# Patient Record
Sex: FEMALE | Race: BLACK OR AFRICAN AMERICAN | NOT HISPANIC OR LATINO | Employment: OTHER | URBAN - METROPOLITAN AREA
[De-identification: names, ages, dates, MRNs, and addresses within clinical notes are randomized per-mention and may not be internally consistent; named-entity substitution may affect disease eponyms.]

---

## 2017-03-08 ENCOUNTER — HISTORICAL (OUTPATIENT)
Dept: INTERNAL MEDICINE | Facility: CLINIC | Age: 57
End: 2017-03-08

## 2017-06-14 ENCOUNTER — HISTORICAL (OUTPATIENT)
Dept: ADMINISTRATIVE | Facility: HOSPITAL | Age: 57
End: 2017-06-14

## 2017-06-28 ENCOUNTER — HISTORICAL (OUTPATIENT)
Dept: ADMINISTRATIVE | Facility: HOSPITAL | Age: 57
End: 2017-06-28

## 2017-06-28 LAB
EST. AVERAGE GLUCOSE BLD GHB EST-MCNC: 286 MG/DL
HBA1C MFR BLD: 11.6 % (ref 4.2–6.3)

## 2017-10-21 ENCOUNTER — HISTORICAL (OUTPATIENT)
Dept: ADMINISTRATIVE | Facility: HOSPITAL | Age: 57
End: 2017-10-21

## 2017-10-21 LAB
APPEARANCE, UA: CLEAR
BACTERIA #/AREA URNS AUTO: ABNORMAL /[HPF]
BILIRUB UR QL STRIP: ABNORMAL MG/DL
COLOR UR: YELLOW
GLUCOSE (UA): >1000 MG/DL
HGB UR QL STRIP: ABNORMAL MG/DL
HYALINE CASTS #/AREA URNS LPF: ABNORMAL /[LPF]
KETONES UR QL STRIP: ABNORMAL MG/DL
LEUKOCYTE ESTERASE UR QL STRIP: 75 LEU/UL
NITRITE UR QL STRIP: ABNORMAL
PH UR STRIP: 7 [PH] (ref 4.5–8)
PROT UR QL STRIP: 20 MG/DL
RBC #/AREA URNS AUTO: ABNORMAL /[HPF]
SP GR UR STRIP: 1.01 (ref 1–1.03)
SQUAMOUS #/AREA URNS LPF: ABNORMAL /[LPF]
UROBILINOGEN UR STRIP-ACNC: NORMAL MG/DL
WBC #/AREA URNS AUTO: ABNORMAL /HPF

## 2018-03-26 ENCOUNTER — HISTORICAL (OUTPATIENT)
Dept: INTERNAL MEDICINE | Facility: CLINIC | Age: 58
End: 2018-03-26

## 2018-03-26 LAB
CHOLEST SERPL-MCNC: 230 MG/DL
CHOLEST/HDLC SERPL: 3.5 {RATIO} (ref 0–4.4)
CREAT UR-MCNC: 211 MG/DL
EST. AVERAGE GLUCOSE BLD GHB EST-MCNC: 183 MG/DL
HBA1C MFR BLD: 8 % (ref 4.2–6.3)
HDLC SERPL-MCNC: 65 MG/DL
LDLC SERPL CALC-MCNC: 138 MG/DL (ref 0–130)
MICROALBUMIN UR-MCNC: 149 MG/L (ref 0–19)
MICROALBUMIN/CREAT RATIO PNL UR: 70.6 MCG/MG CR (ref 0–29)
TRIGL SERPL-MCNC: 133 MG/DL
VLDLC SERPL CALC-MCNC: 27 MG/DL

## 2018-08-06 ENCOUNTER — HISTORICAL (OUTPATIENT)
Dept: INTERNAL MEDICINE | Facility: CLINIC | Age: 58
End: 2018-08-06

## 2018-08-06 LAB
CREAT UR-MCNC: 228 MG/DL
MICROALBUMIN UR-MCNC: 130 MG/L (ref 0–19)
MICROALBUMIN/CREAT RATIO PNL UR: 57 MCG/MG CR (ref 0–29)
T4 FREE SERPL-MCNC: 0.96 NG/DL (ref 0.76–1.46)
TSH SERPL-ACNC: 2.13 MIU/L (ref 0.36–3.74)

## 2018-10-25 ENCOUNTER — HISTORICAL (OUTPATIENT)
Dept: INTERNAL MEDICINE | Facility: CLINIC | Age: 58
End: 2018-10-25

## 2018-10-25 LAB
EST. AVERAGE GLUCOSE BLD GHB EST-MCNC: 177 MG/DL
HBA1C MFR BLD: 7.8 % (ref 4.2–6.3)

## 2019-02-04 ENCOUNTER — HISTORICAL (OUTPATIENT)
Dept: RADIOLOGY | Facility: HOSPITAL | Age: 59
End: 2019-02-04

## 2019-02-26 ENCOUNTER — HISTORICAL (OUTPATIENT)
Dept: RADIOLOGY | Facility: HOSPITAL | Age: 59
End: 2019-02-26

## 2019-03-01 ENCOUNTER — HISTORICAL (OUTPATIENT)
Dept: RADIOLOGY | Facility: HOSPITAL | Age: 59
End: 2019-03-01

## 2019-04-03 ENCOUNTER — HISTORICAL (OUTPATIENT)
Dept: SURGERY | Facility: HOSPITAL | Age: 59
End: 2019-04-03

## 2019-05-01 ENCOUNTER — HISTORICAL (OUTPATIENT)
Dept: ADMINISTRATIVE | Facility: HOSPITAL | Age: 59
End: 2019-05-01

## 2019-05-01 LAB
ABS NEUT (OLG): 4.24 X10(3)/MCL (ref 2.1–9.2)
ALBUMIN SERPL-MCNC: 3.9 GM/DL (ref 3.4–5)
ALBUMIN/GLOB SERPL: 0.8 RATIO (ref 1.1–2)
ALP SERPL-CCNC: 83 UNIT/L (ref 45–117)
ALT SERPL-CCNC: 19 UNIT/L (ref 12–78)
AST SERPL-CCNC: 13 UNIT/L (ref 15–37)
BASOPHILS # BLD AUTO: 0.02 X10(3)/MCL
BASOPHILS NFR BLD AUTO: 0 %
BILIRUB SERPL-MCNC: 0.5 MG/DL (ref 0.2–1)
BILIRUBIN DIRECT+TOT PNL SERPL-MCNC: <0.1 MG/DL
BILIRUBIN DIRECT+TOT PNL SERPL-MCNC: ABNORMAL MG/DL
BUN SERPL-MCNC: 13 MG/DL (ref 7–18)
CALCIUM SERPL-MCNC: 10 MG/DL (ref 8.5–10.1)
CHLORIDE SERPL-SCNC: 104 MMOL/L (ref 98–107)
CO2 SERPL-SCNC: 27 MMOL/L (ref 21–32)
CREAT SERPL-MCNC: 0.6 MG/DL (ref 0.6–1.3)
EOSINOPHIL # BLD AUTO: 0.1 X10(3)/MCL
EOSINOPHIL NFR BLD AUTO: 1 %
ERYTHROCYTE [DISTWIDTH] IN BLOOD BY AUTOMATED COUNT: 13.2 % (ref 11.5–14.5)
EST. AVERAGE GLUCOSE BLD GHB EST-MCNC: 186 MG/DL
GLOBULIN SER-MCNC: 4.7 GM/ML (ref 2.3–3.5)
GLUCOSE SERPL-MCNC: 104 MG/DL (ref 74–106)
HBA1C MFR BLD: 8.1 % (ref 4.2–6.3)
HCT VFR BLD AUTO: 36.6 % (ref 35–46)
HGB BLD-MCNC: 11.6 GM/DL (ref 12–16)
IMM GRANULOCYTES # BLD AUTO: 0.03 10*3/UL
IMM GRANULOCYTES NFR BLD AUTO: 0 %
LYMPHOCYTES # BLD AUTO: 2.74 X10(3)/MCL
LYMPHOCYTES NFR BLD AUTO: 35 % (ref 13–40)
MCH RBC QN AUTO: 27 PG (ref 26–34)
MCHC RBC AUTO-ENTMCNC: 31.7 GM/DL (ref 31–37)
MCV RBC AUTO: 85.3 FL (ref 80–100)
MONOCYTES # BLD AUTO: 0.8 X10(3)/MCL
MONOCYTES NFR BLD AUTO: 10 % (ref 4–12)
NEUTROPHILS # BLD AUTO: 4.24 X10(3)/MCL
NEUTROPHILS NFR BLD AUTO: 53 X10(3)/MCL
PLATELET # BLD AUTO: 313 X10(3)/MCL (ref 130–400)
PMV BLD AUTO: 10.1 FL (ref 7.4–10.4)
POTASSIUM SERPL-SCNC: 3.7 MMOL/L (ref 3.5–5.1)
PROT SERPL-MCNC: 8.6 GM/DL (ref 6.4–8.2)
RBC # BLD AUTO: 4.29 X10(6)/MCL (ref 4–5.2)
SODIUM SERPL-SCNC: 138 MMOL/L (ref 136–145)
T4 FREE SERPL-MCNC: 0.96 NG/DL (ref 0.76–1.46)
TSH SERPL-ACNC: 2.13 MIU/L (ref 0.36–3.74)
WBC # SPEC AUTO: 7.9 X10(3)/MCL (ref 4.5–11)

## 2019-05-06 ENCOUNTER — HISTORICAL (OUTPATIENT)
Dept: INTERNAL MEDICINE | Facility: CLINIC | Age: 59
End: 2019-05-06

## 2019-05-06 LAB
CHOLEST SERPL-MCNC: 207 MG/DL
CHOLEST/HDLC SERPL: 3.1 {RATIO} (ref 0–4.4)
HDLC SERPL-MCNC: 66 MG/DL
LDLC SERPL CALC-MCNC: 120 MG/DL (ref 0–130)
TRIGL SERPL-MCNC: 106 MG/DL
VLDLC SERPL CALC-MCNC: 21 MG/DL

## 2019-05-08 ENCOUNTER — HISTORICAL (OUTPATIENT)
Dept: SURGERY | Facility: HOSPITAL | Age: 59
End: 2019-05-08

## 2019-05-08 LAB — POTASSIUM SERPL-SCNC: 4.1 MMOL/L (ref 3.5–5.1)

## 2019-05-13 ENCOUNTER — HISTORICAL (OUTPATIENT)
Dept: RADIOLOGY | Facility: HOSPITAL | Age: 59
End: 2019-05-13

## 2019-12-04 ENCOUNTER — HISTORICAL (OUTPATIENT)
Dept: ADMINISTRATIVE | Facility: HOSPITAL | Age: 59
End: 2019-12-04

## 2019-12-04 LAB
APPEARANCE, UA: CLEAR
BACTERIA #/AREA URNS AUTO: ABNORMAL /HPF
BILIRUB UR QL STRIP: NEGATIVE
COLOR UR: ABNORMAL
GLUCOSE (UA): NEGATIVE
HGB UR QL STRIP: 0.1
HYALINE CASTS #/AREA URNS LPF: ABNORMAL /LPF
KETONES UR QL STRIP: NEGATIVE
LEUKOCYTE ESTERASE UR QL STRIP: 500 LEU/UL
NITRITE UR QL STRIP: NEGATIVE
PH UR STRIP: 5.5 [PH] (ref 4.5–8)
PROT UR QL STRIP: 10 MG/DL
RBC #/AREA URNS AUTO: ABNORMAL /HPF
SP GR UR STRIP: 1.01 (ref 1–1.03)
SQUAMOUS #/AREA URNS LPF: ABNORMAL /LPF
UROBILINOGEN UR STRIP-ACNC: NORMAL
WBC #/AREA URNS AUTO: ABNORMAL /HPF

## 2020-02-05 ENCOUNTER — HISTORICAL (OUTPATIENT)
Dept: RADIOLOGY | Facility: HOSPITAL | Age: 60
End: 2020-02-05

## 2020-02-11 LAB
BILIRUB SERPL-MCNC: NEGATIVE MG/DL
BLOOD URINE, POC: NORMAL
CLARITY, POC UA: CLEAR
COLOR, POC UA: YELLOW
GLUCOSE UR QL STRIP: NEGATIVE
KETONES UR QL STRIP: NORMAL
LEUKOCYTE EST, POC UA: NORMAL
NITRITE, POC UA: NEGATIVE
PH, POC UA: 5.5
PROTEIN, POC: NORMAL
SPECIFIC GRAVITY, POC UA: 1.01
UROBILINOGEN, POC UA: NORMAL

## 2020-04-27 ENCOUNTER — HISTORICAL (OUTPATIENT)
Dept: ADMINISTRATIVE | Facility: HOSPITAL | Age: 60
End: 2020-04-27

## 2020-04-27 LAB
APPEARANCE, UA: CLEAR
BACTERIA #/AREA URNS AUTO: ABNORMAL /HPF
BILIRUB SERPL-MCNC: NEGATIVE MG/DL
BILIRUB UR QL STRIP: NEGATIVE
BLOOD URINE, POC: NEGATIVE
CLARITY, POC UA: CLEAR
COLOR UR: YELLOW
COLOR, POC UA: YELLOW
GLUCOSE (UA): 150 MG/DL
GLUCOSE UR QL STRIP: NORMAL
HGB UR QL STRIP: NEGATIVE
HYALINE CASTS #/AREA URNS LPF: ABNORMAL /LPF
KETONES UR QL STRIP: ABNORMAL
KETONES UR QL STRIP: NEGATIVE
LEUKOCYTE EST, POC UA: NEGATIVE
LEUKOCYTE ESTERASE UR QL STRIP: NEGATIVE
NITRITE UR QL STRIP: NEGATIVE
NITRITE, POC UA: NEGATIVE
PH UR STRIP: 5.5 [PH] (ref 4.5–8)
PH, POC UA: 5
PROT UR QL STRIP: 30 MG/DL
PROTEIN, POC: NORMAL
RBC #/AREA URNS AUTO: ABNORMAL /HPF
SP GR UR STRIP: 1.02 (ref 1–1.03)
SPECIFIC GRAVITY, POC UA: 1.02
SQUAMOUS #/AREA URNS LPF: ABNORMAL /LPF
UROBILINOGEN UR STRIP-ACNC: NORMAL
UROBILINOGEN, POC UA: NORMAL
WBC #/AREA URNS AUTO: ABNORMAL /HPF

## 2020-04-29 LAB — FINAL CULTURE: NORMAL

## 2020-06-12 ENCOUNTER — HISTORICAL (OUTPATIENT)
Dept: INTERNAL MEDICINE | Facility: CLINIC | Age: 60
End: 2020-06-12

## 2020-06-12 LAB
ABS NEUT (OLG): 3.2 X10(3)/MCL (ref 2.1–9.2)
ALBUMIN SERPL-MCNC: 4 GM/DL (ref 3.4–5)
ALBUMIN/GLOB SERPL: 0.9 RATIO (ref 1.1–2)
ALP SERPL-CCNC: 72 UNIT/L (ref 45–117)
ALT SERPL-CCNC: 17 UNIT/L (ref 12–78)
AST SERPL-CCNC: 9 UNIT/L (ref 15–37)
BASOPHILS # BLD AUTO: 0 X10(3)/MCL (ref 0–0.2)
BASOPHILS NFR BLD AUTO: 0 %
BILIRUB SERPL-MCNC: 0.3 MG/DL (ref 0.2–1)
BILIRUBIN DIRECT+TOT PNL SERPL-MCNC: 0.1 MG/DL (ref 0–0.2)
BILIRUBIN DIRECT+TOT PNL SERPL-MCNC: 0.2 MG/DL
BUN SERPL-MCNC: 20 MG/DL (ref 7–18)
CALCIUM SERPL-MCNC: 9.8 MG/DL (ref 8.5–10.1)
CHLORIDE SERPL-SCNC: 102 MMOL/L (ref 98–107)
CHOLEST SERPL-MCNC: 207 MG/DL
CHOLEST/HDLC SERPL: 3.2 {RATIO} (ref 0–4.4)
CO2 SERPL-SCNC: 27 MMOL/L (ref 21–32)
CREAT SERPL-MCNC: 0.7 MG/DL (ref 0.6–1.3)
DEPRECATED CALCIDIOL+CALCIFEROL SERPL-MC: 9.6 NG/ML (ref 30–80)
EOSINOPHIL # BLD AUTO: 0.1 X10(3)/MCL (ref 0–0.9)
EOSINOPHIL NFR BLD AUTO: 1 %
ERYTHROCYTE [DISTWIDTH] IN BLOOD BY AUTOMATED COUNT: 13.1 % (ref 11.5–14.5)
EST. AVERAGE GLUCOSE BLD GHB EST-MCNC: 206 MG/DL
GLOBULIN SER-MCNC: 4.7 GM/ML (ref 2.3–3.5)
GLUCOSE SERPL-MCNC: 231 MG/DL (ref 74–106)
HBA1C MFR BLD: 8.8 % (ref 4.2–6.3)
HCT VFR BLD AUTO: 37 % (ref 35–46)
HDLC SERPL-MCNC: 64 MG/DL (ref 40–59)
HGB BLD-MCNC: 11.8 GM/DL (ref 12–16)
IMM GRANULOCYTES # BLD AUTO: 0.02 10*3/UL
IMM GRANULOCYTES NFR BLD AUTO: 0 %
LDLC SERPL CALC-MCNC: 112 MG/DL
LYMPHOCYTES # BLD AUTO: 2.8 X10(3)/MCL (ref 0.6–4.6)
LYMPHOCYTES NFR BLD AUTO: 40 %
MCH RBC QN AUTO: 26.9 PG (ref 26–34)
MCHC RBC AUTO-ENTMCNC: 31.9 GM/DL (ref 31–37)
MCV RBC AUTO: 84.5 FL (ref 80–100)
MONOCYTES # BLD AUTO: 0.8 X10(3)/MCL (ref 0.1–1.3)
MONOCYTES NFR BLD AUTO: 11 %
NEUTROPHILS # BLD AUTO: 3.2 X10(3)/MCL (ref 2.1–9.2)
NEUTROPHILS NFR BLD AUTO: 46 %
PLATELET # BLD AUTO: 329 X10(3)/MCL (ref 130–400)
PMV BLD AUTO: 9.8 FL (ref 7.4–10.4)
POTASSIUM SERPL-SCNC: 4 MMOL/L (ref 3.5–5.1)
PROT SERPL-MCNC: 8.7 GM/DL (ref 6.4–8.2)
RBC # BLD AUTO: 4.38 X10(6)/MCL (ref 4–5.2)
SODIUM SERPL-SCNC: 137 MMOL/L (ref 136–145)
TRIGL SERPL-MCNC: 155 MG/DL
TSH SERPL-ACNC: 2.37 MIU/L (ref 0.36–3.74)
VIT B12 SERPL-MCNC: 582 PG/ML (ref 193–986)
VLDLC SERPL CALC-MCNC: 31 MG/DL
WBC # SPEC AUTO: 6.9 X10(3)/MCL (ref 4.5–11)

## 2020-08-31 ENCOUNTER — HISTORICAL (OUTPATIENT)
Dept: LAB | Facility: HOSPITAL | Age: 60
End: 2020-08-31

## 2020-08-31 LAB — DEPRECATED CALCIDIOL+CALCIFEROL SERPL-MC: 22.5 NG/ML (ref 30–80)

## 2020-11-25 ENCOUNTER — HISTORICAL (OUTPATIENT)
Dept: ADMINISTRATIVE | Facility: HOSPITAL | Age: 60
End: 2020-11-25

## 2020-12-01 ENCOUNTER — HISTORICAL (OUTPATIENT)
Dept: SURGERY | Facility: HOSPITAL | Age: 60
End: 2020-12-01

## 2020-12-24 ENCOUNTER — HISTORICAL (OUTPATIENT)
Dept: INTERNAL MEDICINE | Facility: CLINIC | Age: 60
End: 2020-12-24

## 2020-12-24 LAB
ABS NEUT (OLG): 2.98 X10(3)/MCL (ref 2.1–9.2)
ALBUMIN SERPL-MCNC: 4 GM/DL (ref 3.4–4.8)
ALBUMIN/GLOB SERPL: 1 RATIO (ref 1.1–2)
ALP SERPL-CCNC: 66 UNIT/L (ref 40–150)
ALT SERPL-CCNC: 10 UNIT/L (ref 0–55)
AST SERPL-CCNC: 11 UNIT/L (ref 5–34)
BASOPHILS # BLD AUTO: 0 X10(3)/MCL (ref 0–0.2)
BASOPHILS NFR BLD AUTO: 1 %
BILIRUB SERPL-MCNC: 0.3 MG/DL
BILIRUBIN DIRECT+TOT PNL SERPL-MCNC: 0.1 MG/DL (ref 0–0.8)
BILIRUBIN DIRECT+TOT PNL SERPL-MCNC: 0.2 MG/DL (ref 0–0.5)
BUN SERPL-MCNC: 18 MG/DL (ref 9.8–20.1)
CALCIUM SERPL-MCNC: 10.1 MG/DL (ref 8.4–10.2)
CHLORIDE SERPL-SCNC: 103 MMOL/L (ref 98–107)
CHOLEST SERPL-MCNC: 195 MG/DL
CHOLEST/HDLC SERPL: 3 {RATIO} (ref 0–5)
CO2 SERPL-SCNC: 26 MMOL/L (ref 23–31)
CREAT SERPL-MCNC: 0.67 MG/DL (ref 0.55–1.02)
CREAT UR-MCNC: 109.4 MG/DL (ref 45–106)
CREAT UR-MCNC: 109.4 MG/DL (ref 45–106)
DEPRECATED CALCIDIOL+CALCIFEROL SERPL-MC: 22.1 NG/ML (ref 30–80)
EOSINOPHIL # BLD AUTO: 0.1 X10(3)/MCL (ref 0–0.9)
EOSINOPHIL NFR BLD AUTO: 2 %
ERYTHROCYTE [DISTWIDTH] IN BLOOD BY AUTOMATED COUNT: 12.9 % (ref 11.5–14.5)
EST. AVERAGE GLUCOSE BLD GHB EST-MCNC: 182.9 MG/DL
GLOBULIN SER-MCNC: 4 GM/DL (ref 2.4–3.5)
GLUCOSE SERPL-MCNC: 112 MG/DL (ref 82–115)
HBA1C MFR BLD: 8 %
HCT VFR BLD AUTO: 33.5 % (ref 35–46)
HDLC SERPL-MCNC: 60 MG/DL (ref 35–60)
HGB BLD-MCNC: 10.6 GM/DL (ref 12–16)
IMM GRANULOCYTES # BLD AUTO: 0.02 10*3/UL
IMM GRANULOCYTES NFR BLD AUTO: 0 %
LDLC SERPL CALC-MCNC: 118 MG/DL (ref 50–140)
LYMPHOCYTES # BLD AUTO: 2.2 X10(3)/MCL (ref 0.6–4.6)
LYMPHOCYTES NFR BLD AUTO: 35 %
MCH RBC QN AUTO: 27 PG (ref 26–34)
MCHC RBC AUTO-ENTMCNC: 31.6 GM/DL (ref 31–37)
MCV RBC AUTO: 85.2 FL (ref 80–100)
MICROALBUMIN UR-MCNC: 148.1 UG/ML
MICROALBUMIN/CREAT RATIO PNL UR: 135.4 MG/GM CR (ref 0–30)
MONOCYTES # BLD AUTO: 0.9 X10(3)/MCL (ref 0.1–1.3)
MONOCYTES NFR BLD AUTO: 14 %
NEUTROPHILS # BLD AUTO: 2.98 X10(3)/MCL (ref 2.1–9.2)
NEUTROPHILS NFR BLD AUTO: 47 %
PLATELET # BLD AUTO: 295 X10(3)/MCL (ref 130–400)
PMV BLD AUTO: 9.8 FL (ref 7.4–10.4)
POTASSIUM SERPL-SCNC: 3.8 MMOL/L (ref 3.5–5.1)
PROT SERPL-MCNC: 8 GM/DL (ref 5.8–7.6)
PROT UR STRIP-MCNC: 35.8 MG/DL
PROT/CREAT UR-RTO: 327.2 MG/GM
RBC # BLD AUTO: 3.93 X10(6)/MCL (ref 4–5.2)
SODIUM SERPL-SCNC: 140 MMOL/L (ref 136–145)
T4 SERPL-MCNC: 7.13 UG/DL (ref 4.87–11.72)
TRIGL SERPL-MCNC: 86 MG/DL (ref 37–140)
TSH SERPL-ACNC: 3.22 UIU/ML (ref 0.35–4.94)
VIT B12 SERPL-MCNC: 672 PG/ML (ref 213–816)
VLDLC SERPL CALC-MCNC: 17 MG/DL
WBC # SPEC AUTO: 6.3 X10(3)/MCL (ref 4.5–11)

## 2021-03-05 LAB
BILIRUB SERPL-MCNC: NEGATIVE MG/DL
BLOOD URINE, POC: NEGATIVE
GLUCOSE UR QL STRIP: NORMAL
KETONES UR QL STRIP: NEGATIVE
LEUKOCYTE EST, POC UA: NORMAL
NITRITE, POC UA: NEGATIVE
PH, POC UA: 7
PROTEIN, POC: NEGATIVE
SPECIFIC GRAVITY, POC UA: 1.02
UROBILINOGEN, POC UA: NORMAL

## 2021-04-12 ENCOUNTER — HISTORICAL (OUTPATIENT)
Dept: ADMINISTRATIVE | Facility: HOSPITAL | Age: 61
End: 2021-04-12

## 2021-04-12 LAB
DEPRECATED CALCIDIOL+CALCIFEROL SERPL-MC: 34.5 NG/ML (ref 30–80)
EST. AVERAGE GLUCOSE BLD GHB EST-MCNC: 180 MG/DL
HBA1C MFR BLD: 7.9 %
T3FREE SERPL-MCNC: 3.54 PG/ML (ref 1.71–3.71)
T4 FREE SERPL-MCNC: 0.79 NG/DL (ref 0.7–1.48)
TSH SERPL-ACNC: 1.9 UIU/ML (ref 0.35–4.94)

## 2021-10-05 ENCOUNTER — HISTORICAL (OUTPATIENT)
Dept: ADMINISTRATIVE | Facility: HOSPITAL | Age: 61
End: 2021-10-05

## 2021-10-05 LAB
ABS NEUT (OLG): 6.24 X10(3)/MCL (ref 2.1–9.2)
ALBUMIN SERPL-MCNC: 4.3 GM/DL (ref 3.4–4.8)
ALBUMIN/GLOB SERPL: 1 RATIO (ref 1.1–2)
ALP SERPL-CCNC: 84 UNIT/L (ref 40–150)
ALT SERPL-CCNC: 15 UNIT/L (ref 0–55)
APPEARANCE, UA: CLEAR
AST SERPL-CCNC: 15 UNIT/L (ref 5–34)
BACTERIA #/AREA URNS AUTO: ABNORMAL /HPF
BASOPHILS # BLD AUTO: 0 X10(3)/MCL (ref 0–0.2)
BASOPHILS NFR BLD AUTO: 0 %
BILIRUB SERPL-MCNC: 0.4 MG/DL
BILIRUB UR QL STRIP: NEGATIVE
BILIRUBIN DIRECT+TOT PNL SERPL-MCNC: 0.2 MG/DL (ref 0–0.5)
BILIRUBIN DIRECT+TOT PNL SERPL-MCNC: 0.2 MG/DL (ref 0–0.8)
BUN SERPL-MCNC: 16.7 MG/DL (ref 9.8–20.1)
CALCIUM SERPL-MCNC: 11.7 MG/DL (ref 8.4–10.2)
CHLORIDE SERPL-SCNC: 101 MMOL/L (ref 98–107)
CO2 SERPL-SCNC: 26 MMOL/L (ref 23–31)
COLOR UR: YELLOW
CREAT SERPL-MCNC: 0.83 MG/DL (ref 0.55–1.02)
DEPRECATED CALCIDIOL+CALCIFEROL SERPL-MC: 38.8 NG/ML (ref 30–80)
EOSINOPHIL # BLD AUTO: 0.1 X10(3)/MCL (ref 0–0.9)
EOSINOPHIL NFR BLD AUTO: 1 %
ERYTHROCYTE [DISTWIDTH] IN BLOOD BY AUTOMATED COUNT: 13.2 % (ref 11.5–14.5)
EST. AVERAGE GLUCOSE BLD GHB EST-MCNC: 200.1 MG/DL
FERRITIN SERPL-MCNC: 74.86 NG/ML (ref 4.63–204)
GLOBULIN SER-MCNC: 4.4 GM/DL (ref 2.4–3.5)
GLUCOSE (UA): 30 MG/DL
GLUCOSE SERPL-MCNC: 147 MG/DL (ref 82–115)
HBA1C MFR BLD: 8.6 %
HCT VFR BLD AUTO: 37.2 % (ref 35–46)
HGB BLD-MCNC: 11.9 GM/DL (ref 12–16)
HGB UR QL STRIP: NEGATIVE
HYALINE CASTS #/AREA URNS LPF: ABNORMAL /LPF
IMM GRANULOCYTES # BLD AUTO: 0.03 10*3/UL
IMM GRANULOCYTES NFR BLD AUTO: 0 %
IRON SATN MFR SERPL: 13 % (ref 20–50)
IRON SERPL-MCNC: 42 UG/DL (ref 50–170)
KETONES UR QL STRIP: ABNORMAL
LEUKOCYTE ESTERASE UR QL STRIP: 250 LEU/UL
LYMPHOCYTES # BLD AUTO: 3.7 X10(3)/MCL (ref 0.6–4.6)
LYMPHOCYTES NFR BLD AUTO: 34 %
MCH RBC QN AUTO: 27.4 PG (ref 26–34)
MCHC RBC AUTO-ENTMCNC: 32 GM/DL (ref 31–37)
MCV RBC AUTO: 85.5 FL (ref 80–100)
MONOCYTES # BLD AUTO: 0.8 X10(3)/MCL (ref 0.1–1.3)
MONOCYTES NFR BLD AUTO: 8 %
NEUTROPHILS # BLD AUTO: 6.24 X10(3)/MCL (ref 2.1–9.2)
NEUTROPHILS NFR BLD AUTO: 57 %
NITRITE UR QL STRIP: NEGATIVE
NRBC BLD AUTO-RTO: 0 % (ref 0–0.2)
PH UR STRIP: 5.5 [PH] (ref 4.5–8)
PLATELET # BLD AUTO: 322 X10(3)/MCL (ref 130–400)
PMV BLD AUTO: 9.6 FL (ref 7.4–10.4)
POTASSIUM SERPL-SCNC: 4.2 MMOL/L (ref 3.5–5.1)
PROT SERPL-MCNC: 8.7 GM/DL (ref 5.8–7.6)
PROT UR QL STRIP: 50 MG/DL
RBC # BLD AUTO: 4.35 X10(6)/MCL (ref 4–5.2)
RBC #/AREA URNS AUTO: ABNORMAL /HPF
SODIUM SERPL-SCNC: 139 MMOL/L (ref 136–145)
SP GR UR STRIP: 1.02 (ref 1–1.03)
SQUAMOUS #/AREA URNS LPF: ABNORMAL /LPF
TIBC SERPL-MCNC: 276 UG/DL (ref 70–310)
TIBC SERPL-MCNC: 318 UG/DL (ref 250–450)
TRANSFERRIN SERPL-MCNC: 285 MG/DL (ref 173–360)
TSH SERPL-ACNC: 1.25 UIU/ML (ref 0.35–4.94)
UROBILINOGEN UR STRIP-ACNC: 2 MG/DL
WBC # SPEC AUTO: 10.9 X10(3)/MCL (ref 4.5–11)
WBC #/AREA URNS AUTO: ABNORMAL /HPF

## 2021-10-07 LAB — FINAL CULTURE: NORMAL

## 2022-01-27 ENCOUNTER — HISTORICAL (OUTPATIENT)
Dept: LAB | Facility: HOSPITAL | Age: 62
End: 2022-01-27

## 2022-01-27 LAB
ABS NEUT (OLG): 3.84 (ref 2.1–9.2)
ALBUMIN SERPL-MCNC: 4.2 G/DL (ref 3.4–4.8)
ALBUMIN/GLOB SERPL: 1 {RATIO} (ref 1.1–2)
ALP SERPL-CCNC: 63 U/L (ref 40–150)
ALT SERPL-CCNC: 13 U/L (ref 0–55)
AST SERPL-CCNC: 13 U/L (ref 5–34)
BASOPHILS # BLD AUTO: 0 10*3/UL (ref 0–0.2)
BASOPHILS NFR BLD AUTO: 0 %
BILIRUB SERPL-MCNC: 0.3 MG/DL
BILIRUBIN DIRECT+TOT PNL SERPL-MCNC: 0.1 (ref 0–0.5)
BILIRUBIN DIRECT+TOT PNL SERPL-MCNC: 0.2 (ref 0–0.8)
BUN SERPL-MCNC: 18.8 MG/DL (ref 9.8–20.1)
CALCIUM SERPL-MCNC: 10.8 MG/DL (ref 8.7–10.5)
CHLORIDE SERPL-SCNC: 100 MMOL/L (ref 98–107)
CO2 SERPL-SCNC: 27 MMOL/L (ref 23–31)
CREAT SERPL-MCNC: 0.91 MG/DL (ref 0.55–1.02)
CREAT UR-MCNC: 162.1 MG/DL (ref 45–106)
CREAT UR-MCNC: 162.1 MG/DL (ref 45–106)
DEPRECATED CALCIDIOL+CALCIFEROL SERPL-MC: 39 NG/ML (ref 30–80)
EOSINOPHIL # BLD AUTO: 0.1 10*3/UL (ref 0–0.9)
EOSINOPHIL NFR BLD AUTO: 1 %
ERYTHROCYTE [DISTWIDTH] IN BLOOD BY AUTOMATED COUNT: 13.2 % (ref 11.5–14.5)
EST. AVERAGE GLUCOSE BLD GHB EST-MCNC: 197.2 MG/DL
FERRITIN SERPL-MCNC: 69.78 NG/ML (ref 4.63–204)
FLAG2 (OHS): 60
FLAG3 (OHS): 80
FLAGS (OHS): 80
GLOBULIN SER-MCNC: 4.1 G/DL (ref 2.4–3.5)
GLUCOSE SERPL-MCNC: 231 MG/DL (ref 82–115)
HBA1C MFR BLD: 8.5 %
HCT VFR BLD AUTO: 35.1 % (ref 35–46)
HEMOLYSIS INTERF INDEX SERPL-ACNC: 1
HGB BLD-MCNC: 11.2 G/DL (ref 12–16)
ICTERIC INTERF INDEX SERPL-ACNC: 0
IMM GRANULOCYTES # BLD AUTO: 0.02 10*3/UL
IMM GRANULOCYTES NFR BLD AUTO: 0 %
IRON SATN MFR SERPL: 17 % (ref 20–50)
IRON SERPL-MCNC: 49 UG/DL (ref 50–170)
LIPEMIC INTERF INDEX SERPL-ACNC: 5
LOW EVENT # SUSPECT FLAG (OHS): 80
LYMPHOCYTES # BLD AUTO: 2.2 10*3/UL (ref 0.6–4.6)
LYMPHOCYTES NFR BLD AUTO: 32 %
MANUAL DIFF? (OHS): NO
MCH RBC QN AUTO: 27.1 PG (ref 26–34)
MCHC RBC AUTO-ENTMCNC: 31.9 G/DL (ref 31–37)
MCV RBC AUTO: 84.8 FL (ref 80–100)
MICROALBUMIN UR-MCNC: 68.2
MICROALBUMIN/CREAT RATIO PNL UR: 42.1 (ref 0–30)
MO BLASTS SUSPECT FLAG (OHS): 40
MONOCYTES # BLD AUTO: 0.6 10*3/UL (ref 0.1–1.3)
MONOCYTES NFR BLD AUTO: 9 %
NEUTROPHILS # BLD AUTO: 3.84 10*3/UL (ref 2.1–9.2)
NEUTROPHILS NFR BLD AUTO: 57 %
NRBC BLD AUTO-RTO: 0 % (ref 0–0.2)
PLATELET # BLD AUTO: 326 10*3/UL (ref 130–400)
PMV BLD AUTO: 9.8 FL (ref 7.4–10.4)
POTASSIUM SERPL-SCNC: 3.8 MMOL/L (ref 3.5–5.1)
PROT SERPL-MCNC: 8.3 G/DL (ref 5.8–7.6)
PROT UR STRIP-MCNC: 20.9 MG/DL
PROT/CREAT UR-RTO: 128.9
PTH-INTACT SERPL-MCNC: 83.8 PG/ML (ref 8.7–77)
RBC # BLD AUTO: 4.14 10*6/UL (ref 4–5.2)
SODIUM SERPL-SCNC: 137 MMOL/L (ref 136–145)
TIBC SERPL-MCNC: 235 UG/DL (ref 70–310)
TIBC SERPL-MCNC: 284 UG/DL (ref 250–450)
TRANSFERRIN SERPL-MCNC: 269 MG/DL (ref 173–360)
TSH SERPL-ACNC: 1.13 M[IU]/L (ref 0.35–4.94)
WBC # SPEC AUTO: 6.7 10*3/UL (ref 4.5–11)

## 2022-02-10 ENCOUNTER — HISTORICAL (OUTPATIENT)
Dept: ADMINISTRATIVE | Facility: HOSPITAL | Age: 62
End: 2022-02-10

## 2022-02-10 ENCOUNTER — HISTORICAL (OUTPATIENT)
Dept: RADIOLOGY | Facility: HOSPITAL | Age: 62
End: 2022-02-10

## 2022-04-10 ENCOUNTER — HISTORICAL (OUTPATIENT)
Dept: ADMINISTRATIVE | Facility: HOSPITAL | Age: 62
End: 2022-04-10

## 2022-04-25 ENCOUNTER — HISTORICAL (OUTPATIENT)
Dept: RADIOLOGY | Facility: HOSPITAL | Age: 62
End: 2022-04-25

## 2022-04-27 ENCOUNTER — HISTORICAL (OUTPATIENT)
Dept: RADIOLOGY | Facility: HOSPITAL | Age: 62
End: 2022-04-27

## 2022-04-28 VITALS
BODY MASS INDEX: 25.68 KG/M2 | SYSTOLIC BLOOD PRESSURE: 166 MMHG | HEIGHT: 65 IN | OXYGEN SATURATION: 98 % | DIASTOLIC BLOOD PRESSURE: 71 MMHG | WEIGHT: 154.13 LBS

## 2022-05-03 NOTE — HISTORICAL [facility] CERNER
This is a historical note converted from Mabel. Formatting and pictures may have been removed.  Please reference Mabel for original formatting and attached multimedia. Chief Complaint  follow up with medication refills, increase urination  History of Present Illness  58-year-old -American female with past medical history significant for diabetes mellitus type II, hyperthyroidism, hypertension, hyperlipidemia, peripheral neuropathy presents to medicine clinic today for routine follow-up.?  ?   Last visit- Patient is self pay so cannot do vaccinations, will be applying to indigent today. She wants to do all health maintenance test after she has gotten insurance. Only wants some labs done today since its too expensive. States that her sugars run from 90 - 140 in the morning. taking 35 units Levemir at bedtime. ?She stopped taking Januvia on her own. Ran out of BP meds, wants refills. Did nt take any meds this AM. Not complaint with diet. Only take PTU BID not TID as prescribed, will change Rx. Denies?chest pain, shortness of breath, palpitations, PND, orthopnea, fever, chills, nausea, vomiting, diarrhea, constipation, weight loss.  ?   TODAY - here for f/u. need refills. c/o increased urination with some itching. but no burning, pain, or foul odor. Not sexually active. No fevers, chills. She left her FIT at home, will bring it later. She refused all vaccination.?She noticed her CBG?to be higher on Levemir?30 units qPM, increased it back to 34 units qPM. CBG in 110-120 now. Reports com  ?  Review of Systems  review of systems negative except as stated above.  Physical Exam  Vitals & Measurements  T:?36.9? ?C (Oral)? HR:?82(Peripheral)? RR:?18? BP:?130/79?  HT:?165.00?cm? WT:?66.900?kg? BMI:?24.57?  General: Alert. Responsive. Not in?acute distress.  HEENT: Normocephalic, Atraumatic, No scleral icterus.  Neck: No JVD or carotid bruits.  Pulm: CTAB. No wheezes or crackles. symmetrical chest  expansion.  Cardio:?RRR. no appreciable murmurs/gallops.  Abdomen: BS+, soft, non-distended, non-tender  Extremity:? no LE edema. good equal pulses felt bilaterally in all extremities.  Neurologic: alert and oriented x 3. Responds to questions/commands appropriately.  MSK: No obvious deformities. Moving all extremities purposefully.  Skin: Warm, dry and without rashes.  ?  Diabetic foot exam: intact pulses bilaterally, no calluses, no ulcers, intact sensations bilaterally, intact DTRs. Normal monofilament test.?  Assessment/Plan  Urinary frequency, possibly cystitis  Had similar episode few years back, was treated for URI  Will send Macrobid for 5 days  Will get UA and UCx  ?   DM-II- uncontrolled, non-compliant with diet  - A1C: 11 (in 2016)-->7.8 (10/2018)-->7.7 (12/2019)--> 8.8 (06/2020)--> 8.0(12/2020)  -?Now Levemir 34 units at bedtime but taking 35 units, metformin 1000mg BID  - d/c glimepiride 2mg in 2018. was started on Januvia last time but she stopped taking on her own because FBG sometimes in 90s.  - Will repeat levels, decreasing bedtime insulin to 30 units. Cont metformin. Holding Januvia, will resta if A1c not controlled  -diabetic foot exam in clinic today, see above  -diabetic eye exam -follows optho  -asked to measure and keep a log of her blood sugar levels before breakfast and 2 hrs after largest meal  -advised on following diabetic diet and daily exercise  ?   Severe vitamin D deficiency - improving  vit D 9.6--> 22.1  Completed?vitD 50,000 units for 8 weeks  Continue Vit D3 2000 units daily  ?   Hyperproteinemia  Hyperglobulinemia  Has been fluctuating  Will get MxM lab work up- after insurance is avail per pt  ?   Peripheral neuropathy  -sx better controlled now  -Does not want more meds  ?   Essential Hypertension  -well controlled  -continue amlodipine 10mg daily and HCTZ-lisinopril 25-20 mg daily  -advised to keep a BP log, low salt diet, will give script for BP measuring device again  today  ?   Hyperlipidemia  Hypertriglyceridemia  -Cont Atorvastatin 40mg daily  -Advised on low fat/cholesterol diet  ?  History of hyperthyroidism  -thyroid uptake scan normal, thyrotropin receptor Ab level WNL, unknown etiology  -denies any symptoms of thyrotoxicosis  -patient taking PTU twice a day instead of three times a day  -Will change rx to  BID since last visit her TFTs were wnl with that dosing  -Will repeat levels today  ?  Fibroadenoma of left breast  -biopsy on 5/8 showed-left breast fibroadenoma  -repeat mammo on 2/2020 benign  -Mammo due, pt wants to wait till insurance. expalined risks, verbalized understanding  ?  Health maintenance  ASCVD risk- 10.4 %?.?Will start?Aspirin 81 mg daily. ?  HM labs (CBC, CMP, FLP, A1c, TSH, vitD):? DUE  HIV/Hep panel/syphilis: DUE  Pneumococcal vaccine (due at 65 unless special situations): at 65  Tdap: refused  Zoster vaccine: refused  Flu: refused  FIT/colonoscopy (after 50): 8/2018 neg- sent last visit but pt did not drop off sample to lab  Lung cancer screening (LDCT age 50-80):?NA  AAA screening (men, age 65-75): NA  Mammogram (after 40): last 2/2020- DUE NOW  DEXA scan: DUE  GYN (pap smears): had hysterectomy in past due to menorrhagia-follows GYN  ?  RTC?in??4? months  Labs - all labs, UA  Imaging - mammo, dexa  Referrals -? none  ?  Attending Physician Addendum  Management and plan discussed with resident?at time of clinic visit. Care was reasonable and necessary. Routine follow up.  Referrals  Clinic Follow up, *Est. 02/05/22 3:00:00 CST, Order for future visit, Diabetes mellitus type 2  Hyperthyroidism  HTN - Hypertension  Vitamin D deficiency  Hyperlipidemia, [facility]   Problem List/Past Medical History  Ongoing  Diabetes  Diabetes mellitus type 2  Diabetic neuropathy  Fibroadenoma  HTN - Hypertension  Hyperlipidemia  Hyperthyroidism  Post-operative state  Vitamin D deficiency  Historical  No qualifying data  Procedure/Surgical  History  Cataract Extraction Phacoemulsification (Left) (12/01/2020)  Extracapsular cataract removal with insertion of intraocular lens prosthesis (1 stage procedure), manual or mechanical technique (eg, irrigation and aspiration or phacoemulsification); without endoscopic cyclophotocoagulation (12/01/2020)  IOL Placement (Left) (12/01/2020)  Replacement of Left Lens with Synthetic Substitute, Percutaneous Approach (12/01/2020)  Biopsy, breast, with placement of breast localization device(s) (eg, clip, metallic pellet), when performed, and imaging of the biopsy specimen, when performed, percutaneous; first lesion, including stereotactic guidance (05/13/2019)  Excision of Left Breast, Percutaneous Approach, Diagnostic (05/13/2019)  Placement of breast localization device(s) (eg, clip, metallic pellet, wire/needle, radioactive seeds), percutaneous; first lesion, including mammographic guidance (05/08/2019)  Plain Radiography of Left Breast (05/08/2019)  Biopsy, breast, with placement of breast localization device(s) (eg, clip, metallic pellet), when performed, and imaging of the biopsy specimen, when performed, percutaneous; first lesion, including stereotactic guidance (04/03/2019)  Excision of Left Breast, Percutaneous Approach, Diagnostic (04/03/2019)  Drainage of Genitalia Skin, External Approach (04/15/2017)  Drainage of Vulva, Open Approach (04/15/2017)  Incision and drainage of vulva or perineal abscess (04/15/2017)  Cyst - diagnostic aspiration (04/01/2017)  Hysterectomy (2006)  Tubal ligation (2000)  Breast biopsy and related procedures (1990)   Medications  atorvastatin 40 mg oral tablet, 40 mg= 1 tab(s), Oral, Daily, 4 refills  Blood pressure cuff, See Instructions  BP measuring device, See Instructions  hydrochlorothiazide-lisinopril 25 mg-20 mg oral tablet, 1 tab(s), Oral, Daily, 4 refills  Insulin Pen Needles 31G, See Instructions, 6 refills  ketorolac 0.4% ophthalmic solution, 1 drop(s), Eye-Left,  QID  Levemir FlexPen 100 units/mL subcutaneous solution, 34 units, Subcutaneous, Once a day (at bedtime), 11 refills  Macrobid 100 mg oral capsule, 100 mg= 1 cap(s), Oral, BID  metFORMIN 1000 mg oral tablet, 1000 mg= 1 tab(s), Oral, BID, 4 refills  Norvasc 10 mg oral tablet, 10 mg= 1 tab(s), Oral, Daily, 4 refills  Presto Glucometer, See Instructions  propylthiouracil 50 mg oral tablet, 100 mg= 2 tab(s), Oral, BID, 4 refills  test strips, See Instructions, 6 refills  Vitamin D3 2000 intl units (50 mcg) oral capsule, 2000 IntUnit= 1 cap(s), Oral, Daily, 3 refills  WAVESENSE PRESTO FRANSISCO, See Instructions, 5 refills  Allergies  No Known Allergies  Social History  Abuse/Neglect  No, 06/15/2021  Alcohol  Past, Started age 16 Years. Stopped age 23 Years. Previous treatment: None. Alcohol use interferes with work or home: No. Drinks more than intended: No. Others hurt by drinking: No. Ready to change: No. Household alcohol concerns: No., 11/19/2015  Employment/School  Retired, 10/05/2021  Exercise  Exercise duration: 20. Exercise frequency: 1-2 times/week. Exercise type: Walking., 04/12/2021  Home/Environment  Lives with Spouse. Living situation: Home/Independent. Alcohol abuse in household: No. Substance abuse in household: No. Smoker in household: No. Injuries/Abuse/Neglect in household: No. Feels unsafe at home: No. Safe place to go: Yes. Agency(s)/Others notified: No. Family/Friends available for support: Yes. Concern for family members at home: No. Major illness in household: No. Financial concerns: No. TV/Computer concerns: No., 11/19/2015    Never in , 04/12/2021  Nutrition/Health  Regular, Good, Diet restrictions: decrease portion sizes., 04/12/2021  Sexual  Gender Identity Identifies as female., 06/15/2021  Spiritual/Cultural  Roman Catholic, 04/12/2021  Substance Use  Never, 11/19/2015  Tobacco  Former smoker, quit more than 30 days ago, N/A, 06/15/2021  Family History  Diabetes mellitus type 1.:  Mother.  Hypertension.: Mother and Father.  Hypothyroidism.: Mother and Sister.  Health Maintenance  Health Maintenance  ???Pending?(in the next year)  ??? ??OverDue  ??? ? ? ?Aspirin Therapy for CVD Prevention due??04/24/19??and every 1??year(s)  ??? ? ? ?Hypertension Management-Education due??04/24/19??and every 1??year(s)  ??? ? ? ?Colorectal Screening due??08/05/19??and every 1??year(s)  ??? ? ? ?Diabetes Maintenance-Foot Exam due??12/03/20??and every 1??year(s)  ??? ? ? ?Alcohol Misuse Screening due??01/02/21??and every 1??year(s)  ??? ??Due?  ??? ? ? ?Diabetes Maintenance-Eye Exam due??10/05/21??and every 1??year(s)  ??? ? ? ?Zoster Vaccine due??10/05/21??Unknown Frequency  ??? ??Refused?  ??? ? ? ?Tetanus Vaccine due??10/05/21??and every 10??year(s)  ??? ??Due In Future?  ??? ? ? ?Diabetes Maintenance-HgbA1c not due until??12/24/21??and every 1??year(s)  ??? ? ? ?Hypertension Management-BMP not due until??12/24/21??and every 1??year(s)  ??? ? ? ?Diabetes Maintenance-Fasting Lipid Profile not due until??12/24/21??and every 1??year(s)  ??? ? ? ?Diabetes Maintenance-Serum Creatinine not due until??12/24/21??and every 1??year(s)  ??? ? ? ?Obesity Screening not due until??01/01/22??and every 1??year(s)  ??? ? ? ?Breast Cancer Screening not due until??02/04/22??and every 2??year(s)  ??? ? ? ?Depression Screening not due until??04/12/22??and every 1??year(s)  ??? ? ? ?ADL Screening not due until??04/12/22??and every 1??year(s)  ???Satisfied?(in the past 1 year)  ??? ??Satisfied?  ??? ? ? ?ADL Screening on??04/12/21.??Satisfied by Radha Pineda LPN  ??? ? ? ?Blood Pressure Screening on??10/05/21.??Satisfied by Patel Brown LPN  ??? ? ? ?Body Mass Index Check on??10/05/21.??Satisfied by Patel Brown LPN  ??? ? ? ?Depression Screening on??04/12/21.??Satisfied by Radha Pineda LPN  ??? ? ? ?Diabetes Maintenance-Eye Exam on??06/15/21.??Satisfied by Tammy Kamara  ??? ? ? ?Diabetes Screening  on??04/12/21.??Satisfied by Toby Umanzor Jr.  ??? ? ? ?Hypertension Management-Blood Pressure on??10/05/21.??Satisfied by Patel Brown LPN  ??? ? ? ?Influenza Vaccine on??10/05/21.??Satisfied by Patel Brown LPN  ??? ? ? ?Lipid Screening on??12/24/20.??Satisfied by Myra Rincon  ??? ? ? ?Obesity Screening on??10/05/21.??Satisfied by Patel Brown LPN  ??? ??Refused?  ??? ? ? ?Tetanus Vaccine on??10/05/21.??Recorded by Patel Brown LPN??Reason: Patient Refuses  ??? ? ? ?Zoster Vaccine on??10/05/21.??Recorded by Patel Brown LPN??Reason: Patient Refuses  ?

## 2022-05-03 NOTE — HISTORICAL [facility] CERNER
This is a historical note converted from Cerner. Formatting and pictures may have been removed.  Please reference Cerren for original formatting and attached multimedia. Chief Complaint  F/U may need med refills  History of Present Illness  Miss Min is a 57 yo AAF who is here for follow-up visit. PMH includes DM-II, hyperthyroidism, HTN, hyperlipidemia. Patient feels well without any complaints. ?On her last clinic visit in Feb 2017she was?asked to increase?Lantus by 1 unit from 25 till fasting CBG is between 80-13. She was started on Lantus 25 units at bedtime,?continued?with metformin 1000 mg twice a day?and started on glimeperide 2mg. Reports compliance with all her?meds. ?Denied any fever, chills, nausea, vomiting, heartburn,?chest pain, numbness, tingling, polyuria, dysuria, palpitations, anxiety, diarrhea or urinary complaints. ?No new complaints.  Review of Systems  Constitutional: No fever, chills, wt loss, weakness, or fatigue.  HEENT: Negative, No sinusitis, postnasal drip, sore throat  Respiratory: no cough, no SOB  Cardiovascular: No chest pain, palpitations, or peripheral edema.  Gastrointestinal: No n/v/d/c, No abdominal pain  Musculoskeletal: No chest wall pain or deformity  Integumentary: No rash  Neuro: No focal neuro deficit  ?  Physical Exam  Vitals & Measurements  T:?36.4? ?C ?(Oral)? HR:?76?(Peripheral)? BP:?124/76? HT:?165.10?cm? HT:?165.10?cm? WT:?65.5?kg? WT:?65.5?kg? BMI:?24.03?  General: Alert and oriented, No acute distress.  HEENT: NCAT, EOMI, Normal hearing, Upper airway clear  Respiratory: CTAB, No crackles/rhonchi/wheezes, Respirations non-labored, Breath sounds equal.  Cardiovascular: Normal rate, Regular rhythm  Gastrointestinal: Soft, NT, ND  Musculoskeletal: Normal range of motion, Normal strength.  Extremities: No clubbing/cyanosis/edema  Integumentary: Warm, Dry  Neurologic: No focal deficits  Psychiatric: Cooperative, Appropriate mood & affect, Normal  judgment.  Assessment/Plan  Orders:  amLODIPine, 10 mg = 1 tab(s), Oral, Daily, # 30 tab(s), 3 Refill(s)  atorvastatin, 40 mg = 1 tab(s), Oral, Daily, # 30 tab(s), 3 Refill(s)  glimepiride, 2 mg = 1 tab(s), Oral, Daily, # 30 tab(s), 3 Refill(s)  hydrochlorothiazide-lisinopril, 1 tab(s), Oral, Daily, # 90 tab(s), 3 Refill(s)  insulin detemir, 25 units, Subcutaneous, Once a day (at bedtime), Increase insulin by 1 unit every day until CBG is ., # 15 mL, 5 Refill(s)  metFORMIN, 1,000 mg = 1 tab(s), Oral, BID, # 60 tab(s), 3 Refill(s)  propylthiouracil, 100 mg = 2 tab(s), Oral, q8hr, # 180 tab(s), 11 Refill(s)  1. DM-II  -A1C=11.4?in 12/2016, 11.0 from 3/2016, 11.1 from 6/15/16  - started her on lantus 25 units (increase 1 unit daily till FBG ), today she states she did as she was told and now is on lantus 35 units, metformin 1000mg BID?and glimepiride 2mg  -asked to measure and keep a log of her blood sugar levels before breakfast and 2 after largest meal  -was referred to diabetic education on last visit  -diabetic foot exam in clinic today  -referred to opthalmology for diabetic eye exam on last visit  -will recheck HbA1c  ?   2. Peripheral neuropathy  -controlled, not reporting any burning or numbness, sensation intact in both feet  ?  ?   3. Essential Hypertension  - continue amlodipine and HCTZ-lisinopril  -advised to keep a BP log, low salt diet  ?   4. Hyperlipidemia  - on?Lipitor to 40 mg  -lipid panel from 12/2016 shows ,?triglyceride 99  -will? recheck  ?  ?  5. History of hyperthyroidism  -thyroid uptake scan normal, thyrotropin receptor Ab level WNL, unknown etiology  -denies any symptoms of thyrotoxicosis, continue on  mg 3 times a day  -TSH- 1.42, T4- 12.70?from December 2016  -? TSH and T4? wnl 2/2017  ?  6. Prevention  - screening mammogram 6/14-normal  -has had hysterectomy in past due to menorrhagia  -FOBT x 3 negative in 3/2017  -refused flu shot and pneumococcal  vaccine  ?  Follow up in?4 months.   Problem List/Past Medical History  No chronic problems  Historical  No historical problems  Procedure/Surgical History  Drainage of Genitalia Skin, External Approach (04/15/2017), Drainage of Vulva, Open Approach (04/15/2017), Incision and drainage of vulva or perineal abscess (04/15/2017), Cyst - diagnostic aspiration (04/01/2017), Hysterectomy (2006), Tubal ligation (2000), Breast biopsy and related procedures (1990).  Medications  atorvastatin 40 mg oral tablet, 40 mg, 1 tab(s), Oral, Daily, 3 refills  glimepiride 2 mg oral tablet, 2 mg, 1 tab(s), Oral, Daily, 3 refills  hydrochlorothiazide-lisinopril 25 mg-20 mg oral tablet, 1 tab(s), Oral, Daily, 3 refills  Insulin Pen Needles 31G, See Instructions, 5 refills  Levemir FlexPen 100 units/mL subcutaneous solution, 25 units, Subcutaneous, Once a day (at bedtime), 5 refills  metFORMIN 1000 mg oral tablet, 1000 mg, 1 tab(s), Oral, BID, 3 refills  Norvasc 10 mg oral tablet, 10 mg, 1 tab(s), Oral, Daily, 3 refills  propylthiouracil 50 mg oral tablet, 100 mg, 2 tab(s), Oral, q8hr, 11 refills  Allergies  No Known Allergies  Social History  Alcohol  Past, Started age 16 Years. Stopped age 23 Years. Previous treatment: None. Alcohol use interferes with work or home: No. Drinks more than intended: No. Others hurt by drinking: No. Ready to change: No. Household alcohol concerns: No.  Employment/School  Employed, Highest education level: High school. Operates hazardous equipment: No. Workplace hazards: Heavy lifting/twisting, Loud noises.  Exercise  Home/Environment  Lives with Spouse. Living situation: Home/Independent. Alcohol abuse in household: No. Substance abuse in household: No. Smoker in household: No. Injuries/Abuse/Neglect in household: No. Feels unsafe at home: No. Safe place to go: Yes. Agency(s)/Others notified: No. Family/Friends available for support: Yes. Concern for family members at home: No. Major illness in household:  No. Financial concerns: No. TV/Computer concerns: No.  Nutrition/Health  Diabetic, Caffeine intake amount: no caffiene intake. Sleeping concerns: No. Feels highly stressed: No.  Substance Abuse  Never  Tobacco  Former smoker, Cigarettes, Started age 17.0 Years. Stopped age 45 Years. Previous treatment: None. Ready to change: No. Household tobacco concerns: No.  Family History  Diabetes mellitus type 1.: Mother.  Hypertension.: Mother and Father.      I reviewed all HPI, past medical history , medications, physical exam findings and lab data. Case discussed with resident. I agree with treatment plan. Care provided is necessary and reasonable.  pt benefit from ACE inhibitor instead of CCB

## 2022-05-03 NOTE — HISTORICAL [facility]
This is a historical note converted from Mabel. Formatting and pictures may have been removed.  Please reference Mabel for original formatting and attached multimedia. ?  HPI: PT here for?CEIOL OS. Denies changes in health or medications since the patient was last seen in clinic.  ?   ROS: Negative x 10  ?   SLE:  Ext: wnl OU  L/L: wnl OU  C/S: white and quiet OU  K: clear OU  AC: deep and quiet OU  I: round and reactive OU  L: cataract OS  ?   General NAP  HENT atraumatic  Eyes: cataract OS  Neck: nontender, no masses or thyromegaly  Respiratory: no distress on room air  Cardiovascular: regular rate  Gastrointestinal: no hepatomegaly  Lymphatics: no lymphadenopathy  Musculoskeletal: wnl  ?  Assessment/Plan  1. Visually significant cataracts OS  - Pt complains of decreased vision  - Calcs obtained previously, review calcs before selecting lens  - After extensive discussion of R/B/A, informed consent was signed in clinic and reviewed today  - Plan for CE/IOL OS

## 2022-05-03 NOTE — HISTORICAL [facility] CERNER
This is a historical note converted from Cerren. Formatting and pictures may have been removed.  Please reference Mabel for original formatting and attached multimedia. Chief Complaint  difficulty urinating with pain to back  History of Present Illness  59-year-old female with 2 days of some difficulty initiating urine stream, when she initiates that she thinks she empties her bladder well.? Has had this before when she ran out of thyroid medicine, she was out of thyroid medicine for 2 weeks and just restarted.? She denies dysuria, no actual back or flank pain, no fever chills, no vomiting or diarrhea.  Chart reviewed-had E. coli UTI in February of this year.  Review of Systems  Constitutional: negative except as stated in HPI  Eye: negative except as stated in HPI  ENT: negative except as stated in HPI  Respiratory: negative except as stated in HPI  Cardiovascular: negative except as stated in HPI  Gastrointestinal: negative except as stated in HPI  Genitourinary: negative except as stated in HPI  Physical Exam  Vitals & Measurements  T:?36.7? ?C (Oral)? HR:?84(Peripheral)? RR:?20? BP:?172/84? SpO2:?100%?  HT:?166?cm? WT:?68?kg? BMI:?24.68?  VITAL SIGNS: ?Reviewed. ? ?  GENERAL: In no apparent distress  HEAD:? No signs of head trauma  EYES: Pupils are equal.? Extraocular motions intact  NECK: No adenopathy, no JVD??  CHEST: Clear breath sounds bilaterally.? No wheezes  CARDIAC: Regular rate and rhythm  ABDOMEN: Soft, nontender, nondistended, no rebound or guarding, no masses palpated, no CVA tenderness  MUSCULOSKELETAL: Good range of motion of all major joints. Extremities without edema  NEUROLOGIC EXAM: Alert and oriented x 3.? No focal sensory or strength deficits.?? Speech normal.? Follows commands  PSYCHIATRIC: Mood normal  SKIN: No visualized rash  ?  Urine dip-negative LE, negative nitrite  ?  ?  Assessment/Plan  1.?Difficulty urinating?R39.198  Orders:  1126F Pain severity quantified, no pain present,  04/27/20 10:06:00 CDT  1170F Functional Status Assessment, 04/27/20 10:06:00 CDT  3008F Body Mass Index (BMI), documented, 04/27/20 10:06:00 CDT  3077F Most recent systolic blood pressure, greater than or equal to 140 mm Hg, 04/27/20 10:06:00 CDT  3079F Most recent diastolic blood pressure, 80 - 89 mm Hg, 04/27/20 10:06:00 CDT  Urine Culture [id number], Stat collect, 04/27/20 10:04:00 CDT, Urine, Nurse collect, Stop date 04/27/20 10:05:00 CDT, UTI symptoms  Will send urine for?micro and culture and notify if needed.? Patient will monitor symptoms closely, restart thyroid medication.? Follow-up with her PCP.? Discussed COVID-19 precautions.   Problem List/Past Medical History  Ongoing  Diabetes  Diabetes mellitus type 2  Diabetic neuropathy  Fibroadenoma  HTN - Hypertension  Hyperlipidemia  Hyperthyroidism  Historical  No qualifying data  Procedure/Surgical History  Biopsy, breast, with placement of breast localization device(s) (eg, clip, metallic pellet), when performed, and imaging of the biopsy specimen, when performed, percutaneous; first lesion, including stereotactic guidance (05/13/2019)  Excision of Left Breast, Percutaneous Approach, Diagnostic (05/13/2019)  Placement of breast localization device(s) (eg, clip, metallic pellet, wire/needle, radioactive seeds), percutaneous; first lesion, including mammographic guidance (05/08/2019)  Plain Radiography of Left Breast (05/08/2019)  Biopsy, breast, with placement of breast localization device(s) (eg, clip, metallic pellet), when performed, and imaging of the biopsy specimen, when performed, percutaneous; first lesion, including stereotactic guidance (04/03/2019)  Excision of Left Breast, Percutaneous Approach, Diagnostic (04/03/2019)  Drainage of Genitalia Skin, External Approach (04/15/2017)  Drainage of Vulva, Open Approach (04/15/2017)  Incision and drainage of vulva or perineal abscess (04/15/2017)  Cyst - diagnostic aspiration (04/01/2017)  Hysterectomy  (2006)  Tubal ligation (2000)  Breast biopsy and related procedures (1990)   Medications  BP measuring device, See Instructions  glimepiride 2 mg oral tablet, 2 mg= 1 tab(s), Oral, Daily  guaifenesin 100 mg/5 mL oral liquid, 200 mg= 10 mL, Oral, q4hr,? ?Not Taking, Completed Rx  hydrochlorothiazide-lisinopril 25 mg-20 mg oral tablet, 1 tab(s), Oral, Daily, 2 refills  Insulin Pen Needles 31G, See Instructions, 6 refills  Levemir FlexPen 100 units/mL subcutaneous solution, 34 units, Subcutaneous, Once a day (at bedtime), 5 refills  metFORMIN 1000 mg oral tablet, 1000 mg= 1 tab(s), Oral, BID, 2 refills  nitrofurantoin macrocrystals-monohydrate 100 mg oral capsule, 100 mg= 1 cap(s), Oral, BID,? ?Not Taking, Completed Rx  Norvasc 10 mg oral tablet, 10 mg= 1 tab(s), Oral, Daily, 2 refills  pravastatin 10 mg oral tablet, 10 mg= 1 tab(s), Oral, Daily, 2 refills  Presto Glucometer, See Instructions  propylthiouracil 50 mg oral tablet, See Instructions, 2 refills  test strips, See Instructions, 6 refills  WAVESENSE PRESTO FRANSISCO, See Instructions, 5 refills  Allergies  No Known Allergies  Social History  Abuse/Neglect  No, 04/27/2020  Alcohol  Past, Started age 16 Years. Stopped age 23 Years. Previous treatment: None. Alcohol use interferes with work or home: No. Drinks more than intended: No. Others hurt by drinking: No. Ready to change: No. Household alcohol concerns: No., 11/19/2015  Employment/School  Employed, Highest education level: High school. Operates hazardous equipment: No. Workplace hazards: Heavy lifting/twisting, Loud noises., 11/19/2015  Exercise  Exercise type: Walking., 11/08/2018  Home/Environment  Lives with Spouse. Living situation: Home/Independent. Alcohol abuse in household: No. Substance abuse in household: No. Smoker in household: No. Injuries/Abuse/Neglect in household: No. Feels unsafe at home: No. Safe place to go: Yes. Agency(s)/Others notified: No. Family/Friends available for support: Yes. Concern  for family members at home: No. Major illness in household: No. Financial concerns: No. TV/Computer concerns: No., 11/19/2015  Nutrition/Health  Regular, 11/08/2018  Sexual  Gender Identity Identifies as female., 04/08/2019  Substance Use  Never, 11/19/2015  Tobacco  Former smoker, quit more than 30 days ago, No, 04/27/2020  Family History  Diabetes mellitus type 1.: Mother.  Hypertension.: Mother and Father.  Health Maintenance  Health Maintenance  ???Pending?(in the next year)  ??? ??OverDue  ??? ? ? ?Aspirin Therapy for CVD Prevention due??04/24/19??and every 1??year(s)  ??? ? ? ?Hypertension Management-Education due??04/24/19??and every 1??year(s)  ??? ? ? ?Colorectal Screening due??08/05/19??and every 1??year(s)  ??? ? ? ?Alcohol Misuse Screening due??01/01/20??and every 1??year(s)  ??? ? ? ?ADL Screening due??04/11/20??and every 1??year(s)  ??? ??Due?  ??? ? ? ?Cervical Cancer Screening due??04/28/20??and every?  ??? ? ? ?Zoster Vaccine due??04/28/20??and every 100??year(s)  ??? ??Refused?  ??? ? ? ?Tetanus Vaccine due??04/28/20??and every 10??year(s)  ??? ??Due In Future?  ??? ? ? ?Hypertension Management-BMP not due until??04/30/20??and every 1??year(s)  ??? ? ? ?Diabetes Maintenance-Serum Creatinine not due until??05/01/20??and every 1??year(s)  ??? ? ? ?Diabetes Maintenance-Fasting Lipid Profile not due until??05/05/20??and every 1??year(s)  ??? ? ? ?Diabetes Maintenance-Eye Exam not due until??10/27/20??and every 1??year(s)  ??? ? ? ?Depression Screening not due until??12/03/20??and every 1??year(s)  ??? ? ? ?Diabetes Maintenance-Foot Exam not due until??12/03/20??and every 1??year(s)  ??? ? ? ?Diabetes Maintenance-HgbA1c not due until??12/03/20??and every 1??year(s)  ??? ? ? ?Obesity Screening not due until??01/01/21??and every 1??year(s)  ??? ? ? ?Blood Pressure Screening not due until??04/27/21??and every 1??year(s)  ??? ? ? ?Body Mass Index Check not due until??04/27/21??and every 1??year(s)  ??? ? ?  ?Hypertension Management-Blood Pressure not due until??04/27/21??and every 1??year(s)  ??? ? ? ?Diabetes Maintenance-Urine Dipstick not due until??04/27/21??and every 1??year(s)  ???Satisfied?(in the past 1 year)  ??? ??Satisfied?  ??? ? ? ?Blood Pressure Screening on??04/27/20.??Satisfied by Celia Elder LPN  ??? ? ? ?Body Mass Index Check on??04/27/20.??Satisfied by Celia Elder LPN  ??? ? ? ?Breast Cancer Screening on??02/05/20.??Satisfied by Mouna Smith  ??? ? ? ?Depression Screening on??12/04/19.??Satisfied by Luisa Howard LPN.  ??? ? ? ?Diabetes Maintenance-Urine Dipstick on??04/27/20.??Satisfied by Lakshmi Cameron  ??? ? ? ?Diabetes Screening on??05/01/19.??Satisfied by Toby Umanzor Jr.  ??? ? ? ?Hypertension Management-Blood Pressure on??04/27/20.??Satisfied by Celia Elder LPN  ??? ? ? ?Influenza Vaccine on??02/11/20.??Satisfied by Celia Elder LPN  ??? ? ? ?Lipid Screening on??05/06/19.??Satisfied by Madelaine Ko  ??? ? ? ?Obesity Screening on??04/27/20.??Satisfied by Celia Elder LPN  ??? ??Refused?  ??? ? ? ?Colorectal Screening on??12/04/19.??Recorded by Luisa Howard LPN??Reason: Patient Refuses  ??? ? ? ?Influenza Vaccine on??12/04/19.??Recorded by Luisa Howard LPN??Reason: Patient Refuses  ?

## 2022-05-04 ENCOUNTER — OFFICE VISIT (OUTPATIENT)
Dept: OPHTHALMOLOGY | Facility: CLINIC | Age: 62
End: 2022-05-04

## 2022-05-04 VITALS — BODY MASS INDEX: 25.42 KG/M2 | WEIGHT: 152.56 LBS | HEIGHT: 65 IN

## 2022-05-04 DIAGNOSIS — H25.811 COMBINED FORMS OF AGE-RELATED CATARACT OF RIGHT EYE: Primary | ICD-10-CM

## 2022-05-04 DIAGNOSIS — H35.81 MACULAR EDEMA OF LEFT EYE: ICD-10-CM

## 2022-05-04 PROCEDURE — 92134 CPTRZ OPH DX IMG PST SGM RTA: CPT | Mod: PBBFAC,PO | Performed by: OPHTHALMOLOGY

## 2022-05-04 PROCEDURE — 99214 OFFICE O/P EST MOD 30 MIN: CPT | Mod: PBBFAC,PO | Performed by: OPHTHALMOLOGY

## 2022-05-04 RX ORDER — METFORMIN HYDROCHLORIDE 1000 MG/1
1000 TABLET ORAL 2 TIMES DAILY
COMMUNITY
Start: 2022-04-01 | End: 2022-12-27 | Stop reason: SDUPTHER

## 2022-05-04 RX ORDER — PROPYLTHIOURACIL 50 MG/1
100 TABLET ORAL 2 TIMES DAILY
COMMUNITY
Start: 2022-04-28 | End: 2023-02-22 | Stop reason: SDUPTHER

## 2022-05-04 RX ORDER — TIMOLOL MALEATE 5 MG/ML
1 SOLUTION/ DROPS OPHTHALMIC 2 TIMES DAILY
Qty: 10 ML | Refills: 1 | Status: SHIPPED | OUTPATIENT
Start: 2022-05-04 | End: 2022-07-03

## 2022-05-04 RX ORDER — CALCIUM CITRATE/VITAMIN D3 200MG-6.25
TABLET ORAL 2 TIMES DAILY
COMMUNITY
Start: 2022-04-07 | End: 2023-02-22 | Stop reason: SDUPTHER

## 2022-05-04 RX ORDER — PREDNISOLONE ACETATE 10 MG/ML
SUSPENSION/ DROPS OPHTHALMIC
COMMUNITY
Start: 2021-12-16

## 2022-05-04 RX ORDER — TIMOLOL MALEATE 2.5 MG/ML
1 SOLUTION/ DROPS OPHTHALMIC
COMMUNITY
Start: 2022-01-04 | End: 2022-08-26

## 2022-05-04 RX ORDER — LISINOPRIL 40 MG/1
40 TABLET ORAL DAILY
COMMUNITY
Start: 2022-04-04 | End: 2022-05-16

## 2022-05-04 RX ORDER — CHLORTHALIDONE 25 MG/1
25 TABLET ORAL DAILY
COMMUNITY
Start: 2022-04-04 | End: 2022-05-16

## 2022-05-04 RX ORDER — KETOROLAC TROMETHAMINE 4 MG/ML
SOLUTION/ DROPS OPHTHALMIC
COMMUNITY
Start: 2022-02-04

## 2022-05-04 RX ORDER — FERROUS SULFATE 325(65) MG
325 TABLET, DELAYED RELEASE (ENTERIC COATED) ORAL
COMMUNITY
Start: 2022-01-26 | End: 2022-05-16 | Stop reason: SDUPTHER

## 2022-05-04 RX ORDER — BLOOD-GLUCOSE METER
EACH MISCELLANEOUS
COMMUNITY
Start: 2022-01-28

## 2022-05-04 RX ORDER — CHLORTHALIDONE 25 MG/1
25 TABLET ORAL
COMMUNITY
Start: 2022-04-04 | End: 2022-05-16

## 2022-05-04 RX ORDER — ATORVASTATIN CALCIUM 40 MG/1
40 TABLET, FILM COATED ORAL
COMMUNITY
Start: 2022-01-26 | End: 2022-09-08 | Stop reason: SDUPTHER

## 2022-05-04 RX ORDER — AMLODIPINE BESYLATE 10 MG
10 TABLET ORAL DAILY
COMMUNITY
Start: 2022-02-28 | End: 2022-09-08 | Stop reason: SDUPTHER

## 2022-05-04 RX ORDER — INSULIN DETEMIR 100 [IU]/ML
INJECTION, SOLUTION SUBCUTANEOUS
COMMUNITY
Start: 2022-01-26 | End: 2022-05-16 | Stop reason: SDUPTHER

## 2022-05-04 RX ORDER — INSULIN DETEMIR 100 [IU]/ML
30 INJECTION, SOLUTION SUBCUTANEOUS NIGHTLY
COMMUNITY
Start: 2022-04-28 | End: 2023-02-22 | Stop reason: SDUPTHER

## 2022-05-04 RX ORDER — FERROUS SULFATE TAB 325 MG (65 MG ELEMENTAL FE) 325 (65 FE) MG
TAB ORAL EVERY OTHER DAY
COMMUNITY
Start: 2022-04-01 | End: 2023-02-22 | Stop reason: SDUPTHER

## 2022-05-04 RX ORDER — LISINOPRIL AND HYDROCHLOROTHIAZIDE 20; 25 MG/1; MG/1
1 TABLET ORAL DAILY
COMMUNITY
Start: 2022-03-10 | End: 2022-05-16 | Stop reason: DRUGHIGH

## 2022-05-04 RX ORDER — ATORVASTATIN CALCIUM 40 MG
40 TABLET ORAL DAILY
COMMUNITY
Start: 2022-04-28 | End: 2022-09-08 | Stop reason: SDUPTHER

## 2022-05-04 RX ORDER — LINAGLIPTIN 5 MG/1
5 TABLET, FILM COATED ORAL DAILY
COMMUNITY
Start: 2022-04-01 | End: 2022-09-15 | Stop reason: SDUPTHER

## 2022-05-04 NOTE — PROGRESS NOTES
Assessment /Plan     For exam results, see Encounter Report.    Combined forms of age-related cataract of right eye    Macular edema of left eye      1. PSK OS  - seen with Dr Richmond, residual macular edema likely DME not [place]  - pt supposed to be on PF daily, ketorolac QID  - pt with pressure spike OS in 01/2022, started on timolol BID OS  - IOP today 13 // 23 OS  - stop PF, ketorolac (pt taking each 1-2 times daily)  - continue timolol BID    2. Combined Cataract, Right Eye  - likely VS  - pt not interested in surgery    3. Severe NPDR  - OCT Mac 5/4/22 w improved IRF  - VA stable  - encouraged good control/regular PCP f/u    RTC 4 weeks IOP check             OCT MAC 5/4/22  OD: 227, NFC, no IRF/SRF  OS: 259, NFC, trace IRF, improved

## 2022-05-10 ENCOUNTER — TELEPHONE (OUTPATIENT)
Dept: OPHTHALMOLOGY | Facility: CLINIC | Age: 62
End: 2022-05-10

## 2022-05-10 NOTE — TELEPHONE ENCOUNTER
----- Message from Jami Bhagat sent at 5/10/2022  9:24 AM CDT -----  Regarding: General Message  Patient called stating that she came in last week for a visit and the  Prescribed her some drops with a yellow top. And she is wanting to know if it's the same drops as before because she states that her eyes are hurting really bad.    05/10/2022  10:29 AM  Explained that the Ketorlac gtts are the correct drops. Suggested putting the drops into the fridge prior to insertion to relieve  the burning.   Patient voiced understanding.

## 2022-05-16 ENCOUNTER — OFFICE VISIT (OUTPATIENT)
Dept: INTERNAL MEDICINE | Facility: CLINIC | Age: 62
End: 2022-05-16

## 2022-05-16 ENCOUNTER — LAB VISIT (OUTPATIENT)
Dept: LAB | Facility: HOSPITAL | Age: 62
End: 2022-05-16
Attending: STUDENT IN AN ORGANIZED HEALTH CARE EDUCATION/TRAINING PROGRAM

## 2022-05-16 VITALS
RESPIRATION RATE: 18 BRPM | WEIGHT: 149.94 LBS | HEART RATE: 81 BPM | HEIGHT: 65 IN | DIASTOLIC BLOOD PRESSURE: 80 MMHG | SYSTOLIC BLOOD PRESSURE: 152 MMHG | BODY MASS INDEX: 24.98 KG/M2 | TEMPERATURE: 99 F | OXYGEN SATURATION: 100 %

## 2022-05-16 DIAGNOSIS — Z12.12 SCREENING FOR COLORECTAL CANCER: ICD-10-CM

## 2022-05-16 DIAGNOSIS — E11.65 UNCONTROLLED TYPE 2 DIABETES MELLITUS WITH HYPERGLYCEMIA: ICD-10-CM

## 2022-05-16 DIAGNOSIS — H25.811 COMBINED FORMS OF AGE-RELATED CATARACT OF RIGHT EYE: ICD-10-CM

## 2022-05-16 DIAGNOSIS — H35.81 MACULAR EDEMA OF LEFT EYE: ICD-10-CM

## 2022-05-16 DIAGNOSIS — E78.2 MIXED HYPERLIPIDEMIA: ICD-10-CM

## 2022-05-16 DIAGNOSIS — Z11.3 SCREENING FOR STDS (SEXUALLY TRANSMITTED DISEASES): ICD-10-CM

## 2022-05-16 DIAGNOSIS — Z00.00 HEALTHCARE MAINTENANCE: ICD-10-CM

## 2022-05-16 DIAGNOSIS — E83.52 HYPERCALCEMIA: ICD-10-CM

## 2022-05-16 DIAGNOSIS — D50.8 IRON DEFICIENCY ANEMIA SECONDARY TO INADEQUATE DIETARY IRON INTAKE: ICD-10-CM

## 2022-05-16 DIAGNOSIS — E05.90 HYPERTHYROIDISM: ICD-10-CM

## 2022-05-16 DIAGNOSIS — I10 HYPERTENSION, UNSPECIFIED TYPE: ICD-10-CM

## 2022-05-16 DIAGNOSIS — R01.1 SYSTOLIC MURMUR: ICD-10-CM

## 2022-05-16 DIAGNOSIS — Z11.3 SCREENING FOR STDS (SEXUALLY TRANSMITTED DISEASES): Primary | ICD-10-CM

## 2022-05-16 DIAGNOSIS — Z12.11 SCREENING FOR COLORECTAL CANCER: ICD-10-CM

## 2022-05-16 DIAGNOSIS — E55.9 VITAMIN D DEFICIENCY: ICD-10-CM

## 2022-05-16 PROBLEM — D50.9 IDA (IRON DEFICIENCY ANEMIA): Status: ACTIVE | Noted: 2022-05-16

## 2022-05-16 PROBLEM — E78.5 HYPERLIPIDEMIA: Status: ACTIVE | Noted: 2022-05-16

## 2022-05-16 LAB
ALBUMIN SERPL-MCNC: 4.1 GM/DL (ref 3.4–4.8)
ALBUMIN/GLOB SERPL: 1 RATIO (ref 1.1–2)
ALP SERPL-CCNC: 60 UNIT/L (ref 40–150)
ALT SERPL-CCNC: 13 UNIT/L (ref 0–55)
AST SERPL-CCNC: 13 UNIT/L (ref 5–34)
BASOPHILS # BLD AUTO: 0.04 X10(3)/MCL (ref 0–0.2)
BASOPHILS NFR BLD AUTO: 0.5 %
BILIRUBIN DIRECT+TOT PNL SERPL-MCNC: 0.3 MG/DL
BUN SERPL-MCNC: 18.4 MG/DL (ref 9.8–20.1)
CALCIUM SERPL-MCNC: 10.5 MG/DL (ref 8.4–10.2)
CHLORIDE SERPL-SCNC: 102 MMOL/L (ref 98–107)
CO2 SERPL-SCNC: 25 MMOL/L (ref 23–31)
CREAT SERPL-MCNC: 0.75 MG/DL (ref 0.55–1.02)
DEPRECATED CALCIDIOL+CALCIFEROL SERPL-MC: 29.3 NG/ML (ref 30–80)
EOSINOPHIL # BLD AUTO: 0.1 X10(3)/MCL (ref 0–0.9)
EOSINOPHIL NFR BLD AUTO: 1.3 %
ERYTHROCYTE [DISTWIDTH] IN BLOOD BY AUTOMATED COUNT: 13.1 % (ref 11.5–17)
EST. AVERAGE GLUCOSE BLD GHB EST-MCNC: 154.2 MG/DL
GLOBULIN SER-MCNC: 4.3 GM/DL (ref 2.4–3.5)
GLUCOSE SERPL-MCNC: 133 MG/DL (ref 82–115)
HBA1C MFR BLD: 7 %
HBV CORE AB SERPL QL IA: NONREACTIVE
HCT VFR BLD AUTO: 36.5 % (ref 37–47)
HCV AB SERPL QL IA: NONREACTIVE
HGB BLD-MCNC: 11.2 GM/DL (ref 12–16)
HIV 1+2 AB+HIV1 P24 AG SERPL QL IA: NONREACTIVE
IMM GRANULOCYTES # BLD AUTO: 0.02 X10(3)/MCL (ref 0–0.02)
IMM GRANULOCYTES NFR BLD AUTO: 0.3 % (ref 0–0.43)
LYMPHOCYTES # BLD AUTO: 2.54 X10(3)/MCL (ref 0.6–4.6)
LYMPHOCYTES NFR BLD AUTO: 31.9 %
MCH RBC QN AUTO: 26.4 PG (ref 27–31)
MCHC RBC AUTO-ENTMCNC: 30.7 MG/DL (ref 33–36)
MCV RBC AUTO: 86.1 FL (ref 80–94)
MONOCYTES # BLD AUTO: 0.87 X10(3)/MCL (ref 0.1–1.3)
MONOCYTES NFR BLD AUTO: 10.9 %
NEUTROPHILS # BLD AUTO: 4.4 X10(3)/MCL (ref 2.1–9.2)
NEUTROPHILS NFR BLD AUTO: 55.1 %
NRBC BLD AUTO-RTO: 0 %
PLATELET # BLD AUTO: 351 X10(3)/MCL (ref 130–400)
PMV BLD AUTO: 10 FL (ref 9.4–12.4)
POTASSIUM SERPL-SCNC: 4.2 MMOL/L (ref 3.5–5.1)
PROT SERPL-MCNC: 8.4 GM/DL (ref 5.8–7.6)
PTH-INTACT SERPL-MCNC: 111 PG/ML (ref 8.7–77)
RBC # BLD AUTO: 4.24 X10(6)/MCL (ref 4.2–5.4)
SODIUM SERPL-SCNC: 137 MMOL/L (ref 136–145)
T PALLIDUM AB SER QL: NONREACTIVE
T PALLIDUM AB SER QL: NORMAL
T3FREE SERPL-MCNC: 2.85 PG/ML (ref 1.57–3.91)
T4 FREE SERPL-MCNC: 0.91 NG/DL (ref 0.7–1.48)
TSH SERPL-ACNC: 1.76 UIU/ML (ref 0.35–4.94)
WBC # SPEC AUTO: 8 X10(3)/MCL (ref 4.5–11.5)

## 2022-05-16 PROCEDURE — 82306 VITAMIN D 25 HYDROXY: CPT

## 2022-05-16 PROCEDURE — 86803 HEPATITIS C AB TEST: CPT

## 2022-05-16 PROCEDURE — 83036 HEMOGLOBIN GLYCOSYLATED A1C: CPT

## 2022-05-16 PROCEDURE — 85025 COMPLETE CBC W/AUTO DIFF WBC: CPT

## 2022-05-16 PROCEDURE — 86704 HEP B CORE ANTIBODY TOTAL: CPT

## 2022-05-16 PROCEDURE — 83970 ASSAY OF PARATHORMONE: CPT

## 2022-05-16 PROCEDURE — 84481 FREE ASSAY (FT-3): CPT

## 2022-05-16 PROCEDURE — 84439 ASSAY OF FREE THYROXINE: CPT

## 2022-05-16 PROCEDURE — 87340 HEPATITIS B SURFACE AG IA: CPT

## 2022-05-16 PROCEDURE — 80053 COMPREHEN METABOLIC PANEL: CPT

## 2022-05-16 PROCEDURE — 86780 TREPONEMA PALLIDUM: CPT

## 2022-05-16 PROCEDURE — 36415 COLL VENOUS BLD VENIPUNCTURE: CPT

## 2022-05-16 PROCEDURE — 99214 OFFICE O/P EST MOD 30 MIN: CPT | Mod: PBBFAC

## 2022-05-16 PROCEDURE — 87389 HIV-1 AG W/HIV-1&-2 AB AG IA: CPT

## 2022-05-16 PROCEDURE — 84443 ASSAY THYROID STIM HORMONE: CPT

## 2022-05-16 RX ORDER — LISINOPRIL 10 MG/1
30 TABLET ORAL DAILY
Qty: 90 TABLET | Refills: 3 | Status: SHIPPED | OUTPATIENT
Start: 2022-05-16 | End: 2022-08-26

## 2022-05-16 NOTE — ASSESSMENT & PLAN NOTE
vit D 9.6--> 22.1--> 30   Completed vitD 50,000 units for 8 weeks  Continue Vit D3 2000 units daily

## 2022-05-16 NOTE — ASSESSMENT & PLAN NOTE
No signs of active bleeding   FIT neg 5/2021   Continue iron sulfate 325 mg Monday, Wednesday, Friday

## 2022-05-16 NOTE — ASSESSMENT & PLAN NOTE
-Elevated LDL and triglycerides  -Cont Atorvastatin 40mg daily  -Advised on low fat/cholesterol diet  -Yearly fasting lipid panel

## 2022-05-16 NOTE — ASSESSMENT & PLAN NOTE
-Not controlled  -Non-complaint with medication regimen   -continue amlodipine 10mg daily  cardiology recs- lisinopril 40 and chlorthalidone 25 but pt refused to take this. Stopped after 4 days.   - Will stop combo pill since HCTZ might make hypercalcemia worse.   -increasing lisinopril to 30mg, pt does not want to go to 40mg right away. Explained risk of uncontrolled HTN worsening.   -BP check with nurse in 2 weeks, if still high, need to increase lisinopril to 40 mg daily   -Labs today  -advised to keep a BP log, low salt diet, will give script for BP measuring device again today

## 2022-05-16 NOTE — PROGRESS NOTES
[facility]  [address] [facility] [address]      PATIENT NAME: Chary Min  : 1960  DATE: 22  MRN: [id number]      Billing Provider: RESIDENT Godwin, [facility]  Level of Service:   Patient PCP Information     Provider PCP Type    Priscilla Quigley MD General          Reason for Visit / Chief Complaint: Follow-up (Lab results)       History of Present Illness / Problem Focused Workflow     Chary Min presents to the clinic with Follow-up (Lab results)     Patient is a 61-year-old -American female with past medical history significant for diabetes mellitus type II, hyperthyroidism, hypertension, hyperlipidemia, peripheral neuropathy presents to medicine clinic today for routine follow-up.     TODAY - No new acute complaints. Saw Cardiology a month back. They stopped Lis/HCTZ she was taking and was started on Lisinopril 40 and Chorthalidone 25. She was taking both at night and it made her feel queezy/dizzy. Stopped after 4 days and resumed her last combo pill without any sx. She wants to have her medications titrated slowly as tolerated without rapid changes.         Review of Systems     10-point ROS performed and negative except as above.      Medical / Social / Family History     Past Medical History:   Diagnosis Date    Cataract     Diabetic retinopathy        No past surgical history on file.    Social History  Ms.  reports that she has never smoked. She has never used smokeless tobacco.    Family History  Ms.'s Family history is unknown by patient.    Medications and Allergies     Medications  Outpatient Medications Marked as Taking for the 22 encounter (Office Visit) with RESIDENT Godwin, [facility]   Medication Sig Dispense Refill    atorvastatin (LIPITOR) 40 MG tablet Take 40 mg by mouth.      FEROSUL 325 mg (65 mg iron) Tab tablet Take by mouth every other day.      LEVEMIR FLEXTOUCH U-100  INSULN 100 unit/mL (3 mL) InPn pen Inject 30 Units into the skin every evening.      metFORMIN (GLUCOPHAGE) 1000 MG tablet Take 1,000 mg by mouth 2 (two) times daily.      NORVASC 10 mg tablet Take 10 mg by mouth once daily.      PRED FORTE 1 % DrpS INSTILL 1 DROP IN THE LEFT EYE THREE TIMES A DAY      propylthiouraciL (PTU) 50 mg Tab Take 100 mg by mouth 2 (two) times daily.      timolol maleate 0.25% (TIMOPTIC) 0.25 % Drop Apply 1 drop to eye.      timolol maleate 0.5% (TIMOPTIC) 0.5 % Drop Place 1 drop into the left eye 2 (two) times daily. 10 mL 1    TRADJENTA 5 mg Tab tablet Take 5 mg by mouth once daily.      TRUE METRIX GLUCOSE METER Misc use as directed      TRUE METRIX GLUCOSE TEST STRIP Strp 2 (two) times a day. Use to check blood glucose      [DISCONTINUED] insulin detemir U-100 (LEVEMIR FLEXTOUCH U-100 INSULN) 100 unit/mL (3 mL) InPn pen Inject into the skin.      [DISCONTINUED] lisinopriL-hydrochlorothiazide (PRINZIDE,ZESTORETIC) 20-25 mg Tab Take 1 tablet by mouth once daily.         Allergies  Review of patient's allergies indicates:  No Known Allergies    Physical Examination     Vitals:    05/16/22 0950   BP: (!) 152/80   Pulse: 81   Resp: 18   Temp: 98.8 °F (37.1 °C)         General: No acute distress  HEENT: Normocephalic, atraumatic. No scleral icterus.   Cardiovascular: Regular rate. Regular rhythm. Normal S1 & S2 w/out murmurs, rubs or gallops.  Pulmonary: Bilateral symmetric chest rise. Non-labored, CTAB. No wheezes, No crackles/rhonchi.   Abdominal:  Soft. nondistended. Bowel sounds present. No obvious masses. Not tender to palpation.   Extremities: No clubbing, cyanosis. No LE edema. Pulses good and equal bilaterally.   Skin:  Warm & dry. No rashes or skin lesions.   Neuro:   Awake, alert, & oriented to person, place & time. Responds to questions and follows simple commands appropriately. No focal deficits.   Psychiatric: Mood and affect normal     Diabetic foot exam: intact  pulses bilaterally, no calluses, no ulcers, intact sensations bilaterally, intact DTRs. Normal monofilament test.       Results   Reviewed recent labs.        Assessment and Plan (including Health Maintenance)     Problem List Items Addressed This Visit        Ophtho    Macular edema of left eye     Follow ophthalmology   Will defer mgt to them           Combined forms of age-related cataract of right eye     Follows opthalmology              Cardiac/Vascular    Hypertension     -Not controlled  -Non-complaint with medication regimen   -continue amlodipine 10mg daily  cardiology recs- lisinopril 40 and chlorthalidone 25 but pt refused to take this. Stopped after 4 days.   - Will stop combo pill since HCTZ might make hypercalcemia worse.   -increasing lisinopril to 30mg, pt does not want to go to 40mg right away. Explained risk of uncontrolled HTN worsening.   -BP check with nurse in 2 weeks, if still high, need to increase lisinopril to 40 mg daily   -Labs today  -advised to keep a BP log, low salt diet, will give script for BP measuring device again today               Relevant Medications    lisinopriL 10 MG tablet    Other Relevant Orders    CBC Auto Differential    Comprehensive Metabolic Panel    Hyperlipidemia     -Elevated LDL and triglycerides  -Cont Atorvastatin 40mg daily  -Advised on low fat/cholesterol diet  -Yearly fasting lipid panel              Relevant Orders    Comprehensive Metabolic Panel    Vitamin D    Systolic murmur     Following cardiology, New ECHO ordered by them since last one was unremarkable.              Renal/    Hypercalcemia     -Found to have elevated calcium and parathyroid hormone (83 in 1/2022).   -DEXA 2/2022  The average T score lumbar vertebrae is -1.3 which is within the range of osteopenia. The average T score of the femurs is -0.7 which is within normal range.  -Advised to hydrate well. and call back if she gets sx.   -follow up with endocrinology in 9/2022 for possible  primary hyperparathyroidism  -Will repeat CMP, Vit D and PTH                Relevant Orders    PTH, intact    Vitamin D       Oncology    FLAVIA (iron deficiency anemia)     No signs of active bleeding   FIT neg 5/2021   Continue iron sulfate 325 mg Monday, Wednesday, Friday             Relevant Orders    CBC Auto Differential       Endocrine    Diabetes mellitus type 2, uncontrolled     - Last A1c 8.5 in 1/2022  - Meds: Determir 30 units at bedtime, metformin 1000mg BID, Tradjenta 5 mg daily.  - d/c glimepiride 2mg in 2018. was started on Januvia but she stopped taking on her own because FBG sometimes in 90s. Stopped Tradjenta in 10/2021 on her own because FBG in 80-90s. No episode or sx of hypoglycemia.  -diabetic foot exam in clinic today, see physical exam   -diabetic eye exam -follows optho  -Proteinuria screening-ordered UPC/microalbumin  -asked to measure and keep a log of her blood sugar levels before breakfast and 2 hrs after largest meal  -advised on following diabetic diet and daily exercise  -Advised patient on the importance of medication compliance. Advised to stop discontinuing medications on her own without any symptoms or CBG is showing signs of hypoglycemia.           Relevant Orders    CBC Auto Differential    Comprehensive Metabolic Panel    Hemoglobin A1C    Urinalysis, Reflex to Urine Culture Urine, Clean Catch    Vitamin D deficiency     vit D 9.6--> 22.1--> 30   Completed vitD 50,000 units for 8 weeks  Continue Vit D3 2000 units daily               Hyperthyroidism     -thyroid uptake scan normal, thyrotropin receptor Ab level WNL, unknown etiology  -denies any symptoms of thyrotoxicosis  -Compliant with meds  -Continue  mg twice daily  -check TFTs every 3-6 m             Relevant Orders    TSH    T4, Free    T3, Free ([facility])       Other    Healthcare maintenance     ASCVD risk- 10.4 % . Cont Statin and Aspirin 81 mg daily.   HM labs (CBC, CMP, FLP, A1c, TSH, vitD): DUE  HIV/Hep  panel/syphilis: HIV rapid neg in 2017. DUE  Pneumococcal vaccine (due at 65 unless special situations): at 65  Tdap: refused  Zoster vaccine: refused  Flu: refused  FIT/colonoscopy: FIT neg in 10/2021. Will reorder   Lung cancer screening (LDCT age 50-80): NA  AAA screening (men, age 65-75): NA  Mammogram (after 40): 2/2022 BIRADS 1/2  DEXA scan: 2/2022 with osteopenia, rpt in 2-3 yrs   GYN (pap smears): had hysterectomy in past due to menorrhagia-follows GYN               Relevant Orders    Cologuard Screening (Multitarget Stool DNA)    HIV 1/2 Ag/Ab (4th Gen)    Hepatitis B Surface Antigen    Hepatitis B Core Antibody, Total    SYPHILIS ANTIBODY (WITH REFLEX RPR)    Hepatitis C Antibody      Other Visit Diagnoses     Screening for STDs (sexually transmitted diseases)    -  Primary    Relevant Orders    HIV 1/2 Ag/Ab (4th Gen)    Hepatitis B Surface Antigen    Hepatitis B Core Antibody, Total    SYPHILIS ANTIBODY (WITH REFLEX RPR)    Hepatitis C Antibody    Screening for colorectal cancer        Relevant Orders    Cologuard Screening (Multitarget Stool DNA)           Health Maintenance Due   Topic Date Due    Hepatitis C Screening  Never done    Cervical Cancer Screening  Never done    Foot Exam  Never done    HIV Screening  Never done    TETANUS VACCINE  Never done    Colorectal Cancer Screening  Never done    Shingles Vaccine (1 of 2) Never done    Mammogram  05/13/2020    Lipid Panel  12/24/2021    COVID-19 Vaccine (3 - Booster for Moderna series) 01/14/2022    Hemoglobin A1c  04/27/2022         Health Maintenance Topics with due status: Not Due       Topic Last Completion Date    Diabetes Urine Screening 01/27/2022    Eye Exam 05/04/2022    Low Dose Statin 05/16/2022    Influenza Vaccine Not Due       Follow up in about 3 months (around 8/16/2022) for IM clinic follow up .     Future Appointments   Date Time Provider Department Center   5/26/2022  9:00 AM PROVIDERS, [facility] [place] [facility]    5/30/2022  8:30 AM NURSE, [facility] [place] [facility]   9/8/2022  7:50 AM RESIDENT 13, [facility] IM RES [place]   9/15/2022 10:15 AM Payton Damian NP [facility] [place] [facility]            Signature:  Priscilla Quigley MD  [facility]  [address] [facility] [address]    Date of encounter: 5/16/22

## 2022-05-16 NOTE — ASSESSMENT & PLAN NOTE
ASCVD risk- 10.4 % . Cont Statin and Aspirin 81 mg daily.   HM labs (CBC, CMP, FLP, A1c, TSH, vitD): DUE  HIV/Hep panel/syphilis: HIV rapid neg in 2017. DUE  Pneumococcal vaccine (due at 65 unless special situations): at 65  Tdap: refused  Zoster vaccine: refused  Flu: refused  FIT/colonoscopy: FIT neg in 10/2021. Will reorder   Lung cancer screening (LDCT age 50-80): NA  AAA screening (men, age 65-75): NA  Mammogram (after 40): 2/2022 BIRADS 1/2  DEXA scan: 2/2022 with osteopenia, rpt in 2-3 yrs   GYN (pap smears): had hysterectomy in past due to menorrhagia-follows GYN

## 2022-05-16 NOTE — PROGRESS NOTES
[facility]  [address] [facility] [address]      PATIENT NAME: Chary Min  : 1960  DATE: 22  MRN: [id number]      Billing Provider: RESIDENT Godwin, [facility]  Level of Service:   Patient PCP Information     Provider PCP Type    Priscilla Quigley MD General          Reason for Visit / Chief Complaint: No chief complaint on file.       History of Present Illness / Problem Focused Workflow     Chary Min presents to the clinic with No chief complaint on file.           Review of Systems     10-point ROS performed and negative except as above.      Medical / Social / Family History     Past Medical History:   Diagnosis Date    Cataract     Diabetic retinopathy        No past surgical history on file.    Social History  Ms.  reports that she has never smoked. She has never used smokeless tobacco.    Family History  Ms.'s Family history is unknown by patient.    Medications and Allergies     Medications  No outpatient medications have been marked as taking for the 22 encounter (Office Visit) with RESIDENT Tato, [facility].       Allergies  Review of patient's allergies indicates:  No Known Allergies    Physical Examination   There were no vitals filed for this visit.      General: No acute distress  HEENT: Normocephalic, atraumatic. Face symmetric. Mucous membranes moist. No scleral icterus.   Cardiovascular: Regular rate. Regular rhythm. Normal S1 & S2 w/out murmurs, rubs or gallops.  Pulmonary: Bilateral symmetric chest rise. Non-labored, CTAB. No wheezes, No crackles/rhonchi.   Abdominal:  Soft. nondistended. Bowel sounds present. No obvious masses. Not tender to palpation.   Extremities: No clubbing, cyanosis. No LE edema. Pulses good and equal bilaterally.   Skin:  Warm & dry. No rashes or skin lesions.   Neuro:   Awake, alert, & oriented to person, place & time. Responds to questions and follows simple  commands appropriately.   Psychiatric: Mood and affect normal         Results   Reviewed recent labs.        Assessment and Plan (including Health Maintenance)     Plan:   Diagnoses and all orders for this visit:    Hypertension, unspecified type    Uncontrolled type 2 diabetes mellitus with hyperglycemia    Vitamin D deficiency    Mixed hyperlipidemia    Macular edema of left eye    Combined forms of age-related cataract of right eye    Hyperthyroidism    Iron deficiency anemia secondary to inadequate dietary iron intake            Health Maintenance Due   Topic Date Due    Hepatitis C Screening  Never done    Cervical Cancer Screening  Never done    Foot Exam  Never done    HIV Screening  Never done    TETANUS VACCINE  Never done    Colorectal Cancer Screening  Never done    Shingles Vaccine (1 of 2) Never done    Mammogram  05/13/2020    Lipid Panel  12/24/2021    COVID-19 Vaccine (3 - Booster for Moderna series) 01/14/2022    Hemoglobin A1c  04/27/2022         Health Maintenance Topics with due status: Not Due       Topic Last Completion Date    Diabetes Urine Screening 01/27/2022    Low Dose Statin 05/04/2022    Eye Exam 05/04/2022    Influenza Vaccine Not Due       No follow-ups on file.     Future Appointments   Date Time Provider Department Center   5/26/2022  9:00 AM PROVIDERS, [facility]    9/15/2022 10:15 AM Payton Damian NP [facility]            Signature:  Priscilla Quigley MD  [facility]  [address] [facility] [address]    Date of encounter: 5/16/22

## 2022-05-16 NOTE — PROGRESS NOTES
[facility]  [address] [facility] [address]      PATIENT NAME: Chary Min  : 1960  DATE: 22  MRN: [id number]      Billing Provider: RESIDENT Godwin, [facility]  Level of Service:   Patient PCP Information     Provider PCP Type    Priscilla Quigley MD General          Reason for Visit / Chief Complaint: No chief complaint on file.       History of Present Illness / Problem Focused Workflow     Chary Min presents to the clinic with No chief complaint on file.     dfdfdfd    Review of Systems     10-point ROS performed and negative except as above.      Medical / Social / Family History     Past Medical History:   Diagnosis Date    Cataract     Diabetic retinopathy        No past surgical history on file.    Social History  Ms.  reports that she has never smoked. She has never used smokeless tobacco.    Family History  Ms.'s Family history is unknown by patient.    Medications and Allergies     Medications  No outpatient medications have been marked as taking for the 22 encounter (Appointment) with RESIDENT Tato, [facility].       Allergies  Review of patient's allergies indicates:  No Known Allergies    Physical Examination   There were no vitals filed for this visit.  General: AAOx3, NAD, alert and cooperative  HEENT: PERRLA, EOMI, normal conjunctiva  Neck: no LAD, no JVD, supple, no masses or thyromegaly  CVS: S1/S2 nml, RRR, no murmurs, rubs or gallops, no carotid bruits  Resp: CTA B/L, no rhonchi, rales, or wheezing  GI: no TTP, not distended, BS+  : no CVA tenderness  Skin: not jaundiced, warm, no rashes  Musculoskeletal: dfdfdl ROM, no joint tenderness, normal muscular development  Extremities: no peripheral edema, peripheral pulses intact  Neuro: CN II-XII grossly intact, strength and sensation symmetric and intact throughout, no focal neurological deficits        Results   Reviewed recent  labs.        Assessment and Plan (including Health Maintenance)     Plan:   Diagnoses and all orders for this visit:    Hypertension, unspecified type            Health Maintenance Due   Topic Date Due    Hepatitis C Screening  Never done    Cervical Cancer Screening  Never done    Foot Exam  Never done    HIV Screening  Never done    TETANUS VACCINE  Never done    Colorectal Cancer Screening  Never done    Shingles Vaccine (1 of 2) Never done    Mammogram  05/13/2020    Lipid Panel  12/24/2021    COVID-19 Vaccine (3 - Booster for Moderna series) 01/14/2022    Hemoglobin A1c  04/27/2022         Health Maintenance Topics with due status: Not Due       Topic Last Completion Date    Diabetes Urine Screening 01/27/2022    Low Dose Statin 05/04/2022    Eye Exam 05/04/2022    Influenza Vaccine Not Due       No follow-ups on file.     Future Appointments   Date Time Provider Department Center   5/16/2022  9:00 AM RESIDENT 13, [facility]   5/26/2022  9:00 AM PROVID[facility], [facility]   9/15/2022 10:15 AM Payton Damian NP [facility] ENDOCR [facility] Un            Signature:  Ang Simmons MD  [facility]  [address] [facility] [facility] [address]    Date of encounter: 5/16/22

## 2022-05-16 NOTE — ASSESSMENT & PLAN NOTE
-Found to have elevated calcium and parathyroid hormone (83 in 1/2022).   -DEXA 2/2022  The average T score lumbar vertebrae is -1.3 which is within the range of osteopenia. The average T score of the femurs is -0.7 which is within normal range.  -Advised to hydrate well. and call back if she gets sx.   -follow up with endocrinology in 9/2022 for possible primary hyperparathyroidism  -Will repeat CMP, Vit D and PTH

## 2022-05-16 NOTE — ASSESSMENT & PLAN NOTE
-thyroid uptake scan normal, thyrotropin receptor Ab level WNL, unknown etiology  -denies any symptoms of thyrotoxicosis  -Compliant with meds  -Continue  mg twice daily  -check TFTs every 3-6 m

## 2022-05-16 NOTE — ASSESSMENT & PLAN NOTE
- Last A1c 8.5 in 1/2022  - Meds: Determir 30 units at bedtime, metformin 1000mg BID, Tradjenta 5 mg daily.  - d/c glimepiride 2mg in 2018. was started on Januvia but she stopped taking on her own because FBG sometimes in 90s. Stopped Tradjenta in 10/2021 on her own because FBG in 80-90s. No episode or sx of hypoglycemia.  -diabetic foot exam in clinic today, see physical exam   -diabetic eye exam -follows optho  -Proteinuria screening-ordered UPC/microalbumin  -asked to measure and keep a log of her blood sugar levels before breakfast and 2 hrs after largest meal  -advised on following diabetic diet and daily exercise  -Advised patient on the importance of medication compliance. Advised to stop discontinuing medications on her own without any symptoms or CBG is showing signs of hypoglycemia.

## 2022-05-17 LAB — HBV SURFACE AG SERPL QL IA: NONREACTIVE

## 2022-05-17 NOTE — PROGRESS NOTES
Attending Physician Addendum  Management and plan discussed with resident at time of clinic visit. Care was reasonable and  necessary. Routine follow up.

## 2022-05-26 ENCOUNTER — OFFICE VISIT (OUTPATIENT)
Dept: OPHTHALMOLOGY | Facility: CLINIC | Age: 62
End: 2022-05-26

## 2022-05-26 VITALS — BODY MASS INDEX: 24.98 KG/M2 | WEIGHT: 149.94 LBS | HEIGHT: 65 IN

## 2022-05-26 DIAGNOSIS — E11.3413 SEVERE NONPROLIFERATIVE DIABETIC RETINOPATHY OF BOTH EYES WITH MACULAR EDEMA ASSOCIATED WITH TYPE 2 DIABETES MELLITUS: ICD-10-CM

## 2022-05-26 DIAGNOSIS — H35.81 MACULAR EDEMA OF LEFT EYE: Primary | ICD-10-CM

## 2022-05-26 PROCEDURE — 92134 CPTRZ OPH DX IMG PST SGM RTA: CPT | Mod: PBBFAC,PO | Performed by: STUDENT IN AN ORGANIZED HEALTH CARE EDUCATION/TRAINING PROGRAM

## 2022-05-26 PROCEDURE — 99213 OFFICE O/P EST LOW 20 MIN: CPT | Mod: PBBFAC,PO | Performed by: STUDENT IN AN ORGANIZED HEALTH CARE EDUCATION/TRAINING PROGRAM

## 2022-05-26 PROCEDURE — 92134 CPTRZ OPH DX IMG PST SGM RTA: CPT | Mod: PBBFAC,PO | Performed by: OPHTHALMOLOGY

## 2022-05-26 NOTE — PROGRESS NOTES
OCT mac (5/26/22):  OD: normal foveal contour  OS: hard exudates, edema inferotemporally    Assessment/Plan:  1. PSK OS  - s/p CEIOL OS 12/1/2020 w IG vs DME  - MRx given 11/16/21 - snellen did not improve but wants bifocals  - OCT mac stable  - D/W Dr. Richmond 2/4/22: IVFA consistent with DME more than IG, OCT mac is stable and vision is preserved. Does not recommend avastin at this time unless vision acutely worsens.  - Edema stable off drops, likely NVS  - RTC 3 mo OCT Mac, DFE    2. Severe NPDR OU  -IVFA 7/2021 with MAs, but no NV  -Severe NPDR on repeat exam (6/15/2021)    3. NSC OD  -Patient wishes to wait at this time     RTC 3 mo DFE OCT mac

## 2022-05-27 ENCOUNTER — TELEPHONE (OUTPATIENT)
Dept: INTERNAL MEDICINE | Facility: CLINIC | Age: 62
End: 2022-05-27

## 2022-05-27 NOTE — PROGRESS NOTES
Here for BP Check per DR LIZZETH Quigley    BP Readin/76 manual reading    TX:  83    Last dose of Medications: Lisinopril 10 mg and amlodipine 10 mg both taken 22 @ 0630 am. Lisinopril 20 mg taken 22 @ 2100 om.      Complaints: none.   Provider sent  Blood Pressure reading..                   Discharged home with instructions and information to continue with all prescribed medications,follow up appointments, drink plenty of water daily, maintain low sodium intake and to walk/ exercise daily.Patient voiced understanding of discharge instructions.

## 2022-05-27 NOTE — TELEPHONE ENCOUNTER
Phoned patient and reminded her of BP check  Nurse visit appointment Monday, May 30,2022 at 0830 am. Patient instructed to take morning blood pressure medications at least 2 hours prior to her appointment time. ( 0630 am)

## 2022-05-30 ENCOUNTER — CLINICAL SUPPORT (OUTPATIENT)
Dept: INTERNAL MEDICINE | Facility: CLINIC | Age: 62
End: 2022-05-30

## 2022-05-30 VITALS
BODY MASS INDEX: 25.23 KG/M2 | WEIGHT: 151.44 LBS | HEART RATE: 83 BPM | HEIGHT: 65 IN | RESPIRATION RATE: 18 BRPM | DIASTOLIC BLOOD PRESSURE: 76 MMHG | TEMPERATURE: 98 F | SYSTOLIC BLOOD PRESSURE: 136 MMHG

## 2022-05-30 DIAGNOSIS — I10 HYPERTENSION, UNSPECIFIED TYPE: Primary | ICD-10-CM

## 2022-05-30 PROCEDURE — 99214 OFFICE O/P EST MOD 30 MIN: CPT | Mod: PBBFAC

## 2022-06-15 DIAGNOSIS — Z12.12 SCREENING FOR COLORECTAL CANCER: Primary | ICD-10-CM

## 2022-06-15 DIAGNOSIS — Z12.11 SCREENING FOR COLORECTAL CANCER: Primary | ICD-10-CM

## 2022-07-28 ENCOUNTER — OFFICE VISIT (OUTPATIENT)
Dept: CARDIOLOGY | Facility: CLINIC | Age: 62
End: 2022-07-28

## 2022-07-28 VITALS
SYSTOLIC BLOOD PRESSURE: 153 MMHG | OXYGEN SATURATION: 100 % | HEIGHT: 65 IN | RESPIRATION RATE: 20 BRPM | HEART RATE: 84 BPM | WEIGHT: 152.31 LBS | DIASTOLIC BLOOD PRESSURE: 75 MMHG | BODY MASS INDEX: 25.38 KG/M2 | TEMPERATURE: 98 F

## 2022-07-28 DIAGNOSIS — I10 PRIMARY HYPERTENSION: Primary | ICD-10-CM

## 2022-07-28 DIAGNOSIS — E78.2 MIXED HYPERLIPIDEMIA: ICD-10-CM

## 2022-07-28 PROCEDURE — 99214 OFFICE O/P EST MOD 30 MIN: CPT | Mod: PBBFAC | Performed by: INTERNAL MEDICINE

## 2022-07-28 RX ORDER — ACETAMINOPHEN 500 MG
2000 TABLET ORAL EVERY OTHER DAY
COMMUNITY
Start: 2022-01-26 | End: 2023-01-21

## 2022-07-28 NOTE — PROGRESS NOTES
"CHIEF COMPLAINT:   Chief Complaint   Patient presents with    3 month f/u and test results denies chest pain/sob                             HPI:    Ms. Chary Min is a 62 y.o. female.      CP:  The patient had no chest discomfort in the past 2 months.     SOB:  The patient has no shortness of breath.     PALPITATIONS:  The patient has no palpitations.    LEVEL OF EXERTION:  The patient does house work.  The patient can walk from parking lot to hospital.  The patient's level of exertion is fair.    ROS:                                                                                                                                                                             CONSTITUTIONAL:    No fever.  + night sweats.  RESPIRATORY:  No cough.  No hemoptysis.  No wheezing.  SKIN:  No poor wound healing.  No rash.  CARDIOVASCULAR:  No claudication.  No cyanosis.  No leg swelling.  No Near syncope.  No syncope.  No orthopnea.  No PND.     HEMATOLOGY:   No adenopathy.  No easy bruising.   MUSCULOSKELETAL:  No muscle cramp.  No muscle weakness.  GASTROENTEROLOGY:  No heart burn.  No melena.    NEUROLOGIC:  + dizziness if stand up too fast or stoop over.  No generalized weakness.       PE:  Blood pressure (!) 153/75, pulse 84, temperature 98.1 °F (36.7 °C), temperature source Oral, resp. rate 20, height 5' 5" (1.651 m), weight 69.1 kg (152 lb 5.4 oz), SpO2 100 %.  CONSTITUTIONAL:  No acute distress.  Not ill appearing.  NECK:  No cervical adenopathy.  No carotid bruit.  CARDIOVASCULAR:  Normal rate.  Regular rhythm.  No murmur.  PULMONARY:  No respiratory distress.  No wheezing.  No crackles.  ABDOMINAL:  No distention.  No tenderness.  MUSCULOSKELETAL:  No deformity.  No edema.  SKIN:  No bruising.  No rash.  NEUROLOGICAL:  Oriented x 3.  No weakness.                                                                                                                                                                         "                                                                                                                                                                                                                                                                                                       CARDIAC TESTING:  Nuclear stress test 4/25/22:  No ischemia.  No scar.    Echo 4/27/22:  EF 60%, Mohr dysfunction.                                  Patient Active Problem List   Diagnosis    Macular edema of left eye    Combined forms of age-related cataract of right eye    Hypertension    Diabetes mellitus type 2, uncontrolled    Vitamin D deficiency    Hyperlipidemia    Hyperthyroidism    FLAVIA (iron deficiency anemia)    Hypercalcemia    Systolic murmur    Healthcare maintenance       Past Surgical History:   Procedure Laterality Date    BREAST SURGERY      EYE SURGERY      HYSTERECTOMY      TUBAL LIGATION         Social History     Socioeconomic History    Marital status:    Tobacco Use    Smoking status: Never Smoker    Smokeless tobacco: Never Used   Substance and Sexual Activity    Alcohol use: Not Currently    Drug use: Never    Sexual activity: Yes     Partners: Male          Family History   Family history unknown: Yes       Review of patient's allergies indicates:  No Known Allergies      Current Outpatient Medications:     atorvastatin (LIPITOR) 40 MG tablet, Take 40 mg by mouth., Disp: , Rfl:     cholecalciferol, vitamin D3, (VITAMIN D3) 50 mcg (2,000 unit) Cap capsule, Take 2,000 Int'l Units by mouth every other day., Disp: , Rfl:     FEROSUL 325 mg (65 mg iron) Tab tablet, Take by mouth every other day., Disp: , Rfl:     lisinopriL 10 MG tablet, Take 3 tablets (30 mg total) by mouth once daily., Disp: 90 tablet, Rfl: 3    metFORMIN (GLUCOPHAGE) 1000 MG tablet, Take 1,000 mg by mouth 2 (two) times daily., Disp: , Rfl:     NORVASC 10 mg tablet, Take 10 mg by mouth once daily., Disp:  , Rfl:     propylthiouraciL (PTU) 50 mg Tab, Take 100 mg by mouth 2 (two) times daily., Disp: , Rfl:     TRADJENTA 5 mg Tab tablet, Take 5 mg by mouth once daily., Disp: , Rfl:     ketorolac 0.4% (ACULAR) 0.4 % Drop, PLACE 1 DROP IN THE LEFT EYE FOUR TIMES A DAY, Disp: , Rfl:     LEVEMIR FLEXTOUCH U-100 INSULN 100 unit/mL (3 mL) InPn pen, Inject 30 Units into the skin every evening., Disp: , Rfl:     LIPITOR 40 mg tablet, Take 40 mg by mouth once daily., Disp: , Rfl:     PRED FORTE 1 % DrpS, INSTILL 1 DROP IN THE LEFT EYE THREE TIMES A DAY, Disp: , Rfl:     timolol maleate 0.25% (TIMOPTIC) 0.25 % Drop, Apply 1 drop to eye., Disp: , Rfl:     TRUE METRIX GLUCOSE METER Misc, use as directed, Disp: , Rfl:     TRUE METRIX GLUCOSE TEST STRIP Strp, 2 (two) times a day. Use to check blood glucose, Disp: , Rfl:      ASSESSMENT/PLAN:  Chary was seen today for 3 month f/u and test results denies chest pain/sob.    Diagnoses and all orders for this visit:    Primary hypertension  Nursing visit (next available) for BP check  If BP cont to be high, consider changing norvasc to procardia     Mixed hyperlipidemia  Continue lipitor     F/U in 1 year         Williams Abrams MD

## 2022-08-15 PROBLEM — Z00.00 HEALTHCARE MAINTENANCE: Status: RESOLVED | Noted: 2022-05-16 | Resolved: 2022-08-15

## 2022-08-26 ENCOUNTER — OFFICE VISIT (OUTPATIENT)
Dept: OPHTHALMOLOGY | Facility: CLINIC | Age: 62
End: 2022-08-26

## 2022-08-26 VITALS — HEIGHT: 65 IN | BODY MASS INDEX: 25.34 KG/M2 | WEIGHT: 152.13 LBS

## 2022-08-26 DIAGNOSIS — E11.3413 SEVERE NONPROLIFERATIVE DIABETIC RETINOPATHY OF BOTH EYES WITH MACULAR EDEMA ASSOCIATED WITH TYPE 2 DIABETES MELLITUS: Primary | ICD-10-CM

## 2022-08-26 DIAGNOSIS — H35.81 MACULAR EDEMA OF LEFT EYE: ICD-10-CM

## 2022-08-26 DIAGNOSIS — H25.811 COMBINED FORMS OF AGE-RELATED CATARACT OF RIGHT EYE: ICD-10-CM

## 2022-08-26 PROCEDURE — 99213 OFFICE O/P EST LOW 20 MIN: CPT | Mod: PBBFAC,PO | Performed by: STUDENT IN AN ORGANIZED HEALTH CARE EDUCATION/TRAINING PROGRAM

## 2022-08-26 PROCEDURE — 92133 CPTRZD OPH DX IMG PST SGM ON: CPT | Mod: PBBFAC,PO | Performed by: STUDENT IN AN ORGANIZED HEALTH CARE EDUCATION/TRAINING PROGRAM

## 2022-08-26 PROCEDURE — 92134 CPTRZ OPH DX IMG PST SGM RTA: CPT | Mod: PBBFAC,PO | Performed by: OPHTHALMOLOGY

## 2022-08-26 RX ORDER — TIMOLOL 2.56 MG/ML
1 SOLUTION/ DROPS OPHTHALMIC 2 TIMES DAILY
COMMUNITY
Start: 2022-08-23 | End: 2023-01-04 | Stop reason: SDUPTHER

## 2022-08-26 RX ORDER — PEN NEEDLE, DIABETIC 32GX 5/32"
NEEDLE, DISPOSABLE MISCELLANEOUS
COMMUNITY
Start: 2022-04-28 | End: 2023-05-15 | Stop reason: SDUPTHER

## 2022-08-26 RX ORDER — LISINOPRIL AND HYDROCHLOROTHIAZIDE 20; 25 MG/1; MG/1
TABLET ORAL
COMMUNITY
Start: 2021-10-05 | End: 2022-09-08 | Stop reason: ALTCHOICE

## 2022-08-26 NOTE — PROGRESS NOTES
HPI     RTC 3 mo DFE OCT mac  Patient does not want to dilate her eyes due to she has to drive    Last edited by Marie Wills RN on 8/26/2022  7:52 AM. (History)            Assessment /Plan     For exam results, see Encounter Report.    Severe nonproliferative diabetic retinopathy of both eyes with macular edema associated with type 2 diabetes mellitus    Combined forms of age-related cataract of right eye    Macular edema of left eye        OCT Mac 8/26/2022  OD: 229, normal foveal contour  OS: 252, trace IRF, stable since May 2022 but improved significantly since 2/2022    Prior Testing -  OCT mac (5/26/22):  OD: normal foveal contour  OS: hard exudates, edema inferotemporally    OCT MAC 5/4/22  OD: 227, NFC, no IRF/SRF  OS: 259, NFC, trace IRF, improved     Assessment/Plan:  1. PSK OS  - s/p CEIOL OS 12/1/2020 w IG vs DME  - OCT mac stable  - D/W Dr. Richmond 2/4/22: IVFA consistent with DME more than IG, OCT mac is stable and vision is preserved. Does not recommend avastin at this time unless vision acutely worsens.  - Edema stable off drops, likely NVS. Patient still on timolol OU  - Pt refused dilation today (last DFE 5/4/2022). BCVA today 20/30     2. Severe NPDR OU  -IVFA 7/2021 with MAs, but no NV    3. NSC OD  -Patient wishes to wait at this time   - MRx given today (20/30)     RTC 2 months for OCT Macula and DFE

## 2022-08-31 RX ORDER — LISINOPRIL 40 MG/1
40 TABLET ORAL DAILY
COMMUNITY
Start: 2022-04-04 | End: 2022-09-08 | Stop reason: SDUPTHER

## 2022-09-08 ENCOUNTER — OFFICE VISIT (OUTPATIENT)
Dept: INTERNAL MEDICINE | Facility: CLINIC | Age: 62
End: 2022-09-08

## 2022-09-08 VITALS
SYSTOLIC BLOOD PRESSURE: 140 MMHG | WEIGHT: 152.31 LBS | HEART RATE: 83 BPM | DIASTOLIC BLOOD PRESSURE: 76 MMHG | RESPIRATION RATE: 20 BRPM | TEMPERATURE: 99 F | BODY MASS INDEX: 25.38 KG/M2 | HEIGHT: 65 IN | OXYGEN SATURATION: 100 %

## 2022-09-08 DIAGNOSIS — H25.811 COMBINED FORMS OF AGE-RELATED CATARACT OF RIGHT EYE: ICD-10-CM

## 2022-09-08 DIAGNOSIS — D50.8 IRON DEFICIENCY ANEMIA SECONDARY TO INADEQUATE DIETARY IRON INTAKE: ICD-10-CM

## 2022-09-08 DIAGNOSIS — E55.9 VITAMIN D DEFICIENCY: ICD-10-CM

## 2022-09-08 DIAGNOSIS — E05.90 HYPERTHYROIDISM: ICD-10-CM

## 2022-09-08 DIAGNOSIS — E11.65 UNCONTROLLED TYPE 2 DIABETES MELLITUS WITH HYPERGLYCEMIA: ICD-10-CM

## 2022-09-08 DIAGNOSIS — Z00.00 HEALTHCARE MAINTENANCE: Primary | ICD-10-CM

## 2022-09-08 DIAGNOSIS — I10 PRIMARY HYPERTENSION: ICD-10-CM

## 2022-09-08 DIAGNOSIS — H35.81 MACULAR EDEMA OF LEFT EYE: ICD-10-CM

## 2022-09-08 DIAGNOSIS — E78.2 MIXED HYPERLIPIDEMIA: ICD-10-CM

## 2022-09-08 DIAGNOSIS — E83.52 HYPERCALCEMIA: ICD-10-CM

## 2022-09-08 LAB
APPEARANCE UR: CLEAR
BACTERIA #/AREA URNS AUTO: ABNORMAL /HPF
BILIRUB UR QL STRIP.AUTO: NEGATIVE MG/DL
COLOR UR AUTO: COLORLESS
GLUCOSE UR QL STRIP.AUTO: NORMAL MG/DL
HYALINE CASTS #/AREA URNS LPF: ABNORMAL /LPF
KETONES UR QL STRIP.AUTO: NEGATIVE MG/DL
LEUKOCYTE ESTERASE UR QL STRIP.AUTO: NEGATIVE UNIT/L
NITRITE UR QL STRIP.AUTO: NEGATIVE
PH UR STRIP.AUTO: 5 [PH]
PROT UR QL STRIP.AUTO: ABNORMAL MG/DL
RBC #/AREA URNS AUTO: ABNORMAL /HPF
RBC UR QL AUTO: NEGATIVE UNIT/L
SP GR UR STRIP.AUTO: 1.01
SQUAMOUS #/AREA URNS LPF: ABNORMAL /HPF
UROBILINOGEN UR STRIP-ACNC: NORMAL MG/DL
WBC #/AREA URNS AUTO: ABNORMAL /HPF

## 2022-09-08 PROCEDURE — 81001 URINALYSIS AUTO W/SCOPE: CPT

## 2022-09-08 PROCEDURE — 99214 OFFICE O/P EST MOD 30 MIN: CPT | Mod: PBBFAC

## 2022-09-08 PROCEDURE — 87088 URINE BACTERIA CULTURE: CPT

## 2022-09-08 RX ORDER — AMLODIPINE BESYLATE 10 MG
10 TABLET ORAL DAILY
Qty: 90 TABLET | Refills: 3 | Status: SHIPPED | OUTPATIENT
Start: 2022-09-08 | End: 2022-12-27 | Stop reason: SDUPTHER

## 2022-09-08 RX ORDER — LISINOPRIL 40 MG/1
40 TABLET ORAL DAILY
Qty: 90 TABLET | Refills: 3 | Status: SHIPPED | OUTPATIENT
Start: 2022-09-08 | End: 2022-12-27

## 2022-09-08 RX ORDER — ATORVASTATIN CALCIUM 40 MG/1
40 TABLET, FILM COATED ORAL DAILY
Qty: 90 TABLET | Refills: 4 | Status: SHIPPED | OUTPATIENT
Start: 2022-09-08 | End: 2022-12-27

## 2022-09-08 NOTE — PROGRESS NOTES
I have reviewed and concur with the resident's history, physical, assessment, and plan.  I have discussed with him all issues related to the diagnosis, workup and treatment plan.Care provided as reasonable and necessary.    New Morelos MD  [facility]

## 2022-09-08 NOTE — PROGRESS NOTES
[facility] INTERNAL MEDICINE  OUTPATIENT OFFICE VISIT NOTE    SUBJECTIVE:      Chief Complaint: Follow-up (Pressure during urination , excessive urination)       HPI: Patient is a 61-year-old -American female with past medical history significant for diabetes mellitus type II, hyperthyroidism, hypertension, hyperlipidemia, peripheral neuropathy presents to medicine clinic today for routine follow-up.     TODAY - Patient complains of frequent urination. She states she has tried to increase hydration with associated increase in urination. Reports feeling bladder pressure during urination. Denies dysuria, hematuria. No other complaints. Last seen be Cardiology on 7/29/2022 at which time there were no changes to patient's medication.       Past Medical History:   has a past medical history of Cataract, Diabetic retinopathy, HTN (hypertension), Hyperthyroidism, Mixed hyperlipidemia, and Type 2 diabetes mellitus with unspecified diabetic retinopathy without macular edema.     Past Surgical History:   has a past surgical history that includes Hysterectomy; Breast surgery; Tubal ligation; and Eye surgery.     Family History:  family history includes Dementia in her mother; Hypertension in her mother.     Social History:   reports that she quit smoking about 24 years ago. Her smoking use included cigarettes. She has never used smokeless tobacco. She reports that she does not currently use alcohol. She reports that she does not use drugs.     Allergies:  has No Known Allergies.     Home Medications:  Prior to Admission medications    Medication Sig Start Date End Date Taking? Authorizing Provider   BETIMOL 0.25 % ophthalmic solution Place 1 drop into the left eye 2 (two) times daily. 8/23/22  Yes Historical Provider   cholecalciferol, vitamin D3, (VITAMIN D3) 50 mcg (2,000 unit) Cap capsule Take 2,000 Int'l Units by mouth every other day. 1/26/22 1/21/23 Yes Historical Provider   FEROSUL 325 mg (65 mg iron) Tab tablet Take  "by mouth every other day. 4/1/22  Yes Historical Provider   LEVEMIR FLEXTOUCH U-100 INSULN 100 unit/mL (3 mL) InPn pen Inject 30 Units into the skin every evening. 4/28/22  Yes Historical Provider   propylthiouraciL (PTU) 50 mg Tab Take 100 mg by mouth 2 (two) times daily. 4/28/22  Yes Historical Provider   TECHLITE PEN NEEDLE 32 gauge x 5/32" Ndle USE ONE DAILY 4/28/22  Yes Historical Provider   TRADJENTA 5 mg Tab tablet Take 5 mg by mouth once daily. 4/1/22  Yes Historical Provider   TRUE METRIX GLUCOSE METER Misc use as directed 1/28/22  Yes Historical Provider   TRUE METRIX GLUCOSE TEST STRIP Strp 2 (two) times a day. Use to check blood glucose 4/7/22  Yes Historical Provider   LIPITOR 40 mg tablet Take 40 mg by mouth once daily. 4/28/22 9/8/22 Yes Historical Provider   lisinopriL (PRINIVIL,ZESTRIL) 40 MG tablet Take 40 mg by mouth once daily. 4/4/22 9/8/22 Yes Historical Provider   lisinopriL-hydrochlorothiazide (PRINZIDE,ZESTORETIC) 20-25 mg Tab Take by mouth. 10/5/21 9/8/22 Yes Historical Provider   NORVASC 10 mg tablet Take 10 mg by mouth once daily. 2/28/22 9/8/22 Yes Historical Provider   atorvastatin (LIPITOR) 40 MG tablet Take 1 tablet (40 mg total) by mouth once daily. 9/8/22 12/2/23  Shanon Bhagat MD   ketorolac 0.4% (ACULAR) 0.4 % Drop PLACE 1 DROP IN THE LEFT EYE FOUR TIMES A DAY 2/4/22   Historical Provider   lisinopriL (PRINIVIL,ZESTRIL) 40 MG tablet Take 1 tablet (40 mg total) by mouth once daily. 9/8/22   Shanon Bhagat MD   metFORMIN (GLUCOPHAGE) 1000 MG tablet Take 1,000 mg by mouth 2 (two) times daily. 4/1/22   Historical Provider   NORVASC 10 mg tablet Take 1 tablet (10 mg total) by mouth once daily. 9/8/22   Shanon Bhagat MD   PRED FORTE 1 % DrpS INSTILL 1 DROP IN THE LEFT EYE THREE TIMES A DAY 12/16/21   Historical Provider   atorvastatin (LIPITOR) 40 MG tablet Take 40 mg by mouth. 1/26/22 9/8/22  Historical Provider       ROS:  Constitutional: no fever, fatigue, weakness  Eye: no vision loss, " "eye redness, drainage, or pain  ENMT: no sore throat, ear pain, sinus pain/congestion, nasal congestion/drainage  Respiratory: no cough, no wheezing, no shortness of breath  Cardiovascular: no chest pain, no palpitations, no edema  Gastrointestinal: no nausea, vomiting, or diarrhea. No abdominal pain  Genitourinary: positive for bladder pressure, no dysuria, no urinary frequency or urgency, no hematuria  Hema/Lymph: no abnormal bruising or bleeding  Endocrine: no heat or cold intolerance, no excessive thirst or excessive urination  Musculoskeletal: no muscle or joint pain, no joint swelling  Integumentary: no skin rash or abnormal lesion  Neurologic: positive for occasional right hand tingling which resolves with massage, no headache, no dizziness         OBJECTIVE:     Vital signs:   BP (!) 140/76 (BP Location: Right arm, Patient Position: Sitting, BP Method: Medium (Manual))   Pulse 83   Temp 98.8 °F (37.1 °C) (Oral)   Resp 20   Ht 5' 4.96" (1.65 m)   Wt 69.1 kg (152 lb 5.4 oz)   SpO2 100%   BMI 25.38 kg/m²      Physical Examination:  General: Patient well-appearing, in no distress   Eye: EOMI, clear conjunctiva, eyelids normal  HENT: Head-normocephalic and atraumatic  Neck: full range of motion, trachea midline  Respiratory: clear to auscultation bilaterally without wheezes, rales, rhonchi  Cardiovascular: regular rate without murmurs.  No gallops or rubs   Gastrointestinal: soft, non-tender, non-distended with normal bowel sounds, without masses to palpation  Musculoskeletal: no lower extremity edema  Neurologic: alert and oriented x 3    Diabetic foot exam: DP 2+ bilaterally, no callus, no ulcer, normal monofilament exam    Labs:  CMP:   Lab Results   Component Value Date    GLUCOSE 133 (H) 05/16/2022    CALCIUM 10.5 (H) 05/16/2022    ALBUMIN 4.1 05/16/2022     05/16/2022    K 4.2 05/16/2022    CO2 25 05/16/2022    BUN 18.4 05/16/2022    CREATININE 0.75 05/16/2022    ALKPHOS 60 05/16/2022    ALT 13 " 05/16/2022    AST 13 05/16/2022    BILITOT 0.3 05/16/2022      FLP:   Lab Results   Component Value Date    CHOL 195 12/24/2020    HDL 60 12/24/2020    .00 12/24/2020    TRIG 86 12/24/2020    TOTALCHOLEST 3 12/24/2020     DM:   Lab Results   Component Value Date    HGBA1C 7.0 05/16/2022    .2 05/16/2022    CREATININE 0.75 05/16/2022    CREATRANDUR 162.1 01/27/2022    CREATRANDUR 162.1 01/27/2022    PROTEINURINE 20.9 01/27/2022     Thyroid:   Lab Results   Component Value Date    TSH 1.7583 05/16/2022    UJHKOU3HTPP 0.91 05/16/2022     LFTs:   Lab Results   Component Value Date    LABPROT 8.4 (H) 05/16/2022    ALBUMIN 4.1 05/16/2022    AST 13 05/16/2022    ALT 13 05/16/2022    ALKPHOS 60 05/16/2022     Anemia:   Lab Results   Component Value Date    IRON 49 01/27/2022    TIBC 284 01/27/2022    FERRITIN 69.78 01/27/2022    [id number] 672 12/24/2020         ASSESSMENT & PLAN:   Chary was seen today for follow-up.    Diagnoses and all orders for this visit:      Primary hypertension  - Follow by Cardiology, last seen 7/28/2022: EF 60% with diastolic dysfunction, no changes made to meds  - Previously stopped combo pill since HCTZ might make hypercalcemia worse   - Continue Lisinopril 40 mg daily, Amlodipine 10 mg daily  - Advised to keep a BP log, low salt diet    Macular edema of left eye  Combined forms of age-related cataract of right eye  - Followed by Ophthalmology with last seen 8/26/2022  - Will defer management to Ophthalmology    Mixed hyperlipidemia  - Elevated  in 12/2021  - Advised on low fat/cholesterol diet  - Continue Atorvastatin 40 mg daily  - FLP due 12/2022    Hypercalcemia  Hyperparathyroidism  - Found to have elevated calcium and parathyroid hormone (83 in 1/2022).   - Ca 10.5, , VitD 29.3 in 5/2022  - DEXA 2/2022  The average T score lumbar vertebrae is -1.3 which is within the range of osteopenia. The average T score of the femurs is -0.7 which is within normal  range.  - Advised to hydrate well. and call back if she gets sx.   - Follow up with Endocrinology on 9/15/2022 for possible primary hyperparathyroidism  - CMP, VitD due 5/2023    Iron deficiency anemia secondary to inadequate dietary iron intake  - No signs of active bleeding   - FIT neg 5/2021   - Cologuard pending, orders in since 5/2022  - Continue iron sulfate 325 mg Monday, Wednesday, Friday    Uncontrolled type 2 diabetes mellitus with hyperglycemia  - Last A1c 7.0 in 5/2022, due 12/2022  - Meds: Determir 30 units at bedtime, metformin 1000mg BID, Tradjenta 5 mg daily.  - Previously d/c glimepiride 2mg in 2018. was started on Januvia but she stopped taking on her own because FBG sometimes in 90s. Stopped Tradjenta in 10/2021 on her own because FBG in 80-90s. No episode or sx of hypoglycemia.  - Diabetic foot exam in clinic today, see physical exam   - Diabetic eye exam -follows optho  - Proteinuria screening- elevated UPC/microalbumin of 42.1 in 1/2022  - Asked to measure and keep a log of her blood sugar levels before breakfast and 2 hrs after largest meal  - Advised on following diabetic diet and daily exercise  - Advised patient on the importance of medication compliance.    Vitamin D deficiency  - VitD 29.3 in 5/2022  - Previously completed vitD 50,000 units for 8 weeks  - Continue Vit D3 2000 units daily    Hyperthyroidism  - TSH 1.7583, Free T4 0.91, Free T3 2.85 in 5/2022 - all wnl  - Thyroid uptake scan normal, thyrotropin receptor Ab level WNL, unknown etiology  - Denies any symptoms of thyrotoxicosis  - Compliant with meds  - Continue  mg twice daily  - Check TFTs every 3-6 m, next due 12/2022    Bladder pressure  Frequent urination  - Follow up Urinalysis    Healthcare maintenance  ASCVD risk- 22.8 % . Cont Statin daily.   HM labs (CBC, CMP, FLP, A1c, TSH, vitD): due by next visit  HIV/Hep panel/syphilis: negative 5/2022  Pneumococcal vaccine (due at 65 unless special situations): at  65  Tdap: refused  Zoster vaccine: refused  Flu: refused, will get vaccination at health clinic  FIT/colonoscopy: Cologuard due now, pending collection   Lung cancer screening (LDCT age 50-80): NA  AAA screening (men, age 65-75): NA  Mammogram (after 40): 2/2022 BIRADS 1/2; due 2/2023  DEXA scan: 2/2022 with osteopenia, rpt in 2-3 yrs   GYN (pap smears): had hysterectomy in past due to menorrhagia      Return to clinic in 3 month(s).      Shanon Bhagat MD  [facility] Medicine, -  9/8/2022

## 2022-09-10 LAB — BACTERIA UR CULT: NO GROWTH

## 2022-09-14 ENCOUNTER — TELEPHONE (OUTPATIENT)
Dept: INTERNAL MEDICINE | Facility: CLINIC | Age: 62
End: 2022-09-14

## 2022-09-15 ENCOUNTER — OFFICE VISIT (OUTPATIENT)
Dept: ENDOCRINOLOGY | Facility: CLINIC | Age: 62
End: 2022-09-15

## 2022-09-15 VITALS
RESPIRATION RATE: 20 BRPM | HEART RATE: 77 BPM | TEMPERATURE: 99 F | BODY MASS INDEX: 25.58 KG/M2 | WEIGHT: 153.5 LBS | DIASTOLIC BLOOD PRESSURE: 67 MMHG | SYSTOLIC BLOOD PRESSURE: 158 MMHG | HEIGHT: 65 IN

## 2022-09-15 DIAGNOSIS — E83.52 HYPERCALCEMIA: ICD-10-CM

## 2022-09-15 DIAGNOSIS — E21.3 HYPERPARATHYROIDISM: Primary | ICD-10-CM

## 2022-09-15 DIAGNOSIS — E55.9 VITAMIN D DEFICIENCY: ICD-10-CM

## 2022-09-15 LAB
ALBUMIN SERPL-MCNC: 4.2 GM/DL (ref 3.4–4.8)
BUN SERPL-MCNC: 17.8 MG/DL (ref 9.8–20.1)
CALCIUM SERPL-MCNC: 10.3 MG/DL (ref 8.4–10.2)
CHLORIDE SERPL-SCNC: 102 MMOL/L (ref 98–107)
CO2 SERPL-SCNC: 25 MMOL/L (ref 23–31)
CREAT SERPL-MCNC: 0.84 MG/DL (ref 0.55–1.02)
DEPRECATED CALCIDIOL+CALCIFEROL SERPL-MC: 27 NG/ML (ref 30–80)
GFR SERPLBLD CREATININE-BSD FMLA CKD-EPI: >60 MLS/MIN/1.73/M2
GLUCOSE SERPL-MCNC: 107 MG/DL (ref 82–115)
MAGNESIUM SERPL-MCNC: 1.6 MG/DL (ref 1.6–2.6)
PHOSPHATE SERPL-MCNC: 3 MG/DL (ref 2.3–4.7)
POTASSIUM SERPL-SCNC: 4.3 MMOL/L (ref 3.5–5.1)
PTH-INTACT SERPL-MCNC: 121.3 PG/ML (ref 8.7–77)
SODIUM SERPL-SCNC: 137 MMOL/L (ref 136–145)

## 2022-09-15 PROCEDURE — 83735 ASSAY OF MAGNESIUM: CPT | Performed by: NURSE PRACTITIONER

## 2022-09-15 PROCEDURE — 36415 COLL VENOUS BLD VENIPUNCTURE: CPT | Performed by: NURSE PRACTITIONER

## 2022-09-15 PROCEDURE — 82306 VITAMIN D 25 HYDROXY: CPT | Performed by: NURSE PRACTITIONER

## 2022-09-15 PROCEDURE — 99215 OFFICE O/P EST HI 40 MIN: CPT | Mod: PBBFAC | Performed by: NURSE PRACTITIONER

## 2022-09-15 PROCEDURE — 99203 PR OFFICE/OUTPT VISIT, NEW, LEVL III, 30-44 MIN: ICD-10-PCS | Mod: S$PBB,,, | Performed by: NURSE PRACTITIONER

## 2022-09-15 PROCEDURE — 80069 RENAL FUNCTION PANEL: CPT | Performed by: NURSE PRACTITIONER

## 2022-09-15 PROCEDURE — 99203 OFFICE O/P NEW LOW 30 MIN: CPT | Mod: S$PBB,,, | Performed by: NURSE PRACTITIONER

## 2022-09-15 PROCEDURE — 83970 ASSAY OF PARATHORMONE: CPT | Performed by: NURSE PRACTITIONER

## 2022-09-15 RX ORDER — LINAGLIPTIN 5 MG/1
5 TABLET, FILM COATED ORAL DAILY
Qty: 30 TABLET | Refills: 3 | Status: SHIPPED | OUTPATIENT
Start: 2022-09-15 | End: 2022-12-27

## 2022-09-15 NOTE — TELEPHONE ENCOUNTER
Pt called, name and  verified. Pt informed of results . Pt verbalized 100% understanding. No further questions or concerns noted. Call ended.

## 2022-09-15 NOTE — TELEPHONE ENCOUNTER
----- Message from Fartun Zarco sent at 9/15/2022 12:38 PM CDT -----  Regarding: med refill/alen  Pt needs refills on tradjenta [facility]

## 2022-09-15 NOTE — TELEPHONE ENCOUNTER
Please inform patient that her Urinalysis was negative, indicating no UTI so no need for antibiotics.

## 2022-09-19 ENCOUNTER — TELEPHONE (OUTPATIENT)
Dept: ENDOCRINOLOGY | Facility: CLINIC | Age: 62
End: 2022-09-19

## 2022-09-19 NOTE — TELEPHONE ENCOUNTER
----- Message from Aaliyah Cota sent at 9/19/2022  9:04 AM CDT -----  Regarding: Pt call  #FELTON#      /Pt returned call she can be reached at [phone]

## 2022-09-21 ENCOUNTER — HISTORICAL (OUTPATIENT)
Dept: ADMINISTRATIVE | Facility: HOSPITAL | Age: 62
End: 2022-09-21

## 2022-12-21 ENCOUNTER — LAB VISIT (OUTPATIENT)
Dept: LAB | Facility: HOSPITAL | Age: 62
End: 2022-12-21
Attending: STUDENT IN AN ORGANIZED HEALTH CARE EDUCATION/TRAINING PROGRAM

## 2022-12-21 DIAGNOSIS — D50.8 IRON DEFICIENCY ANEMIA SECONDARY TO INADEQUATE DIETARY IRON INTAKE: ICD-10-CM

## 2022-12-21 DIAGNOSIS — E78.2 MIXED HYPERLIPIDEMIA: ICD-10-CM

## 2022-12-21 DIAGNOSIS — E05.90 HYPERTHYROIDISM: ICD-10-CM

## 2022-12-21 LAB
CHOLEST SERPL-MCNC: 137 MG/DL
CHOLEST/HDLC SERPL: 3 {RATIO} (ref 0–5)
EST. AVERAGE GLUCOSE BLD GHB EST-MCNC: 180 MG/DL
HBA1C MFR BLD: 7.9 %
HDLC SERPL-MCNC: 50 MG/DL (ref 35–60)
LDLC SERPL CALC-MCNC: 70 MG/DL (ref 50–140)
T3FREE SERPL-MCNC: 4.25 PG/ML (ref 1.57–3.91)
T4 FREE SERPL-MCNC: 0.93 NG/DL (ref 0.7–1.48)
TRIGL SERPL-MCNC: 85 MG/DL (ref 37–140)
TSH SERPL-ACNC: 3.32 UIU/ML (ref 0.35–4.94)
VLDLC SERPL CALC-MCNC: 17 MG/DL

## 2022-12-21 PROCEDURE — 36415 COLL VENOUS BLD VENIPUNCTURE: CPT

## 2022-12-21 PROCEDURE — 83036 HEMOGLOBIN GLYCOSYLATED A1C: CPT

## 2022-12-21 PROCEDURE — 84481 FREE ASSAY (FT-3): CPT

## 2022-12-21 PROCEDURE — 84443 ASSAY THYROID STIM HORMONE: CPT

## 2022-12-21 PROCEDURE — 84439 ASSAY OF FREE THYROXINE: CPT

## 2022-12-21 PROCEDURE — 80061 LIPID PANEL: CPT

## 2022-12-23 DIAGNOSIS — R94.6 ABNORMAL THYROID FUNCTION TEST: Primary | ICD-10-CM

## 2022-12-27 ENCOUNTER — OFFICE VISIT (OUTPATIENT)
Dept: INTERNAL MEDICINE | Facility: CLINIC | Age: 62
End: 2022-12-27

## 2022-12-27 VITALS
OXYGEN SATURATION: 96 % | SYSTOLIC BLOOD PRESSURE: 142 MMHG | BODY MASS INDEX: 26.63 KG/M2 | DIASTOLIC BLOOD PRESSURE: 74 MMHG | RESPIRATION RATE: 18 BRPM | HEIGHT: 64 IN | WEIGHT: 156 LBS | TEMPERATURE: 99 F | HEART RATE: 98 BPM

## 2022-12-27 DIAGNOSIS — E11.9 TYPE 2 DIABETES MELLITUS WITHOUT COMPLICATION, WITH LONG-TERM CURRENT USE OF INSULIN: ICD-10-CM

## 2022-12-27 DIAGNOSIS — Z12.11 ENCOUNTER FOR COLORECTAL CANCER SCREENING: ICD-10-CM

## 2022-12-27 DIAGNOSIS — E05.90 HYPERTHYROIDISM: ICD-10-CM

## 2022-12-27 DIAGNOSIS — I10 HYPERTENSION, UNSPECIFIED TYPE: Primary | ICD-10-CM

## 2022-12-27 DIAGNOSIS — Z12.31 SCREENING MAMMOGRAM FOR HIGH-RISK PATIENT: ICD-10-CM

## 2022-12-27 DIAGNOSIS — E78.5 HYPERLIPIDEMIA, UNSPECIFIED HYPERLIPIDEMIA TYPE: ICD-10-CM

## 2022-12-27 DIAGNOSIS — Z12.12 ENCOUNTER FOR COLORECTAL CANCER SCREENING: ICD-10-CM

## 2022-12-27 DIAGNOSIS — Z79.4 TYPE 2 DIABETES MELLITUS WITHOUT COMPLICATION, WITH LONG-TERM CURRENT USE OF INSULIN: ICD-10-CM

## 2022-12-27 DIAGNOSIS — Z12.39 ENCOUNTER FOR BREAST CANCER SCREENING OTHER THAN MAMMOGRAM: ICD-10-CM

## 2022-12-27 PROCEDURE — 99215 OFFICE O/P EST HI 40 MIN: CPT | Mod: PBBFAC

## 2022-12-27 RX ORDER — AMLODIPINE BESYLATE 10 MG
10 TABLET ORAL DAILY
Qty: 90 TABLET | Refills: 3 | Status: SHIPPED | OUTPATIENT
Start: 2022-12-27 | End: 2023-05-15 | Stop reason: SDUPTHER

## 2022-12-27 RX ORDER — LINAGLIPTIN 5 MG/1
5 TABLET, FILM COATED ORAL DAILY
Qty: 90 TABLET | Refills: 3 | Status: SHIPPED | OUTPATIENT
Start: 2022-12-27 | End: 2023-05-15 | Stop reason: SDUPTHER

## 2022-12-27 RX ORDER — METFORMIN HYDROCHLORIDE 1000 MG/1
1000 TABLET ORAL 2 TIMES DAILY
Qty: 90 TABLET | Refills: 3 | Status: SHIPPED | OUTPATIENT
Start: 2022-12-27 | End: 2023-05-15 | Stop reason: SDUPTHER

## 2022-12-27 RX ORDER — ATORVASTATIN CALCIUM 40 MG/1
40 TABLET, FILM COATED ORAL DAILY
Qty: 90 TABLET | Refills: 4 | Status: SHIPPED | OUTPATIENT
Start: 2022-12-27 | End: 2023-05-15 | Stop reason: SDUPTHER

## 2022-12-27 RX ORDER — METOPROLOL SUCCINATE 25 MG/1
25 TABLET, EXTENDED RELEASE ORAL DAILY
Qty: 30 TABLET | Refills: 11 | Status: SHIPPED | OUTPATIENT
Start: 2022-12-27 | End: 2023-05-15 | Stop reason: SDUPTHER

## 2022-12-27 RX ORDER — LISINOPRIL 40 MG/1
40 TABLET ORAL DAILY
Qty: 90 TABLET | Refills: 3 | Status: SHIPPED | OUTPATIENT
Start: 2022-12-27 | End: 2023-05-15 | Stop reason: SDUPTHER

## 2022-12-27 NOTE — PROGRESS NOTES
"[facility] INTERNAL MEDICINE  OUTPATIENT OFFICE VISIT NOTE    SUBJECTIVE:      HPI: Chary Min is a 62 y.o. yo female w/ PMH of hypertension, hyperthyroidism, hyperlipidemia and vitamin D deficiency who presents today for routine follow up. Today, she complains of wheezing that started two months ago. Noticed it at night intermittently but has since decreased in occurrence. Never had wheezing in the past. Of note, she has no wheezing during the day. Has a previous hx of smoking but quit more than 30 years ago. Has never used inhalers in the past. Is known to have a heart murmur but was evaluated by Cardiology and nothing was found on echocardiogram. No chest pain, shortness of breath, cough, fever or chills, leg swelling.  Of note, patient endorses snoring for the two years. Denies any known apnea episodes.  Finally, patient states she is compliant with her diabetes medications. Endorses blood sugars ranging form  , but typically 110. However, she only checks once a day.      ROS:  (+) wheezing  (-) Chest pain, palpitations, SOB, fever, night sweats, chills, diarrhea, constipation.       OBJECTIVE:     Vital signs:   BP (!) 142/74   Pulse 98   Temp 98.5 °F (36.9 °C)   Resp 18   Ht 5' 4" (1.626 m)   Wt 70.8 kg (156 lb)   SpO2 96%   BMI 26.78 kg/m²      Physical Examination:  General: Well nourished w/o distress  HEENT: NC/AT; PERRLA; nasal and oral mucosa moist and clear; no sinus tenderness; no thyromegaly  Neck: Full ROM; no lymphadenopathy  Pulm: CTA bilaterally, normal work of breathing  CV: S1, S2 w/o murmurs or gallops; no edema noted  GI: Soft with normal bowel sounds in all quadrants, no masses on palpation  MSK: Full ROM of all extremities and spine w/o limitation or discomfort  Derm: No rashes, abnormal bruising, or skin lesions  Neuro: AAOx4; CN II-XII intact; motor/sensory function intact  Psych: Cooperative; appropriate mood and affect         ASSESSMENT & PLAN:     Primary hypertension  - " Follow by Cardiology, last seen 7/28/2022: EF 60% with diastolic dysfunction, no changes made to meds  - Previously stopped combo pill since HCTZ might make hypercalcemia worse   - Continue Lisinopril 40 mg daily, Amlodipine 10 mg daily. Will add metoprolol succinate 25 mg.  - Advised to keep a BP log, low salt diet     Macular edema of left eye  Combined forms of age-related cataract of right eye  - Followed by Ophthalmology with last seen 8/26/2022  - Will defer management to Ophthalmology     Mixed hyperlipidemia  - Elevated  in 12/2021  - Advised on low fat/cholesterol diet  - Continue Atorvastatin 40 mg daily  - FLP on 12/2022 wnl.     Hypercalcemia  Hyperparathyroidism  - Found to have elevated calcium and parathyroid hormone (83 in 1/2022).   - Ca 10.5, , VitD 29.3 in 5/2022  - DEXA 2/2022  The average T score lumbar vertebrae is -1.3 which is within the range of osteopenia. The average T score of the femurs is -0.7 which is within normal range.  - Advised to hydrate well. and call back if she gets sx.   - Followed by [facility] Endocrinology. Will defer management     Iron deficiency anemia secondary to inadequate dietary iron intake  - No signs of active bleeding   - FIT neg 5/2021   - Was ordered cologuard but cannot afford it. Will order FIT test.  - Continue iron sulfate 325 mg Monday, Wednesday, Friday     Uncontrolled type 2 diabetes mellitus with hyperglycemia  - A1c today is 7.9 in 12/2022, However, blood sugars at home average around 110.  - Meds: Determir 30 units at bedtime, metformin 1000mg BID, Tradjenta 5 mg daily.  - Concerned that her blood sugars are actually elevated but she is not catching them. Sent her home with a blood sugar log and will have her follow up in 2 weeks.  - Previously d/c glimepiride 2mg in 2018. was started on Januvia but she stopped taking on her own because FBG sometimes in 90s. Stopped Tradjenta in 10/2021 on her own because FBG in 80-90s. No episode or sx of  hypoglycemia.  - Diabetic foot exam in clinic today, see physical exam   - Diabetic eye exam -follows optho  - Proteinuria screening- elevated UPC/microalbumin of 42.1 in 1/2022  - Asked to measure and keep a log of her blood sugar levels before breakfast and 2 hrs after largest meal  - Advised on following diabetic diet and daily exercise  - Advised patient on the importance of medication compliance.     Vitamin D deficiency  - VitD 29.3 in 5/2022  - Previously completed vitD 50,000 units for 8 weeks  - Continue Vit D3 2000 units daily  -will recheck values before next visit.     Hyperthyroidism  - TSH 1.7583, Free T4 0.91, Free T3 2.85 in 5/2022 - all wnl  - Thyroid uptake scan normal, thyrotropin receptor Ab level WNL, unknown etiology  - Denies any symptoms of thyrotoxicosis  - Compliant with meds  - Continue  mg twice daily  - Followed by [facility] Endocrinology. Will defer management.        Healthcare maintenance  ASCVD risk- 22.8 % . Cont Statin daily.    labs (CBC, CMP, FLP, A1c, TSH, vitD): Will repeat labs before next time  HIV/Hep panel/syphilis: negative 5/2022  Pneumococcal vaccine (due at 65 unless special situations): at 65  Tdap: refused  Zoster vaccine: refused  Flu: refused, will get vaccination at health clinic  FIT/colonoscopy: FIT test today.   Lung cancer screening (LDCT age 50-80): NA  AAA screening (men, age 65-75): NA  Mammogram (after 40): 2/2022 BIRADS 1/2; due 2/2023. Will scheudle now.  DEXA scan: 2/2022 with osteopenia, rpt in 2-3 yrs   GYN (pap smears): had hysterectomy in past due to menorrhagia       Return to clinic in 5 months.     Be Funk MD  [facility] Internal Medicine, PGY-3

## 2022-12-29 ENCOUNTER — OFFICE VISIT (OUTPATIENT)
Dept: OPHTHALMOLOGY | Facility: CLINIC | Age: 62
End: 2022-12-29

## 2022-12-29 VITALS — BODY MASS INDEX: 26.64 KG/M2 | HEIGHT: 64 IN | WEIGHT: 156.06 LBS

## 2022-12-29 DIAGNOSIS — E11.3413 SEVERE NONPROLIFERATIVE DIABETIC RETINOPATHY OF BOTH EYES WITH MACULAR EDEMA ASSOCIATED WITH TYPE 2 DIABETES MELLITUS: Primary | ICD-10-CM

## 2022-12-29 PROCEDURE — 92134 CPTRZ OPH DX IMG PST SGM RTA: CPT | Mod: PBBFAC,PO | Performed by: STUDENT IN AN ORGANIZED HEALTH CARE EDUCATION/TRAINING PROGRAM

## 2022-12-29 PROCEDURE — 92250 FUNDUS PHOTOGRAPHY W/I&R: CPT | Mod: PBBFAC,PO | Performed by: OPHTHALMOLOGY

## 2022-12-29 PROCEDURE — 92134 CPTRZ OPH DX IMG PST SGM RTA: CPT | Mod: PBBFAC,PO | Performed by: OPHTHALMOLOGY

## 2022-12-29 PROCEDURE — 99213 OFFICE O/P EST LOW 20 MIN: CPT | Mod: PBBFAC,PO | Performed by: STUDENT IN AN ORGANIZED HEALTH CARE EDUCATION/TRAINING PROGRAM

## 2022-12-29 NOTE — PROGRESS NOTES
HPI     Severe nonproliferative diabetic retinopathy of both eyes     Additional comments: DFE, OCT            Blurred Vision     Additional comments: Patient states she noticed vision OD becoming blurry   distance and near           Comments    Severe nonproliferative diabetic retinopathy of both eyes          Last edited by Susie Powers MA on 12/29/2022  7:52 AM.      Assessment /Plan     For exam results, see Encounter Report.    Severe nonproliferative diabetic retinopathy of both eyes with macular edema associated with type 2 diabetes mellitus        OCT mac   05/26/22:  OD: normal foveal contour  OS: hard exudates, edema inferotemporally  05/04/22  OD: 227, NFC, no IRF/SRF  OS: 259, NFC, trace IRF, improved  08/26/22  OD: 229, normal foveal contour  OS: 252, trace IRF, stable since May 2022 but improved significantly since 2/2022 12/29/22   OD: 219, NFC no srf/irf, ez intact   OS: 238, PFC, minimal nonCI irf     1. PSK OS  - s/p CEIOL OS 12/1/2020 w IG vs DME  - OCT mac stable  - D/W Dr. Richmond 2/4/22: IVFA consistent with DME more than IG, OCT mac is stable and vision is preserved. Does not recommend avastin at this time unless vision acutely worsens.  - Edema stable off drops, likely NVS. Patient still on timolol OU  12/29/22: minimal residual fluid but stable     2. Severe NPDR OU  -IVFA 7/2021 with MAs, but no NV  Hemoglobin A1c   Date Value Ref Range Status   12/21/2022 7.9 (H) <=7.0 % Final   05/16/2022 7.0 <=7.0 % Final   01/27/2022 8.5 <=7.0    12/29/22-  Doing well; few DBH ou. Today appears moderate rather than severe. Ctm.   Fundus photos today  Discussed bp/bg management for this stage of retinopathy    3. NSC OD  -Patient wishes to wait at this time   -poor dilation  -MRx given prior visit    4.  C/D 0.5 OU  Will get OCT rnfl on return to check for glaucoma.     RTC 6 month DFE, cat eval OD, OCTrnfl

## 2023-01-02 PROCEDURE — 82274 ASSAY TEST FOR BLOOD FECAL: CPT | Performed by: STUDENT IN AN ORGANIZED HEALTH CARE EDUCATION/TRAINING PROGRAM

## 2023-01-03 ENCOUNTER — LAB VISIT (OUTPATIENT)
Dept: LAB | Facility: HOSPITAL | Age: 63
End: 2023-01-03
Attending: NURSE PRACTITIONER

## 2023-01-03 DIAGNOSIS — Z79.4 TYPE 2 DIABETES MELLITUS WITHOUT COMPLICATION, WITH LONG-TERM CURRENT USE OF INSULIN: ICD-10-CM

## 2023-01-03 DIAGNOSIS — R94.6 ABNORMAL THYROID FUNCTION TEST: ICD-10-CM

## 2023-01-03 DIAGNOSIS — I10 HYPERTENSION, UNSPECIFIED TYPE: ICD-10-CM

## 2023-01-03 DIAGNOSIS — E11.9 TYPE 2 DIABETES MELLITUS WITHOUT COMPLICATION, WITH LONG-TERM CURRENT USE OF INSULIN: ICD-10-CM

## 2023-01-03 LAB
APPEARANCE UR: CLEAR
BACTERIA #/AREA URNS AUTO: ABNORMAL /HPF
BASOPHILS # BLD AUTO: 0.03 X10(3)/MCL (ref 0–0.2)
BASOPHILS NFR BLD AUTO: 0.4 %
BILIRUB UR QL STRIP.AUTO: NEGATIVE MG/DL
COLOR UR AUTO: YELLOW
EOSINOPHIL # BLD AUTO: 0.07 X10(3)/MCL (ref 0–0.9)
EOSINOPHIL NFR BLD AUTO: 0.8 %
ERYTHROCYTE [DISTWIDTH] IN BLOOD BY AUTOMATED COUNT: 13.2 % (ref 11–14.5)
EST. AVERAGE GLUCOSE BLD GHB EST-MCNC: 177.2 MG/DL
GLUCOSE UR QL STRIP.AUTO: ABNORMAL MG/DL
HBA1C MFR BLD: 7.8 %
HCT VFR BLD AUTO: 37.8 % (ref 37–47)
HGB BLD-MCNC: 12.1 GM/DL (ref 12–16)
HYALINE CASTS #/AREA URNS LPF: ABNORMAL /LPF
IMM GRANULOCYTES # BLD AUTO: 0.03 X10(3)/MCL (ref 0–0.04)
IMM GRANULOCYTES NFR BLD AUTO: 0.4 %
KETONES UR QL STRIP.AUTO: ABNORMAL MG/DL
LEUKOCYTE ESTERASE UR QL STRIP.AUTO: 25 UNIT/L
LYMPHOCYTES # BLD AUTO: 3.03 X10(3)/MCL (ref 0.6–4.6)
LYMPHOCYTES NFR BLD AUTO: 36.5 %
MCH RBC QN AUTO: 26.8 PG
MCHC RBC AUTO-ENTMCNC: 32 MG/DL (ref 33–36)
MCV RBC AUTO: 83.8 FL (ref 80–94)
MONOCYTES # BLD AUTO: 0.68 X10(3)/MCL (ref 0.1–1.3)
MONOCYTES NFR BLD AUTO: 8.2 %
MUCOUS THREADS URNS QL MICRO: ABNORMAL /LPF
NEUTROPHILS # BLD AUTO: 4.46 X10(3)/MCL (ref 2.1–9.2)
NEUTROPHILS NFR BLD AUTO: 53.7 %
NITRITE UR QL STRIP.AUTO: NEGATIVE
NRBC BLD AUTO-RTO: 0 % (ref 0–1)
PH UR STRIP.AUTO: 5.5 [PH]
PLATELET # BLD AUTO: 277 X10(3)/MCL (ref 140–371)
PLATELET # BLD EST: ADEQUATE 10*3/UL
PMV BLD AUTO: 11.9 FL (ref 9.4–12.4)
PROT UR QL STRIP.AUTO: ABNORMAL MG/DL
RBC # BLD AUTO: 4.51 X10(6)/MCL (ref 4.2–5.4)
RBC #/AREA URNS AUTO: ABNORMAL /HPF
RBC MORPH BLD: NORMAL
RBC UR QL AUTO: NEGATIVE UNIT/L
SP GR UR STRIP.AUTO: 1.03
SQUAMOUS #/AREA URNS LPF: ABNORMAL /HPF
T3FREE SERPL-MCNC: 3.01 PG/ML (ref 1.57–3.91)
TSH SERPL-ACNC: 4.27 UIU/ML (ref 0.35–4.94)
UROBILINOGEN UR STRIP-ACNC: ABNORMAL MG/DL
WBC # SPEC AUTO: 8.3 X10(3)/MCL (ref 4.5–11.5)
WBC #/AREA URNS AUTO: ABNORMAL /HPF

## 2023-01-03 PROCEDURE — 84445 ASSAY OF TSI GLOBULIN: CPT

## 2023-01-03 PROCEDURE — 83520 IMMUNOASSAY QUANT NOS NONAB: CPT

## 2023-01-03 PROCEDURE — 84443 ASSAY THYROID STIM HORMONE: CPT

## 2023-01-03 PROCEDURE — 83036 HEMOGLOBIN GLYCOSYLATED A1C: CPT

## 2023-01-03 PROCEDURE — 86376 MICROSOMAL ANTIBODY EACH: CPT

## 2023-01-03 PROCEDURE — 84481 FREE ASSAY (FT-3): CPT

## 2023-01-03 PROCEDURE — 85025 COMPLETE CBC W/AUTO DIFF WBC: CPT

## 2023-01-03 PROCEDURE — 36415 COLL VENOUS BLD VENIPUNCTURE: CPT

## 2023-01-04 RX ORDER — TIMOLOL 2.56 MG/ML
1 SOLUTION/ DROPS OPHTHALMIC 2 TIMES DAILY
Qty: 5 ML | Refills: 11 | Status: SHIPPED | OUTPATIENT
Start: 2023-01-04 | End: 2023-01-05 | Stop reason: ALTCHOICE

## 2023-01-04 RX ORDER — TIMOLOL 2.56 MG/ML
1 SOLUTION/ DROPS OPHTHALMIC 2 TIMES DAILY
Status: CANCELLED | OUTPATIENT
Start: 2023-01-04

## 2023-01-04 NOTE — TELEPHONE ENCOUNTER
----- Message from Naa Rodriguez sent at 1/4/2023  7:18 AM CST -----  Regarding: refill on drops   Pt needs to refill her drops yellow top however [facility] has them for 127.00 she wants to know is there something cheaper she can use   [phone]

## 2023-01-04 NOTE — TELEPHONE ENCOUNTER
----- Message from Rose Marie Mattson sent at 1/4/2023 10:48 AM CST -----  Regarding: Please call eye drop script  into different pharmacy  Contact: [phone]  Patient has called twice and asked if  Can call her eye drops into [facility] instead of [facility] please. If someone has can, please call patient when drops are called in.  Ty

## 2023-01-05 LAB
MAYO GENERIC ORDERABLE RESULT: NORMAL
TSH RECEP AB SER-ACNC: <1.1 IU/L (ref 0–1.75)

## 2023-01-05 NOTE — TELEPHONE ENCOUNTER
----- Message from Rose Marie Mattson sent at 1/5/2023  8:21 AM CST -----  Regarding: Patient called for 4th time  Contact: [phone]  Ms. [place] just called and stated that she never received a call yet and wanted to know if someone called in her script for eyedrops yet. She also wanted to know if they could possibly be changed to a less expensive type.  If she misses call, please leave message.  TY

## 2023-01-06 LAB
THYROID PEROXIDASE (OLG): NEGATIVE
THYROID PEROXIDASE QUANT (OLG): <4 IU/ML

## 2023-01-06 NOTE — PROGRESS NOTES
1/9/2023  Assessment /Plan     For exam results, see Encounter Report.    There are no diagnoses linked to this encounter.    Patient with CEIOL OS 12/1/2020 with

## 2023-01-09 LAB — TSI SER-ACNC: <1 TSI INDEX

## 2023-02-23 RX ORDER — INSULIN DETEMIR 100 [IU]/ML
30 INJECTION, SOLUTION SUBCUTANEOUS NIGHTLY
Qty: 9 ML | Refills: 11 | Status: SHIPPED | OUTPATIENT
Start: 2023-02-23 | End: 2023-05-15 | Stop reason: SDUPTHER

## 2023-02-23 RX ORDER — CALCIUM CITRATE/VITAMIN D3 200MG-6.25
1 TABLET ORAL 2 TIMES DAILY
Qty: 200 EACH | Refills: 2 | Status: SHIPPED | OUTPATIENT
Start: 2023-02-23 | End: 2023-05-15 | Stop reason: SDUPTHER

## 2023-02-23 RX ORDER — PROPYLTHIOURACIL 50 MG/1
100 TABLET ORAL 2 TIMES DAILY
Qty: 120 TABLET | Refills: 11 | Status: SHIPPED | OUTPATIENT
Start: 2023-02-23 | End: 2023-03-15

## 2023-02-23 RX ORDER — FERROUS SULFATE TAB 325 MG (65 MG ELEMENTAL FE) 325 (65 FE) MG
325 TAB ORAL EVERY OTHER DAY
Qty: 45 TABLET | Refills: 3 | Status: SHIPPED | OUTPATIENT
Start: 2023-02-23 | End: 2023-08-07 | Stop reason: SDUPTHER

## 2023-02-23 NOTE — TELEPHONE ENCOUNTER
Pt called back in regards to her medications. Pt stated that she only has 2 days left of her insulin

## 2023-02-28 ENCOUNTER — TELEPHONE (OUTPATIENT)
Dept: ADMINISTRATIVE | Facility: HOSPITAL | Age: 63
End: 2023-02-28

## 2023-02-28 NOTE — TELEPHONE ENCOUNTER
Pt called, name and  verified. Pt reported the pharmacist informed her to call the nurse to give her directions on how to administrate her Levemir via her flex pen d/t they didn't have time to demonstrate. Instructions given to pt on how to dial up her units and prime her syringe. Pt repeat instructions and reported it word. Verbalized 100% understanding. No further questions or concerns noted. Call ended.

## 2023-03-15 ENCOUNTER — OFFICE VISIT (OUTPATIENT)
Dept: ENDOCRINOLOGY | Facility: CLINIC | Age: 63
End: 2023-03-15

## 2023-03-15 ENCOUNTER — OFFICE VISIT (OUTPATIENT)
Dept: OPHTHALMOLOGY | Facility: CLINIC | Age: 63
End: 2023-03-15

## 2023-03-15 VITALS
RESPIRATION RATE: 20 BRPM | HEIGHT: 64 IN | BODY MASS INDEX: 25.93 KG/M2 | HEIGHT: 64 IN | BODY MASS INDEX: 25.78 KG/M2 | DIASTOLIC BLOOD PRESSURE: 78 MMHG | TEMPERATURE: 98 F | WEIGHT: 151.88 LBS | SYSTOLIC BLOOD PRESSURE: 138 MMHG | WEIGHT: 151 LBS | HEART RATE: 78 BPM

## 2023-03-15 DIAGNOSIS — E11.3413 SEVERE NONPROLIFERATIVE DIABETIC RETINOPATHY OF BOTH EYES WITH MACULAR EDEMA ASSOCIATED WITH TYPE 2 DIABETES MELLITUS: Primary | ICD-10-CM

## 2023-03-15 DIAGNOSIS — E55.9 VITAMIN D DEFICIENCY: ICD-10-CM

## 2023-03-15 DIAGNOSIS — E83.52 HYPERCALCEMIA: ICD-10-CM

## 2023-03-15 DIAGNOSIS — E05.90 HYPERTHYROIDISM: ICD-10-CM

## 2023-03-15 DIAGNOSIS — E21.3 HYPERPARATHYROIDISM: Primary | ICD-10-CM

## 2023-03-15 DIAGNOSIS — H40.003 GLAUCOMA SUSPECT OF BOTH EYES: ICD-10-CM

## 2023-03-15 LAB
ALBUMIN SERPL-MCNC: 4 G/DL (ref 3.4–4.8)
BUN SERPL-MCNC: 16.6 MG/DL (ref 9.8–20.1)
CALCIUM SERPL-MCNC: 10.6 MG/DL (ref 8.4–10.2)
CHLORIDE SERPL-SCNC: 104 MMOL/L (ref 98–107)
CO2 SERPL-SCNC: 24 MMOL/L (ref 23–31)
CREAT SERPL-MCNC: 0.73 MG/DL (ref 0.55–1.02)
DEPRECATED CALCIDIOL+CALCIFEROL SERPL-MC: 29.5 NG/ML (ref 30–80)
GFR SERPLBLD CREATININE-BSD FMLA CKD-EPI: >60 MLS/MIN/1.73/M2
GLUCOSE SERPL-MCNC: 125 MG/DL (ref 82–115)
PHOSPHATE SERPL-MCNC: 3.4 MG/DL (ref 2.3–4.7)
POTASSIUM SERPL-SCNC: 4.5 MMOL/L (ref 3.5–5.1)
PTH-INTACT SERPL-MCNC: 87.5 PG/ML (ref 8.7–77)
SODIUM SERPL-SCNC: 138 MMOL/L (ref 136–145)
T3FREE SERPL-MCNC: 2.79 PG/ML (ref 1.57–3.91)
T4 FREE SERPL-MCNC: 0.81 NG/DL (ref 0.7–1.48)
TSH SERPL-ACNC: 3.88 UIU/ML (ref 0.35–4.94)

## 2023-03-15 PROCEDURE — 82306 VITAMIN D 25 HYDROXY: CPT | Performed by: NURSE PRACTITIONER

## 2023-03-15 PROCEDURE — 99215 OFFICE O/P EST HI 40 MIN: CPT | Mod: PBBFAC | Performed by: NURSE PRACTITIONER

## 2023-03-15 PROCEDURE — 84443 ASSAY THYROID STIM HORMONE: CPT | Performed by: NURSE PRACTITIONER

## 2023-03-15 PROCEDURE — 83970 ASSAY OF PARATHORMONE: CPT | Performed by: NURSE PRACTITIONER

## 2023-03-15 PROCEDURE — 84481 FREE ASSAY (FT-3): CPT | Performed by: NURSE PRACTITIONER

## 2023-03-15 PROCEDURE — 92134 CPTRZ OPH DX IMG PST SGM RTA: CPT | Mod: PBBFAC,PO | Performed by: OPHTHALMOLOGY

## 2023-03-15 PROCEDURE — 99214 OFFICE O/P EST MOD 30 MIN: CPT | Mod: S$PBB,,, | Performed by: NURSE PRACTITIONER

## 2023-03-15 PROCEDURE — 84439 ASSAY OF FREE THYROXINE: CPT | Performed by: NURSE PRACTITIONER

## 2023-03-15 PROCEDURE — 99214 PR OFFICE/OUTPT VISIT, EST, LEVL IV, 30-39 MIN: ICD-10-PCS | Mod: S$PBB,,, | Performed by: NURSE PRACTITIONER

## 2023-03-15 PROCEDURE — 80069 RENAL FUNCTION PANEL: CPT | Performed by: NURSE PRACTITIONER

## 2023-03-15 PROCEDURE — 36415 COLL VENOUS BLD VENIPUNCTURE: CPT | Performed by: NURSE PRACTITIONER

## 2023-03-15 PROCEDURE — 99213 OFFICE O/P EST LOW 20 MIN: CPT | Mod: PBBFAC,27,PO | Performed by: STUDENT IN AN ORGANIZED HEALTH CARE EDUCATION/TRAINING PROGRAM

## 2023-03-15 RX ORDER — METHIMAZOLE 5 MG/1
TABLET ORAL
Qty: 45 TABLET | Refills: 2 | Status: SHIPPED | OUTPATIENT
Start: 2023-03-15 | End: 2023-07-27 | Stop reason: SDUPTHER

## 2023-03-15 NOTE — PROGRESS NOTES
Subjective:       Patient ID: Chary Min is a 62 y.o. female.    Chief Complaint: Hyperparathyroidism    09/15/2022 endocrine clinic note:  62-year-old female scheduled today as new patient referral to endocrine clinic for hypercalcemia, hyperparathyroidism and vitamin-D deficiency.  Patient had previous workup in 2015 ultrasound March 11, 2015 of thyroid IMPRESSION: Heterogeneous appearing thyroid without discrete nodule identified.  Patient had NM parathyroid on 12/10/2015 IMPRESSION: Normal radioiodine thyroid uptake and scan.  Patient later had bone density 12/10/2015 IMPRESSION: 1. Bone density of the lumbar vertebrae consistent with osteopenia. 2. Normal bone mineral density of the femurs. Calcium level 10.5 on 05/16/2022.  On previous lab and 7 months ago vitamin-D was corrected to 39 with parathyroid at 83.8.  Previously patient had vitamin-D deficiency with vitamin-D level 9.6 on 06/12/2020 since she states she is been on different doses of vitamin-D and is now currently on over-the-counter vitamin-D 1000 IU daily.  Calcium level has increased over the last year and fluctuated.  Patient denies any fractures or kidney stones.  Denies taking any calcium supplementation or taking over-the-counter stomach medicine such as Tums.    Endocrine clinic note 03/15/2023:  62 year female scheduled today for endocrine clinic follow-up.  History of hyperparathyroidism, hypercalcemia with vitamin-D deficiency and hyper thyroidism. Calcium level 10.5 on 05/16/2022 decreased 7.3 on 09/15/2022.  Recent .3, previous PTH corrected to 83.8 with corrected vitamin-D of 39 on 01/27/2022.  Patient denies any kidney stones or fractures.  She denies any symptoms of hypercalcemia she denies any tingling or dystonia.  Hyperthyroidism patient is currently on  mg b.i.d. TSH 4.267, free T3 3.01, free T4 0.93 on 01/03/2023.  Patient is asking about being placed on methimazole for she can be on less tablets per day.   Instructed patient repeat her labs today and adjust medication change PTU the methimazole patient denies any symptoms of hyperthyroidism she denies any weight gain or loss, fatigue, tremors, palpitations or anxiety.     * Final Report *     Reason For Exam  Hyperthyroidism     Radiology Report  Ultrasound March 11, 2015     INDICATION: Hyperthyroidism     Technique: The right and left lobes of the thyroid are normal in size  measuring 4.1 x 1.5 x 1.7 cm and 4.0 x 1.2 x 1.3 cm. Echotexture is  somewhat heterogeneous. The isthmus is within normal limits at 3 mm.  No solid or cystic nodule identified. Vascularity is symmetric and  within normal limits. There is no cervical adenopathy.     IMPRESSION: Heterogeneous appearing thyroid without discrete nodule  identified.     Signature Line  Electronically Signed By: Clark Quintero MD  Date/Time Signed: 03/11/2015 09:30        This document has an image     Result type:     US Thyroid  Result date:     March 11, 2015 9:27 CDT  Result status:  Auth (Verified)  Result title:      US Thyroid  Performed by: Clark Quintero MD on March 11, 2015 9:30 CDT  Verified by:      Clark Quintero MD on March 11, 2015 9:30 CDT  Encounter info:            [id number], [facility], Outpatient, 3/11/2015 - 3/11/2015     Details     Reading Physician      Reading Date  Result Priority  IN REPORT, PROVIDER       12/10/2015           Narrative & Impression  Thyroid radioiodine uptake and scan     INDICATION:Thyrotoxicosis, unspecified without thyrotoxic crisis or  storm     FINDINGS:      Routine 24 hour radioiodine thyroid uptake and scanning was performed  after oral and administration of 287 uCi of Iodine-123 sodium iodide.  Oblique views are included.     There is homogeneous tracer distribution throughout both thyroid  lobes. No photopenic or focal intense foci.     24 hour radioiodine thyroid uptake measures 23% (normal is 15 to 30%  ).     IMPRESSION: Normal radioiodine  thyroid uptake and scan.      Electronically Signed By: Scot Vargas MD  Date/Time Signed: 12/10/2015 10:53        Narrative & Impression     CLINICAL: Osteoporosis     TECHNIQUE: Standard bone mineral density was performed of the lumbar  vertebrae and the femurs.     FINDINGS:  The average T score lumbar vertebrae is -1.3 which is  within the range of osteopenia.     The average T score of the femurs is -0.7 which is within normal  range.     IMPRESSION:     1. Bone density of the lumbar vertebrae consistent with osteopenia.  2. Normal bone mineral density of the femurs.  Electronically Signed By: Michael Byrd MD  Date/Time Signed: 02/10/2022 08:47       Review of Systems   Constitutional:  Negative for activity change, appetite change and fatigue.   HENT:  Negative for dental problem, hearing loss, tinnitus, trouble swallowing and goiter.    Eyes:  Negative for photophobia, pain and visual disturbance.   Respiratory:  Negative for cough, chest tightness and wheezing.    Cardiovascular:  Negative for chest pain, palpitations and leg swelling.   Gastrointestinal:  Negative for abdominal pain, constipation, diarrhea, nausea and reflux.   Endocrine: Negative for cold intolerance, heat intolerance, polydipsia and polyphagia.   Genitourinary:  Negative for difficulty urinating, flank pain, hematuria, hot flashes, menstrual irregularity, menstrual problem, nocturia and urgency.   Musculoskeletal:  Negative for back pain, gait problem, joint swelling, leg pain and joint deformity.   Integumentary:  Negative for color change, pallor, rash and breast discharge.   Allergic/Immunologic: Negative for environmental allergies, food allergies and immunocompromised state.   Neurological:  Negative for tremors, seizures, headaches, coordination difficulties, memory loss and coordination difficulties.   Psychiatric/Behavioral:  Negative for agitation, behavioral problems and sleep disturbance. The patient is not  nervous/anxious.        Objective:      Physical Exam  Constitutional:       General: She is not in acute distress.     Appearance: Normal appearance. She is not ill-appearing.   HENT:      Head: Normocephalic and atraumatic.      Right Ear: External ear normal.      Left Ear: External ear normal.      Nose: Nose normal. No congestion or rhinorrhea.      Mouth/Throat:      Mouth: Mucous membranes are moist.      Pharynx: Oropharynx is clear. No oropharyngeal exudate.   Eyes:      General:         Right eye: No discharge.         Left eye: No discharge.      Conjunctiva/sclera: Conjunctivae normal.      Pupils: Pupils are equal, round, and reactive to light.   Neck:      Thyroid: No thyroid mass, thyromegaly or thyroid tenderness.   Cardiovascular:      Rate and Rhythm: Normal rate and regular rhythm.      Pulses: Normal pulses.      Heart sounds: Normal heart sounds. No murmur heard.  Pulmonary:      Effort: Pulmonary effort is normal. No respiratory distress.      Breath sounds: Normal breath sounds.   Abdominal:      General: Abdomen is flat. Bowel sounds are normal. There is no distension.      Palpations: Abdomen is soft.      Tenderness: There is no abdominal tenderness.   Musculoskeletal:         General: No swelling or tenderness. Normal range of motion.      Cervical back: Normal range of motion and neck supple. No tenderness.      Right lower leg: No edema.      Left lower leg: No edema.   Feet:      Right foot:      Skin integrity: Skin integrity normal.      Left foot:      Skin integrity: Skin integrity normal.   Lymphadenopathy:      Cervical: No cervical adenopathy.   Skin:     General: Skin is warm and dry.      Coloration: Skin is not jaundiced or pale.   Neurological:      General: No focal deficit present.      Mental Status: She is alert and oriented to person, place, and time. Mental status is at baseline.      Coordination: Coordination normal.      Gait: Gait normal.   Psychiatric:         Mood  and Affect: Mood normal.         Behavior: Behavior normal.         Thought Content: Thought content normal.       Assessment:       Problem List Items Addressed This Visit          Renal/    Hypercalcemia    Relevant Orders    Renal Function Panel       Endocrine    Vitamin D deficiency    Relevant Orders    Vitamin D    Hyperthyroidism    Relevant Orders    TSH    T4, Free    T3, Free (OL[facility])     Other Visit Diagnoses       Hyperparathyroidism    -  Primary    Relevant Orders    Renal Function Panel    Vitamin D    PTH, Intact    Calcium, 24 Hr Urine    Creatinine, 24 Hr Urine            Plan:       Hyperparathyroidism  Calcium level 10.5 on 05/16/2022 decreased to 10.3 on 09/15/2022  Recent , previous PTH corrected to 83.8 with corrected vitamin-D of 39 on 01/27/2022 with calcium level remaining elevated at 10.8  Repeat labs today renal panel, PTH, vitamin-D and magnesium level  Correct any deficiencies repeat labs and consider further workup  Return to clinic in 6 months  -     Renal Function Panel; Future; Expected date: 03/15/2023  -     Vitamin D; Future; Expected date: 03/15/2023  -     PTH, Intact; Future; Expected date: 03/15/2023  -     Calcium, 24 Hr Urine; Future; Expected date: 03/16/2023  -     Creatinine, 24 Hr Urine; Future; Expected date: 03/16/2023  Component Ref Range & Units 6 mo ago  (9/15/22) 10 mo ago  (5/16/22) 1 yr ago  (1/27/22) 1 yr ago  (10/5/21) 2 yr ago  (12/24/20) 2 yr ago  (6/12/20) 3 yr ago  (5/8/19)   Sodium Level 136 - 145 mmol/L 137  137  137 R  139  140  137     Potassium Level 3.5 - 5.1 mmol/L 4.3  4.2  3.8 R  4.2  3.8  4.0  4.1    Chloride 98 - 107 mmol/L 102  102  100 R  101  103  102     Carbon Dioxide 23 - 31 mmol/L 25  25  27 R  26  26  27 R     Glucose Level 82 - 115 mg/dL 107  133 High   231 R  147 High   112  231 High  R     Blood Urea Nitrogen 9.8 - 20.1 mg/dL 17.8  18.4  18.8 R  16.7  18.0  20 High  R     Creatinine 0.55 - 1.02 mg/dL 0.84  0.75  0.91 R  0.83   0.67  0.70 R     Calcium Level Total 8.4 - 10.2 mg/dL 10.3 High   10.5 High   10.8 R  11.7 High   10.1  9.8 R              Component Ref Range & Units 6 mo ago 10 mo ago 1 yr ago   Parathyroid Hormone Intact 8.7 - 77.0 pg/mL 121.3 High   111.0 High   83.8 R         Component Ref Range & Units 6 mo ago  (9/15/22) 10 mo ago  (5/16/22) 1 yr ago  (1/27/22) 1 yr ago  (10/5/21) 1 yr ago  (4/12/21) 2 yr ago  (12/24/20) 2 yr ago  (8/31/20)     Hypercalcemia  Calcium level 10.3 on 09/15/2022 decreased from previous 10.5, 10.8 and 11.7  Renal panel today  Asymptomatic  -     Renal Function Panel; Future; Expected date: 03/15/2023    Vitamin D deficiency  Previous vitamin-D 27  Repeat vitamin-D today  -     Vitamin D; Future; Expected date: 03/15/2023  Vit D 25 OH 30.0 - 80.0 ng/mL 27.0 Low   29.3 Low   39.0 R  38.8  34.5  22.1 Low   22.5 Low       Hyperthyroidism  On  mg BID   TSH 4.267, free T3 3.01, free T4 0.93 on 01/03/2023  Repeat labs today patient is requesting to be switched methimazole from PTU  -     TSH; Future; Expected date: 03/15/2023  -     T4, Free; Future; Expected date: 03/15/2023  -     T3, Free ([facility]); Future; Expected date: 03/15/2023  Component Ref Range & Units 2 mo ago  (1/3/23) 2 mo ago  (12/21/22) 10 mo ago  (5/16/22) 1 yr ago  (1/27/22) 1 yr ago  (10/5/21) 1 yr ago  (4/12/21) 2 yr ago  (12/24/20)   Thyroid Stimulating Hormone 0.350 - 4.940 uIU/mL 4.267  3.321  1.7583 R  1.1330 R  1.2507 R  1.8989 R  3.2201 R      Component Ref Range & Units 2 mo ago  (1/3/23) 2 mo ago  (12/21/22) 10 mo ago  (5/16/22) 1 yr ago  (4/12/21)   T3 Free 1.57 - 3.91 pg/mL 3.01  4.25 High   2.85  3.54 R      Component Ref Range & Units 2 mo ago 10 mo ago 1 yr ago 3 yr ago 4 yr ago   Thyroxine Free 0.70 - 1.48 ng/dL 0.93  0.91  0.79  0.96 R  0.96 R          I spent a total of 30 minutes on the day of the visit.  This includes face to face time and non-face to face time preparing to see the patient (eg, review of tests),  obtaining and/or reviewing separately obtained history, documenting clinical information in the electronic or other health record, independently interpreting results and communicating results to the patient/family/caregiver, or care coordinator.

## 2023-03-15 NOTE — PROGRESS NOTES
HPI    RTC 6 month DFE, cat eval OD, OCTrnfl  Last edited by Mattie Morgan MA on 3/15/2023  2:29 PM.            Assessment /Plan     For exam results, see Encounter Report.    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 PSK OS  - s/p CEIOL OS 12/1/2020 w IG vs DME  - OCT mac stable  - D/W Dr. Richmond 2/4/22: IVFA consistent with DME more than IG, OCT mac is stable and vision is preserved. Does not recommend avastin at this time unless vision acutely worsens.  - Edema stable off drops, likely NVS.   12/29/22: minimal residual fluid but stable     2. Severe NPDR OU  -IVFA 7/2021 with MAs, but no NV  Hemoglobin A1c   Date Value Ref Range Status   12/21/2022 7.9 (H) <=7.0 % Final   05/16/2022 7.0 <=7.0 % Final   01/27/2022 8.5 <=7.0    12/29/22-  Doing well; few DBH ou. Today appears moderate OS, severe os. Ctm.   Fundus photos yearly  Discussed bp/bg management for this stage of retinopathy    3. NSC OD  -Patient wishes to wait at this time   -poor dilation  -MRx previously    4.  Physiologic cupping  - 3/23 healthy OCT, CTM yearly OCT       RTC 4 month DFE, cat eval OD

## 2023-03-22 ENCOUNTER — APPOINTMENT (OUTPATIENT)
Dept: LAB | Facility: HOSPITAL | Age: 63
End: 2023-03-22
Attending: NURSE PRACTITIONER

## 2023-03-22 LAB
CALCIUM 24H UR-MCNC: 142.4 MG/DAY (ref 100–300)
CALCIUM UR-MCNC: 8.9 MG/DL
CREAT 24H UR-MCNC: 1030.4 MG/DAY (ref 950–2490)
CREAT UR-MCNC: 64.4 MG/DL (ref 47–110)
TOTAL VOLUME  (OHS): 1600 ML
TOTAL VOLUME  (OHS): 1600 ML

## 2023-03-23 DIAGNOSIS — E21.3 HYPERPARATHYROIDISM: Primary | ICD-10-CM

## 2023-04-12 ENCOUNTER — HOSPITAL ENCOUNTER (OUTPATIENT)
Dept: RADIOLOGY | Facility: HOSPITAL | Age: 63
Discharge: HOME | End: 2023-04-12
Attending: NURSE PRACTITIONER

## 2023-04-12 DIAGNOSIS — E21.3 HYPERPARATHYROIDISM: ICD-10-CM

## 2023-04-12 PROCEDURE — 76536 US EXAM OF HEAD AND NECK: CPT | Mod: TC

## 2023-04-13 DIAGNOSIS — E21.3 HYPERPARATHYROIDISM: Primary | ICD-10-CM

## 2023-04-14 ENCOUNTER — OFFICE VISIT (OUTPATIENT)
Dept: URGENT CARE | Facility: CLINIC | Age: 63
End: 2023-04-14

## 2023-04-14 VITALS
TEMPERATURE: 99 F | RESPIRATION RATE: 16 BRPM | HEART RATE: 74 BPM | WEIGHT: 153 LBS | BODY MASS INDEX: 25.49 KG/M2 | SYSTOLIC BLOOD PRESSURE: 190 MMHG | HEIGHT: 65 IN | OXYGEN SATURATION: 99 % | DIASTOLIC BLOOD PRESSURE: 79 MMHG

## 2023-04-14 DIAGNOSIS — U07.1 COVID-19 VIRUS DETECTED: ICD-10-CM

## 2023-04-14 DIAGNOSIS — R05.9 COUGH, UNSPECIFIED TYPE: ICD-10-CM

## 2023-04-14 DIAGNOSIS — J02.9 SORE THROAT: ICD-10-CM

## 2023-04-14 DIAGNOSIS — U07.1 COVID: Primary | ICD-10-CM

## 2023-04-14 LAB
CTP QC/QA: YES
CTP QC/QA: YES
MOLECULAR STREP A: NEGATIVE
SARS-COV-2 RDRP RESP QL NAA+PROBE: POSITIVE

## 2023-04-14 PROCEDURE — 87651 STREP A DNA AMP PROBE: CPT | Mod: PBBFAC

## 2023-04-14 PROCEDURE — 99215 OFFICE O/P EST HI 40 MIN: CPT | Mod: PBBFAC

## 2023-04-14 PROCEDURE — 99213 PR OFFICE/OUTPT VISIT, EST, LEVL III, 20-29 MIN: ICD-10-PCS | Mod: S$PBB,,,

## 2023-04-14 PROCEDURE — 99213 OFFICE O/P EST LOW 20 MIN: CPT | Mod: S$PBB,,,

## 2023-04-14 PROCEDURE — 87635 SARS-COV-2 COVID-19 AMP PRB: CPT | Mod: PBBFAC

## 2023-04-14 RX ORDER — FLUTICASONE PROPIONATE 50 MCG
2 SPRAY, SUSPENSION (ML) NASAL DAILY
Qty: 18.2 ML | Refills: 0 | Status: SHIPPED | OUTPATIENT
Start: 2023-04-14 | End: 2023-08-07 | Stop reason: SDUPTHER

## 2023-04-14 RX ORDER — LORATADINE 10 MG/1
10 TABLET ORAL DAILY
Qty: 30 TABLET | Refills: 0 | Status: SHIPPED | OUTPATIENT
Start: 2023-04-14 | End: 2023-08-07

## 2023-04-14 RX ORDER — PROMETHAZINE HYDROCHLORIDE AND DEXTROMETHORPHAN HYDROBROMIDE 6.25; 15 MG/5ML; MG/5ML
5 SYRUP ORAL EVERY 6 HOURS PRN
Qty: 118 ML | Refills: 0 | Status: SHIPPED | OUTPATIENT
Start: 2023-04-14 | End: 2023-04-24

## 2023-04-14 RX ORDER — BENZONATATE 200 MG/1
200 CAPSULE ORAL 3 TIMES DAILY PRN
Qty: 30 CAPSULE | Refills: 0 | Status: SHIPPED | OUTPATIENT
Start: 2023-04-14 | End: 2023-04-24

## 2023-04-14 NOTE — PROGRESS NOTES
"Subjective:      Patient ID: Chary Min is a 62 y.o. female.    Vitals:  height is 5' 5.35" (1.66 m) and weight is 69.4 kg (153 lb). Her oral temperature is 99.1 °F (37.3 °C). Her blood pressure is 190/79 (abnormal) and her pulse is 74. Her respiration is 16 and oxygen saturation is 99%.     Chief Complaint: Cough (X 2 days) and Sore Throat (X 2 days)    PT states cough and sore throat for the last 2 days. Son with same symptoms.    Cough  Associated symptoms include a sore throat.   Sore Throat   Associated symptoms include coughing.     Constitution: Negative.   HENT:  Positive for sore throat.    Neck: neck negative.   Cardiovascular: Negative.    Eyes: Negative.    Respiratory:  Positive for cough.    Gastrointestinal: Negative.    Genitourinary: Negative.    Musculoskeletal: Negative.    Skin: Negative.    Neurological: Negative.     Objective:     Physical Exam   Constitutional: She is oriented to person, place, and time. normal  HENT:   Head: Normocephalic.   Ears:   Right Ear: Tympanic membrane, external ear and ear canal normal.   Left Ear: Tympanic membrane, external ear and ear canal normal.   Nose: Nose normal.   Mouth/Throat: Uvula is midline and mucous membranes are normal. Mucous membranes are moist. Posterior oropharyngeal erythema present. Oropharynx is clear.   Eyes: Pupils are equal, round, and reactive to light.   Cardiovascular: Normal rate, regular rhythm, normal heart sounds and normal pulses.   Pulmonary/Chest: Effort normal and breath sounds normal.   Abdominal: Normal appearance. Soft.   Musculoskeletal: Normal range of motion.         General: Normal range of motion.   Neurological: She is alert and oriented to person, place, and time.   Skin: Skin is warm and dry.   Vitals reviewed.  Results for orders placed or performed in visit on 04/14/23   POCT COVID-19 Rapid Screening   Result Value Ref Range    POC Rapid COVID Positive (A) Negative     Acceptable Yes    POCT " Strep A, Molecular   Result Value Ref Range    Molecular Strep A, POC Negative Negative     Acceptable Yes        Assessment:     1. COVID    2. Cough, unspecified type    3. Sore throat        Plan:       COVID  -     fluticasone propionate (FLONASE) 50 mcg/actuation nasal spray; 2 sprays (100 mcg total) by Each Nostril route once daily.  Dispense: 18.2 mL; Refill: 0  -     loratadine (CLARITIN) 10 mg tablet; Take 1 tablet (10 mg total) by mouth once daily.  Dispense: 30 tablet; Refill: 0  -     benzonatate (TESSALON) 200 MG capsule; Take 1 capsule (200 mg total) by mouth 3 (three) times daily as needed for Cough.  Dispense: 30 capsule; Refill: 0  -     promethazine-dextromethorphan (PROMETHAZINE-DM) 6.25-15 mg/5 mL Syrp; Take 5 mLs by mouth every 6 (six) hours as needed (cough).  Dispense: 118 mL; Refill: 0    Cough, unspecified type  -     POCT COVID-19 Rapid Screening    Sore throat  -     POCT COVID-19 Rapid Screening  -     POCT Strep A, Molecular    Discussed Paxlovid, pt preferred OTC medication.    Please drink plenty of fluids.  Please get plenty of rest.    Take over the counter Tylenol (Acetaminophen) and/or Motrin (Ibuprofen) as directed for control of pain and/or fever.  Please follow up with your primary care doctor.     ER precautions given, patient verbalized understanding.     Please see provided patient education for guidance.    Follow up with PCP or return to clinic if symptoms worsen or do not improve.

## 2023-05-15 ENCOUNTER — OFFICE VISIT (OUTPATIENT)
Dept: INTERNAL MEDICINE | Facility: CLINIC | Age: 63
End: 2023-05-15

## 2023-05-15 VITALS
HEART RATE: 73 BPM | RESPIRATION RATE: 20 BRPM | BODY MASS INDEX: 25.32 KG/M2 | OXYGEN SATURATION: 98 % | DIASTOLIC BLOOD PRESSURE: 68 MMHG | TEMPERATURE: 99 F | SYSTOLIC BLOOD PRESSURE: 124 MMHG | WEIGHT: 153.81 LBS

## 2023-05-15 DIAGNOSIS — E78.5 HYPERLIPIDEMIA, UNSPECIFIED HYPERLIPIDEMIA TYPE: ICD-10-CM

## 2023-05-15 DIAGNOSIS — E11.9 TYPE 2 DIABETES MELLITUS WITHOUT COMPLICATION, WITH LONG-TERM CURRENT USE OF INSULIN: ICD-10-CM

## 2023-05-15 DIAGNOSIS — Z79.4 TYPE 2 DIABETES MELLITUS WITHOUT COMPLICATION, WITH LONG-TERM CURRENT USE OF INSULIN: ICD-10-CM

## 2023-05-15 DIAGNOSIS — I10 HYPERTENSION, UNSPECIFIED TYPE: ICD-10-CM

## 2023-05-15 DIAGNOSIS — E05.90 HYPERTHYROIDISM: ICD-10-CM

## 2023-05-15 DIAGNOSIS — E89.41 HOT FLASHES DUE TO SURGICAL MENOPAUSE: Primary | ICD-10-CM

## 2023-05-15 LAB
CREAT UR-MCNC: 65.7 MG/DL (ref 47–110)
PROT UR STRIP-MCNC: 41.7 MG/DL
URINE PROTEIN/CREATININE RATIO (OHS): 0.6

## 2023-05-15 PROCEDURE — 99215 OFFICE O/P EST HI 40 MIN: CPT | Mod: PBBFAC

## 2023-05-15 PROCEDURE — 82570 ASSAY OF URINE CREATININE: CPT

## 2023-05-15 RX ORDER — AMLODIPINE BESYLATE 10 MG
10 TABLET ORAL DAILY
Qty: 90 TABLET | Refills: 3 | Status: SHIPPED | OUTPATIENT
Start: 2023-05-15 | End: 2023-08-07 | Stop reason: SDUPTHER

## 2023-05-15 RX ORDER — LINAGLIPTIN 5 MG/1
5 TABLET, FILM COATED ORAL DAILY
Qty: 90 TABLET | Refills: 3 | Status: SHIPPED | OUTPATIENT
Start: 2023-05-15 | End: 2023-08-07 | Stop reason: SDUPTHER

## 2023-05-15 RX ORDER — ATORVASTATIN CALCIUM 40 MG/1
40 TABLET, FILM COATED ORAL DAILY
Qty: 90 TABLET | Refills: 3 | Status: SHIPPED | OUTPATIENT
Start: 2023-05-15 | End: 2023-08-07 | Stop reason: SDUPTHER

## 2023-05-15 RX ORDER — CALCIUM CITRATE/VITAMIN D3 200MG-6.25
1 TABLET ORAL 2 TIMES DAILY
Qty: 200 EACH | Refills: 2 | Status: SHIPPED | OUTPATIENT
Start: 2023-05-15

## 2023-05-15 RX ORDER — METOPROLOL SUCCINATE 25 MG/1
25 TABLET, EXTENDED RELEASE ORAL DAILY
Qty: 90 TABLET | Refills: 3 | Status: SHIPPED | OUTPATIENT
Start: 2023-05-15 | End: 2023-08-07 | Stop reason: SDUPTHER

## 2023-05-15 RX ORDER — INSULIN DETEMIR 100 [IU]/ML
30 INJECTION, SOLUTION SUBCUTANEOUS NIGHTLY
Qty: 9 ML | Refills: 11 | Status: SHIPPED | OUTPATIENT
Start: 2023-05-15 | End: 2023-08-07

## 2023-05-15 RX ORDER — PEN NEEDLE, DIABETIC 32GX 5/32"
NEEDLE, DISPOSABLE MISCELLANEOUS
Qty: 100 EACH | Refills: 4 | Status: SHIPPED | OUTPATIENT
Start: 2023-05-15

## 2023-05-15 RX ORDER — METFORMIN HYDROCHLORIDE 1000 MG/1
1000 TABLET ORAL 2 TIMES DAILY
Qty: 180 TABLET | Refills: 3 | Status: SHIPPED | OUTPATIENT
Start: 2023-05-15 | End: 2023-08-07 | Stop reason: SDUPTHER

## 2023-05-15 RX ORDER — LISINOPRIL 40 MG/1
40 TABLET ORAL DAILY
Qty: 90 TABLET | Refills: 3 | Status: SHIPPED | OUTPATIENT
Start: 2023-05-15 | End: 2023-08-07 | Stop reason: SDUPTHER

## 2023-05-15 NOTE — PROGRESS NOTES
[facility] INTERNAL MEDICINE  OUTPATIENT OFFICE VISIT NOTE    SUBJECTIVE:      HPI: Chary Min is a 62 y.o. yo female w/ PMH of hypertension, hyperthyroidism, hyperlipidemia and vitamin D deficiency who presents today for routine follow up.     Today, patient report she is feeling well but has experienced worsening hot flashes and night sweats over the past 2 months. She states she has not experienced hot flashes since her hysterectomy several ago. She does not follow with GYN. She also continues to reports wheezing at night.      ROS:  (+) hot flashes, night sweats  (-) Chest pain, palpitations, SOB, fever, night sweats, chills, diarrhea, constipation.       OBJECTIVE:     Vital signs:   /68 (BP Location: Right arm, Patient Position: Sitting, BP Method: Medium (Automatic))   Pulse 73   Temp 98.6 °F (37 °C) (Oral)   Resp 20   Wt 69.8 kg (153 lb 12.8 oz)   SpO2 98%   BMI 25.32 kg/m²      Physical Examination:  General: Well nourished w/o distress  HEENT: NC/AT; PERRLA; nasal and oral mucosa moist and clear; small oropharynx  Pulm: CTA bilaterally, normal work of breathing  CV: S1, S2 w/o murmurs or gallops  GI: Soft with normal bowel sounds in all quadrants, no masses on palpation  MSK: no lower extremity edema  Derm: No rashes, abnormal bruising, or skin lesions  Neuro: AAOx4  Psych: Cooperative; appropriate mood and affect         ASSESSMENT & PLAN:     Primary hypertension  - Follow by Cardiology, last seen 7/28/2022: EF 60% with diastolic dysfunction, no changes made to meds  - Previously stopped combo pill since HCTZ might make hypercalcemia worse   - Continue Lisinopril 40 mg daily, Amlodipine 10 mg daily. Will add metoprolol succinate 25 mg.  - Advised to keep a BP log, low salt diet    Nightly wheezing vs stridor  Nightly snoring  - Patient has small oropharynx  - Description sounds as if patient may have MARK  - Refer for sleep study     Hot flashes  - Refer to GYN     Macular edema of left  eye  Combined forms of age-related cataract of right eye  - Followed by Ophthalmology with last seen 8/26/2022  - Will defer management to Ophthalmology     Mixed hyperlipidemia  - Elevated  in 12/2021  - Advised on low fat/cholesterol diet  - Continue Atorvastatin 40 mg daily  - FLP on 12/2022 wnl.     Hypercalcemia  Hyperparathyroidism  - Found to have elevated calcium and parathyroid hormone (83 in 1/2022).   - Ca 10.5, , VitD 29.3 in 5/2022  - DEXA 2/2022  The average T score lumbar vertebrae is -1.3 which is within the range of osteopenia. The average T score of the femurs is -0.7 which is within normal range.  - Advised to hydrate well. and call back if she gets sx.   - Followed [facility]. Will defer management     Iron deficiency anemia secondary to inadequate dietary iron intake  - No signs of active bleeding   - FIT neg 5/2021   - Was ordered cologuard but cannot afford it. Will order FIT test.  - Continue iron sulfate 325 mg Monday, Wednesday, Friday     Uncontrolled type 2 diabetes mellitus with hyperglycemia  - A1c 7.9 in 12/2022  - Meds: Determir 30 units at bedtime, metformin 1000mg BID, Tradjenta 5 mg daily.  - Previously d/c glimepiride 2mg in 2018. was started on Januvia but she stopped taking on her own because FBG sometimes in 90s. Stopped Tradjenta in 10/2021 on her own because FBG in 80-90s. No episode or sx of hypoglycemia.  - Diabetic foot exam in clinic 12/2022, see physical exam   - Diabetic eye exam -follows optho  - Proteinuria screening- elevated UPC/microalbumin of 42.1 in 1/2022  - Asked to measure and keep a log of her blood sugar levels before breakfast and 2 hrs after largest meal  - Advised on following diabetic diet and daily exercise  - Advised patient on the importance of medication compliance.     Vitamin D deficiency  - VitD 29.3 in 5/2022  - Previously completed vitD 50,000 units for 8 weeks  - Continue Vit D3 2000 units daily  -will recheck values  before next visit.     Hyperthyroidism  - TSH 3.250, Free T4 0.89, Free T3 2.68 in 4/2023 - all wnl  - Thyroid uptake scan normal, thyrotropin receptor Ab level WNL, unknown etiology  - Denies any symptoms of thyrotoxicosis  - Compliant with meds  - Swtiched to Methimazole from PTU by endo recently  - Continue Methimazole  - Followed by [facility] Endocrinology. Will defer management.        Healthcare maintenance  ASCVD risk- 22.8 % . Cont Statin daily.   HM labs (CBC, CMP, FLP, A1c, TSH, vitD): Will repeat labs before next time  HIV/Hep panel/syphilis: negative 5/2022  Pneumococcal vaccine (due at 65 unless special situations): at 65  Tdap: refused (does not have insurance but will have it in July 2023)  Zoster vaccine: refused (does not have insurance but will have it in July 2023)  Flu: refused (does not have insurance but will have it in July 2023)  FIT/colonoscopy: DUE (does not have insurance but will have it in July 2023)  Lung cancer screening (LDCT age 50-80): NA  AAA screening (men, age 65-75): NA  Mammogram (after 40): 2/2022 BIRADS 1/2; due 2/2023. Will scheudle now.  DEXA scan: 2/2022 with osteopenia, rpt in 2-3 yrs   GYN (pap smears): had hysterectomy in past due to menorrhagia, referred to GYN as of 5/15/23       Return to clinic in 5 months.       Shanon Bhagat MD  [facility], [facility]  5/15/2023

## 2023-06-26 ENCOUNTER — OFFICE VISIT (OUTPATIENT)
Dept: OPHTHALMOLOGY | Facility: CLINIC | Age: 63
End: 2023-06-26

## 2023-06-26 VITALS — HEIGHT: 65 IN | BODY MASS INDEX: 25.71 KG/M2 | WEIGHT: 154.31 LBS

## 2023-06-26 DIAGNOSIS — E11.3413 SEVERE NONPROLIFERATIVE DIABETIC RETINOPATHY OF BOTH EYES WITH MACULAR EDEMA ASSOCIATED WITH TYPE 2 DIABETES MELLITUS: Primary | ICD-10-CM

## 2023-06-26 PROCEDURE — 99213 OFFICE O/P EST LOW 20 MIN: CPT | Mod: PBBFAC,PO | Performed by: STUDENT IN AN ORGANIZED HEALTH CARE EDUCATION/TRAINING PROGRAM

## 2023-06-26 NOTE — PROGRESS NOTES
OCT mac   05/26/22:  OD: normal foveal contour  OS: hard exudates, edema inferotemporally  05/04/22  OD: 227, NFC, no IRF/SRF  OS: 259, NFC, trace IRF, improved  08/26/22  OD: 229, normal foveal contour  OS: 252, trace IRF, stable since May 2022 but improved significantly since 2/2022 12/29/22   OD: 219, NFC no srf/irf, ez intact   OS: 238, PFC, minimal nonCI irf   3/23 OD healthy // OS slight temporal fovea cyst    1. PSK OS  - s/p CEIOL OS 12/1/2020 w IG vs DME  - OCT mac stable  - D/W Dr. Richmond 2/4/22: IVFA consistent with DME more than IG, OCT mac is stable and vision is preserved. Does not recommend avastin at this time unless vision acutely worsens.  - Edema stable off drops, likely NVS.   12/29/22: minimal residual fluid but stable     2. Severe NPDR OU  -IVFA 7/2021 with MAs, but no NV  Hemoglobin A1c   Date Value Ref Range Status   12/21/2022 7.9 (H) <=7.0 % Final   05/16/2022 7.0 <=7.0 % Final   01/27/2022 8.5 <=7.0    12/29/22-  Doing well; few DBH ou. Today appears moderate OS, severe os. Ctm.   Fundus photos yearly  Discussed bp/bg management for this stage of retinopathy    3. NSC OD  -poor dilation  -MRx 6/26/23 BCVA 20/30, pt does not want surgery yet    4.  Physiologic cupping  - 3/23 healthy OCT, CTM yearly OCT       RTC 3 mo DFE

## 2023-07-21 NOTE — PROGRESS NOTES
CHIEF COMPLAINT:   Chief Complaint   Patient presents with    F/U 1 year visit     Denies any cardiac problems                                                  HPI:  Chary Min 63 y.o. female w/ PMH of hypertension, hyperthyroidism, hyperlipidemia and vitamin D deficiency who presents today for routine follow up.  At last appointment patient denied any cardiac complaints.    Today the patient denies any chest pain, shortness a breath, palpitations, orthopnea, PND, peripheral edema, lightheadedness, dizziness, syncope, or claudication symptoms.  She states that overall she has been feeling very well since her last appointment.  She states that she is able to complete her ADLs without any ischemic symptoms or issues.  She states that she is active at home.  She reports following a mostly healthy diet.  She reports compliance with all medications. Denies any tobacco use.                                                                                                                                                                                                                                                                                                  CARDIAC TESTING:  Echo 4.27.22  Left ventricular ejection fraction is measured at approximately 60%.        Treadmill Nuclear Stress 4.25.22  No reversible ischemia  No scar  Patient Active Problem List   Diagnosis    Macular edema of left eye    Combined forms of age-related cataract of right eye    Hypertension    Diabetes mellitus type 2, uncontrolled    Vitamin D deficiency    Hyperlipidemia    Hyperthyroidism    FLAVIA (iron deficiency anemia)    Hypercalcemia    Systolic murmur     Past Surgical History:   Procedure Laterality Date    BREAST SURGERY      EYE SURGERY      HYSTERECTOMY      TUBAL LIGATION       Social History     Socioeconomic History    Marital status:    Tobacco Use    Smoking status: Former     Types: Cigarettes     Quit date: 1998      "Years since quittin.5    Smokeless tobacco: Never   Substance and Sexual Activity    Alcohol use: Not Currently    Drug use: Never    Sexual activity: Yes     Partners: Male        Family History   Problem Relation Age of Onset    Hypertension Mother     Dementia Mother      Review of patient's allergies indicates:  No Known Allergies      ROS:  Review of Systems   Constitutional: Negative.    HENT: Negative.     Eyes: Negative.    Respiratory: Negative.  Negative for shortness of breath.    Cardiovascular: Negative.    Gastrointestinal: Negative.    Genitourinary: Negative.    Musculoskeletal: Negative.    Skin: Negative.    Neurological: Negative.    Endo/Heme/Allergies: Negative.    Psychiatric/Behavioral: Negative.                                                                                                                                                                                Negative except as stated in the history of present illness. See HPI for details.    PHYSICAL EXAM:  Visit Vitals  BP (!) 140/80 (BP Location: Left arm, Patient Position: Sitting, BP Method: Large (Manual))   Pulse 74   Temp 97.8 °F (36.6 °C) (Oral)   Resp 20   Ht 5' 4" (1.626 m)   Wt 70.1 kg (154 lb 8.7 oz)   SpO2 95%   BMI 26.53 kg/m²       Physical Exam  HENT:      Head: Normocephalic.      Nose: Nose normal.   Eyes:      Pupils: Pupils are equal, round, and reactive to light.   Cardiovascular:      Rate and Rhythm: Normal rate and regular rhythm.   Pulmonary:      Effort: Pulmonary effort is normal.   Abdominal:      General: Abdomen is flat. Bowel sounds are normal.      Palpations: Abdomen is soft.   Musculoskeletal:         General: Normal range of motion.      Cervical back: Normal range of motion.   Skin:     General: Skin is warm.   Neurological:      General: No focal deficit present.      Mental Status: She is alert.   Psychiatric:         Mood and Affect: Mood normal.       Current Outpatient Medications " "  Medication Instructions    atorvastatin (LIPITOR) 40 mg, Oral, Daily    FeroSuL 325 mg, Oral, Every other day    fluticasone propionate (FLONASE) 100 mcg, Each Nostril, Daily    ketorolac 0.4% (ACULAR) 0.4 % Drop PLACE 1 DROP IN THE LEFT EYE FOUR TIMES A DAY    LEVEMIR FLEXTOUCH U100 INSULIN 30 Units, Subcutaneous, Nightly    lisinopriL (PRINIVIL,ZESTRIL) 40 mg, Oral, Daily    loratadine (CLARITIN) 10 mg, Oral, Daily    metFORMIN (GLUCOPHAGE) 1,000 mg, Oral, 2 times daily    methIMAzole (TAPAZOLE) 5 mg, Oral, Daily, Take 1.5 tablets (7.5 mg) once a day    metoprolol succinate (TOPROL-XL) 25 mg, Oral, Daily    NORVASC 10 mg, Oral, Daily    PRED FORTE 1 % DrpS INSTILL 1 DROP IN THE LEFT EYE THREE TIMES A DAY    TECHLITE PEN NEEDLE 32 gauge x 5/32" Ndle USE ONE DAILY    TRADJENTA 5 mg, Oral, Daily    TRUE METRIX GLUCOSE METER Misc use as directed    TRUE METRIX GLUCOSE TEST STRIP Strp 1 each, skin prick, 2 times daily, Use to check blood glucose        All medications, laboratory studies, cardiac diagnostic imaging reviewed.     Lab Results   Component Value Date    LDL 70.00 12/21/2022    .00 12/24/2020    TRIG 85 12/21/2022    TRIG 86 12/24/2020    CREATININE 0.78 05/15/2023    MG 1.60 09/15/2022    K 4.3 05/15/2023        ASSESSMENT/PLAN:  Primary hypertension  - BP near goal today - 140/80  - Nurse visit in 2 weeks for blood pressure check.  If blood pressure still borderline high/high will need to increase one of her current anti-hypertensive agents  - states that she believes blood pressure is elevated today due to white coat syndrome and recently foods rich in salt  - Continue Lisinopril 40 mg daily, Amlodipine 10 mg daily and Metoprolol Succinate 25 MG daily  - Counseled on low-sodium diet and exercise as tolerated  - EF 60% with diastolic dysfunction    Mixed hyperlipidemia  - LDL 70 on 12.21.22  - Continue Atorvastatin 40 mg daily  - Counseled on low-cholesterol, heart healthy diet and exercise as " tolerated     Uncontrolled type 2 diabetes mellitus with hyperglycemia  - A1c 8.1 per labs 5.15.23  - management per PCP     Nightly snoring  -Wheezing at night, witnessed snoring from   - Concern for MARK  - Sleep study ordered per PCP       EKG today  Nurse visit in 2 weeks for BP check (call)  If home BP is consistently elevated will increase one of her current medications for better BP control   Follow up in cardiology clinic in 1 year or sooner if needed  Follow up with PCP as directed

## 2023-07-27 ENCOUNTER — OFFICE VISIT (OUTPATIENT)
Dept: CARDIOLOGY | Facility: CLINIC | Age: 63
End: 2023-07-27
Payer: MEDICARE

## 2023-07-27 VITALS
HEART RATE: 74 BPM | SYSTOLIC BLOOD PRESSURE: 140 MMHG | WEIGHT: 154.56 LBS | OXYGEN SATURATION: 95 % | BODY MASS INDEX: 26.39 KG/M2 | TEMPERATURE: 98 F | DIASTOLIC BLOOD PRESSURE: 80 MMHG | HEIGHT: 64 IN | RESPIRATION RATE: 20 BRPM

## 2023-07-27 DIAGNOSIS — E05.90 HYPERTHYROIDISM: Primary | ICD-10-CM

## 2023-07-27 DIAGNOSIS — I10 PRIMARY HYPERTENSION: Primary | ICD-10-CM

## 2023-07-27 DIAGNOSIS — E78.2 MIXED HYPERLIPIDEMIA: ICD-10-CM

## 2023-07-27 PROCEDURE — 99215 OFFICE O/P EST HI 40 MIN: CPT | Mod: PBBFAC

## 2023-07-27 PROCEDURE — 93005 ELECTROCARDIOGRAM TRACING: CPT

## 2023-07-27 RX ORDER — METHIMAZOLE 5 MG/1
5 TABLET ORAL DAILY
Qty: 30 TABLET | Refills: 3 | Status: SHIPPED | OUTPATIENT
Start: 2023-07-27 | End: 2023-11-20

## 2023-07-27 NOTE — TELEPHONE ENCOUNTER
Last visit in [facility] ENDOCRINOLOGY: 3/15/2023    Patient's next visit in [facility] ENDOCRINOLOGY: 9/15/2023

## 2023-07-27 NOTE — PATIENT INSTRUCTIONS
EKG today  Nurse visit in 2 weeks for BP check (call)  If home BP is consistently elevated will increase one of her current medications for better BP control   Follow up in cardiology clinic in 1 year or sooner if needed  Follow up with PCP as directed

## 2023-07-27 NOTE — TELEPHONE ENCOUNTER
----- Message from Elayne Balderas sent at 7/27/2023 12:54 PM CDT -----  Regarding: Refill request  #FELTON#    Pt needs a refill on her Methimazole 5 mg. She went down to pharm and they told her she has no more refills. Pt is here at hospital now. Please advise.

## 2023-08-07 ENCOUNTER — OFFICE VISIT (OUTPATIENT)
Dept: INTERNAL MEDICINE | Facility: CLINIC | Age: 63
End: 2023-08-07
Payer: MEDICARE

## 2023-08-07 VITALS
DIASTOLIC BLOOD PRESSURE: 70 MMHG | WEIGHT: 154 LBS | HEART RATE: 62 BPM | SYSTOLIC BLOOD PRESSURE: 124 MMHG | OXYGEN SATURATION: 99 % | BODY MASS INDEX: 26.43 KG/M2 | RESPIRATION RATE: 18 BRPM | TEMPERATURE: 98 F

## 2023-08-07 DIAGNOSIS — U07.1 COVID: ICD-10-CM

## 2023-08-07 DIAGNOSIS — E11.36 TYPE 2 DIABETES MELLITUS WITH DIABETIC CATARACT, WITH LONG-TERM CURRENT USE OF INSULIN: Primary | ICD-10-CM

## 2023-08-07 DIAGNOSIS — Z79.4 TYPE 2 DIABETES MELLITUS WITH DIABETIC CATARACT, WITH LONG-TERM CURRENT USE OF INSULIN: Primary | ICD-10-CM

## 2023-08-07 DIAGNOSIS — E78.5 HYPERLIPIDEMIA, UNSPECIFIED HYPERLIPIDEMIA TYPE: ICD-10-CM

## 2023-08-07 DIAGNOSIS — Z79.4 TYPE 2 DIABETES MELLITUS WITHOUT COMPLICATION, WITH LONG-TERM CURRENT USE OF INSULIN: ICD-10-CM

## 2023-08-07 DIAGNOSIS — E11.9 TYPE 2 DIABETES MELLITUS WITHOUT COMPLICATION, WITH LONG-TERM CURRENT USE OF INSULIN: ICD-10-CM

## 2023-08-07 DIAGNOSIS — Z23 NEED FOR VACCINATION: ICD-10-CM

## 2023-08-07 DIAGNOSIS — I10 HYPERTENSION, UNSPECIFIED TYPE: ICD-10-CM

## 2023-08-07 PROBLEM — E11.39 TYPE 2 DIABETES MELLITUS WITH OPHTHALMIC COMPLICATION, WITH LONG-TERM CURRENT USE OF INSULIN: Status: ACTIVE | Noted: 2022-05-16

## 2023-08-07 LAB — HBA1C MFR BLD: 8.7 %

## 2023-08-07 PROCEDURE — 99213 OFFICE O/P EST LOW 20 MIN: CPT | Mod: PBBFAC,25 | Performed by: STUDENT IN AN ORGANIZED HEALTH CARE EDUCATION/TRAINING PROGRAM

## 2023-08-07 PROCEDURE — 90677 PCV20 VACCINE IM: CPT | Mod: PBBFAC

## 2023-08-07 PROCEDURE — 83036 HEMOGLOBIN GLYCOSYLATED A1C: CPT | Mod: PBBFAC | Performed by: STUDENT IN AN ORGANIZED HEALTH CARE EDUCATION/TRAINING PROGRAM

## 2023-08-07 RX ORDER — METFORMIN HYDROCHLORIDE 1000 MG/1
1000 TABLET ORAL 2 TIMES DAILY
Qty: 180 TABLET | Refills: 3 | Status: SHIPPED | OUTPATIENT
Start: 2023-08-07 | End: 2024-01-22 | Stop reason: SDUPTHER

## 2023-08-07 RX ORDER — INSULIN DETEMIR 100 [IU]/ML
33 INJECTION, SOLUTION SUBCUTANEOUS NIGHTLY
Qty: 9.9 ML | Refills: 11 | Status: SHIPPED | OUTPATIENT
Start: 2023-08-07 | End: 2024-01-22 | Stop reason: SDUPTHER

## 2023-08-07 RX ORDER — LISINOPRIL 40 MG/1
40 TABLET ORAL DAILY
Qty: 90 TABLET | Refills: 3 | Status: SHIPPED | OUTPATIENT
Start: 2023-08-07 | End: 2024-01-22 | Stop reason: SDUPTHER

## 2023-08-07 RX ORDER — LINAGLIPTIN 5 MG/1
5 TABLET, FILM COATED ORAL DAILY
Qty: 90 TABLET | Refills: 3 | Status: SHIPPED | OUTPATIENT
Start: 2023-08-07 | End: 2024-01-22 | Stop reason: SDUPTHER

## 2023-08-07 RX ORDER — FERROUS SULFATE TAB 325 MG (65 MG ELEMENTAL FE) 325 (65 FE) MG
325 TAB ORAL EVERY OTHER DAY
Qty: 45 TABLET | Refills: 3 | Status: SHIPPED | OUTPATIENT
Start: 2023-08-07 | End: 2024-01-22 | Stop reason: SDUPTHER

## 2023-08-07 RX ORDER — METOPROLOL SUCCINATE 25 MG/1
25 TABLET, EXTENDED RELEASE ORAL DAILY
Qty: 90 TABLET | Refills: 3 | Status: SHIPPED | OUTPATIENT
Start: 2023-08-07 | End: 2024-01-22 | Stop reason: SDUPTHER

## 2023-08-07 RX ORDER — AMLODIPINE BESYLATE 10 MG
10 TABLET ORAL DAILY
Qty: 90 TABLET | Refills: 3 | Status: SHIPPED | OUTPATIENT
Start: 2023-08-07 | End: 2024-01-22 | Stop reason: SDUPTHER

## 2023-08-07 RX ORDER — ATORVASTATIN CALCIUM 40 MG/1
40 TABLET, FILM COATED ORAL DAILY
Qty: 90 TABLET | Refills: 3 | Status: SHIPPED | OUTPATIENT
Start: 2023-08-07 | End: 2024-01-22 | Stop reason: SDUPTHER

## 2023-08-07 RX ORDER — FLUTICASONE PROPIONATE 50 MCG
2 SPRAY, SUSPENSION (ML) NASAL DAILY
Qty: 18.2 ML | Refills: 0 | Status: SHIPPED | OUTPATIENT
Start: 2023-08-07

## 2023-08-07 NOTE — PROGRESS NOTES
[facility] INTERNAL MEDICINE  OUTPATIENT OFFICE VISIT NOTE    SUBJECTIVE:      HPI: Chary Min is a 63 y.o. yo female w/ PMH of hypertension, hyperthyroidism, hyperlipidemia, and vitamin D deficiency who presents today for routine follow up.     Today, denies any complaints at this time.     ROS:  (+) hot flashes improved, night sweats improved  (-) Chest pain, palpitations, SOB, fever, night sweats, chills, diarrhea, constipation.       OBJECTIVE:     Vital signs:   /70 (BP Location: Right arm, Patient Position: Sitting, BP Method: Medium (Automatic))   Pulse 62   Temp 98.1 °F (36.7 °C) (Oral)   Resp 18   Wt 69.9 kg (154 lb)   SpO2 99%   BMI 26.43 kg/m²      Physical Examination:  General: Well nourished w/o distress  HEENT: NC/AT; PERRLA; nasal and oral mucosa moist and clear; small oropharynx  Pulm: CTA bilaterally, normal work of breathing  CV: S1, S2 w/o murmurs or gallops  GI: Soft with normal bowel sounds in all quadrants, no masses on palpation  MSK: no lower extremity edema  Derm: No rashes, abnormal bruising, or skin lesions  Neuro: AAOx4  Psych: Cooperative; appropriate mood and affect       ASSESSMENT & PLAN:     Primary hypertension  - /70 today  - Followed by Cardiology, last seen 7/28/2022: EF 60% with diastolic dysfunction, no changes made to meds  - Previously stopped combo pill since HCTZ might make hypercalcemia worse   - Continue Lisinopril 40 mg daily, Amlodipine 10 mg daily, Metoprolol succinate 25 mg.  - Advised to keep a BP log, low salt diet    Nightly wheezing vs stridor  Nightly snoring  - Patient has small oropharynx  - Description sounds as if patient may have MARK  - Referred for sleep study     Hot flashes  - Referred to GYN      Macular edema of left eye  Combined forms of age-related cataract of right eye  - Followed by Ophthalmology with last seen 6/2023  - Will defer management to Ophthalmology     Mixed hyperlipidemia  - , HDL 50, TG 85, LDL 70 in 12/2022  -  Advised on low fat/cholesterol diet  - Continue Atorvastatin 40 mg daily     Hypercalcemia  Hyperparathyroidism  - Found to have elevated calcium and parathyroid hormone (83 in 1/2022; most recent 87.5 in 3/2023).   - Ca 10.6, PTH 87.5, VitD 29.5 in 3/2023  - DEXA 2/2022  The average T score lumbar vertebrae is -1.3 which is within the range of osteopenia. The average T score of the femurs is -0.7 which is within normal range.  - Advised to hydrate well. and call back if she gets sx.   - Followed by [facility] Endocrinology. Will defer management     Iron deficiency anemia secondary to inadequate dietary iron intake  - No signs of active bleeding   - FIT neg 5/2021   - Was ordered cologuard but cannot afford it. Will order FIT test.  - Continue iron sulfate 325 mg Monday, Wednesday, Friday     Uncontrolled type 2 diabetes mellitus with hyperglycemia  - A1c 8.1 in 5/2023, POC A1c 8.7 today   - Meds:  metformin 1000mg BID, Tradjenta 5 mg daily; INCREASE Determir 30 to 33 units at bedtime  - Previously d/c glimepiride 2mg in 2018. was started on Januvia but she stopped taking on her own because FBG sometimes in 90s. Stopped Tradjenta in 10/2021 on her own because FBG in 80-90s. No episode or sx of hypoglycemia.  - Diabetic foot exam in clinic 12/2022, see physical exam   - Diabetic eye exam -follows optho  - Proteinuria screening- elevated UPC/microalbumin of 42.1 in 1/2022  - Asked to measure and keep a log of her blood sugar levels before breakfast and 2 hrs after largest meal  - Advised on following diabetic diet and daily exercise  - Advised patient on the importance of medication compliance.     Vitamin D deficiency  - VitD 29.3 in 5/2022  - Previously completed vitD 50,000 units for 8 weeks  - Continue Vit D3 2000 units daily  -will recheck values before next visit.     Hyperthyroidism  - TSH 3.250, Free T4 0.89, Free T3 2.68 in 4/2023 - all wnl  - Thyroid uptake scan normal, thyrotropin receptor Ab level WNL, unknown  etiology  - Denies any symptoms of thyrotoxicosis  - Compliant with meds  - Swtiched to Methimazole from PTU by endo recently  - Continue Methimazole  - Followed by [facility] Endocrinology. Will defer management.        Healthcare maintenance  ASCVD risk- 22.8 % . Cont Statin daily.   HM labs (CBC, CMP, FLP, A1c, TSH, vitD): Will repeat labs before next time  HIV/Hep panel/syphilis: negative 5/2022  Pneumococcal vaccine: PCV20 8/2023  Tdap: refused as of 8/2023  Zoster vaccine: refused as of 8/2023  Flu: plans to get next season  FIT/colonoscopy: FIT negative in 1/2023  Lung cancer screening (LDCT age 50-80): NA  AAA screening (men, age 65-75): NA  Mammogram (after 40): 2/2022 BIRADS 1/2; due 2/2023. Ordered, pending schedule.  DEXA scan: 2/2022 with osteopenia, rpt in 2-3 yrs   GYN (pap smears): had hysterectomy in past due to menorrhagia, referred to GYN as of 5/15/23       Return to clinic in 5 months.       Shanon Bhagat MD  [facility] Internal Medicine, HO-2  8/7/2023

## 2023-08-08 NOTE — PROGRESS NOTES
Attending Addendum:   Patient seen and examined in clinic. Management and Plan were discussed with resident. Care was reasonable and necessary.   Aida Lo MD  [facility] - Internal Medicine

## 2023-09-12 DIAGNOSIS — G47.33 OSA (OBSTRUCTIVE SLEEP APNEA): Primary | ICD-10-CM

## 2023-09-28 ENCOUNTER — OFFICE VISIT (OUTPATIENT)
Dept: OPHTHALMOLOGY | Facility: CLINIC | Age: 63
End: 2023-09-28
Payer: MEDICARE

## 2023-09-28 VITALS — BODY MASS INDEX: 26.29 KG/M2 | HEIGHT: 64 IN | WEIGHT: 154 LBS

## 2023-09-28 DIAGNOSIS — H25.811 COMBINED FORMS OF AGE-RELATED CATARACT OF RIGHT EYE: ICD-10-CM

## 2023-09-28 DIAGNOSIS — H40.003 GLAUCOMA SUSPECT OF BOTH EYES: ICD-10-CM

## 2023-09-28 DIAGNOSIS — E11.3413 SEVERE NONPROLIFERATIVE DIABETIC RETINOPATHY OF BOTH EYES WITH MACULAR EDEMA ASSOCIATED WITH TYPE 2 DIABETES MELLITUS: Primary | ICD-10-CM

## 2023-09-28 PROCEDURE — 99213 OFFICE O/P EST LOW 20 MIN: CPT | Mod: PBBFAC,PN

## 2023-09-28 NOTE — PROGRESS NOTES
HPI    RTC 3 mo DFE   Patient's A1C on 8/7/2023 was 8.7  Patient states that she  wishes not to be dilated today   Last edited by Mattie Morgan MA on 9/28/2023 10:26 AM.            Assessment /Plan     OCT mac   05/26/22:  OD: normal foveal contour  OS: hard exudates, edema inferotemporally  05/04/22  OD: 227, NFC, no IRF/SRF  OS: 259, NFC, trace IRF, improved  08/26/22  OD: 229, normal foveal contour  OS: 252, trace IRF, stable since May 2022 but improved significantly since 2/2022 12/29/22   OD: 219, NFC no srf/irf, ez intact   OS: 238, PFC, minimal nonCI irf   3/23 OD healthy // OS slight temporal fovea cyst    1. PSK OS  - s/p CEIOL OS 12/1/2020 w IG vs DME  - OCT mac stable  - D/W Dr. Richmond 2/4/22: IVFA consistent with DME more than IG, OCT mac is stable and vision is preserved. Does not recommend avastin at this time unless vision acutely worsens.  - Edema stable off drops, likely NVS.   12/29/22: minimal residual fluid but stable     2. Severe NPDR OU  -IVFA 7/2021 with MAs, but no NV  Hemoglobin A1c   Date Value Ref Range Status   12/21/2022 7.9 (H) <=7.0 % Final   05/16/2022 7.0 <=7.0 % Final   01/27/2022 8.5 <=7.0    12/29/22-  Doing well; few DBH ou. Today appears moderate OS, severe os. Ctm.   Fundus photos yearly  Discussed bp/bg management for this stage of retinopathy  - 9/28/23: PT unable to be dilated today, RTC 3-4 wk for repeat exam    3. NSC OD  -poor dilation  -MRx 6/26/23 BCVA 20/30, pt does not want surgery yet    4.  Physiologic cupping  5. Occular HTN 23//23  - 3/23 healthy OCT, CTM yearly OCT  - CCT next visit       RTC 3-4 wk for CCT and DFE     There are no diagnoses linked to this encounter.  Eduard León MD  [employer] Ophthalmology PGY-2

## 2023-11-20 DIAGNOSIS — E05.90 HYPERTHYROIDISM: ICD-10-CM

## 2023-11-20 RX ORDER — METHIMAZOLE 5 MG/1
TABLET ORAL
Qty: 30 TABLET | Refills: 0 | Status: SHIPPED | OUTPATIENT
Start: 2023-11-20 | End: 2023-12-28

## 2023-12-15 DIAGNOSIS — E11.36 TYPE 2 DIABETES MELLITUS WITH DIABETIC CATARACT, WITH LONG-TERM CURRENT USE OF INSULIN: Primary | ICD-10-CM

## 2023-12-15 DIAGNOSIS — Z79.4 TYPE 2 DIABETES MELLITUS WITH DIABETIC CATARACT, WITH LONG-TERM CURRENT USE OF INSULIN: Primary | ICD-10-CM

## 2023-12-21 ENCOUNTER — TELEPHONE (OUTPATIENT)
Dept: INTERNAL MEDICINE | Facility: CLINIC | Age: 63
End: 2023-12-21

## 2023-12-21 NOTE — TELEPHONE ENCOUNTER
MS. GRETEL SUTHERLAND LVM STATING THAT SHE HAD A NURSE VISIT THIS MORNING, BUT IS UNABLE TO MAKE IT.

## 2023-12-28 DIAGNOSIS — E05.90 HYPERTHYROIDISM: ICD-10-CM

## 2023-12-28 RX ORDER — METHIMAZOLE 5 MG/1
TABLET ORAL
Qty: 30 TABLET | Refills: 0 | Status: SHIPPED | OUTPATIENT
Start: 2023-12-28 | End: 2024-01-25 | Stop reason: SDUPTHER

## 2023-12-28 NOTE — TELEPHONE ENCOUNTER
Last visit with Payton Damian NP: 3/15/2023      Patient's next visit in [facility] ENDOCRINOLOGY: 1/25/2024

## 2024-01-17 ENCOUNTER — LAB VISIT (OUTPATIENT)
Dept: LAB | Facility: HOSPITAL | Age: 64
End: 2024-01-17
Attending: STUDENT IN AN ORGANIZED HEALTH CARE EDUCATION/TRAINING PROGRAM
Payer: MEDICARE

## 2024-01-17 DIAGNOSIS — E05.90 HYPERTHYROIDISM: ICD-10-CM

## 2024-01-17 LAB — T3FREE SERPL-MCNC: 3.1 PG/ML (ref 1.58–3.91)

## 2024-01-17 PROCEDURE — 84443 ASSAY THYROID STIM HORMONE: CPT

## 2024-01-17 PROCEDURE — 36415 COLL VENOUS BLD VENIPUNCTURE: CPT

## 2024-01-17 PROCEDURE — 84439 ASSAY OF FREE THYROXINE: CPT

## 2024-01-17 PROCEDURE — 84481 FREE ASSAY (FT-3): CPT

## 2024-01-18 LAB
T4 FREE SERPL-MCNC: 1.07 NG/DL (ref 0.7–1.48)
TSH SERPL-ACNC: 3.36 UIU/ML (ref 0.35–4.94)

## 2024-01-19 ENCOUNTER — TELEPHONE (OUTPATIENT)
Dept: ENDOCRINOLOGY | Facility: CLINIC | Age: 64
End: 2024-01-19
Payer: MEDICARE

## 2024-01-19 NOTE — TELEPHONE ENCOUNTER
----- Message from Payton Damian NP sent at 1/18/2024  4:17 PM CST -----  Please instruct the patient with her current labs she is to remain on her methimazole 5 mg until her follow-up visit in March.  Please instruct her this is an appropriate dose and she can continue it until her follow-up visit.

## 2024-01-19 NOTE — PROGRESS NOTES
Phoned  left detailed message informing with her current lab results she is to remain on her methimazole 5 mg until her follow-up visit in March.  Also dialed  unable to leave message as voicemail not set-up.

## 2024-01-22 ENCOUNTER — OFFICE VISIT (OUTPATIENT)
Dept: INTERNAL MEDICINE | Facility: CLINIC | Age: 64
End: 2024-01-22
Payer: MEDICARE

## 2024-01-22 ENCOUNTER — LAB VISIT (OUTPATIENT)
Dept: LAB | Facility: HOSPITAL | Age: 64
End: 2024-01-22
Attending: STUDENT IN AN ORGANIZED HEALTH CARE EDUCATION/TRAINING PROGRAM
Payer: MEDICARE

## 2024-01-22 VITALS
SYSTOLIC BLOOD PRESSURE: 140 MMHG | TEMPERATURE: 98 F | RESPIRATION RATE: 20 BRPM | DIASTOLIC BLOOD PRESSURE: 74 MMHG | BODY MASS INDEX: 26.47 KG/M2 | OXYGEN SATURATION: 99 % | WEIGHT: 154.19 LBS | HEART RATE: 75 BPM

## 2024-01-22 DIAGNOSIS — Z12.11 SCREEN FOR COLON CANCER: ICD-10-CM

## 2024-01-22 DIAGNOSIS — E05.90 HYPERTHYROIDISM: ICD-10-CM

## 2024-01-22 DIAGNOSIS — E11.9 TYPE 2 DIABETES MELLITUS WITHOUT COMPLICATION, WITH LONG-TERM CURRENT USE OF INSULIN: Primary | ICD-10-CM

## 2024-01-22 DIAGNOSIS — E21.3 HYPERPARATHYROIDISM: ICD-10-CM

## 2024-01-22 DIAGNOSIS — E55.9 VITAMIN D DEFICIENCY: ICD-10-CM

## 2024-01-22 DIAGNOSIS — I10 HYPERTENSION, UNSPECIFIED TYPE: ICD-10-CM

## 2024-01-22 DIAGNOSIS — Z79.4 TYPE 2 DIABETES MELLITUS WITHOUT COMPLICATION, WITH LONG-TERM CURRENT USE OF INSULIN: Primary | ICD-10-CM

## 2024-01-22 DIAGNOSIS — Z79.4 TYPE 2 DIABETES MELLITUS WITH DIABETIC CATARACT, WITH LONG-TERM CURRENT USE OF INSULIN: ICD-10-CM

## 2024-01-22 DIAGNOSIS — E11.36 TYPE 2 DIABETES MELLITUS WITH DIABETIC CATARACT, WITH LONG-TERM CURRENT USE OF INSULIN: ICD-10-CM

## 2024-01-22 DIAGNOSIS — E78.5 HYPERLIPIDEMIA, UNSPECIFIED HYPERLIPIDEMIA TYPE: ICD-10-CM

## 2024-01-22 LAB
CREAT UR-MCNC: 41.3 MG/DL (ref 45–106)
HBA1C MFR BLD: 7.8 %
PROT UR STRIP-MCNC: 8.5 MG/DL
URINE PROTEIN/CREATININE RATIO (OLG): 0.2

## 2024-01-22 PROCEDURE — 82570 ASSAY OF URINE CREATININE: CPT

## 2024-01-22 PROCEDURE — 99214 OFFICE O/P EST MOD 30 MIN: CPT | Mod: PBBFAC | Performed by: STUDENT IN AN ORGANIZED HEALTH CARE EDUCATION/TRAINING PROGRAM

## 2024-01-22 PROCEDURE — 83036 HEMOGLOBIN GLYCOSYLATED A1C: CPT | Mod: PBBFAC | Performed by: STUDENT IN AN ORGANIZED HEALTH CARE EDUCATION/TRAINING PROGRAM

## 2024-01-22 RX ORDER — LISINOPRIL 40 MG/1
40 TABLET ORAL DAILY
Qty: 90 TABLET | Refills: 3 | Status: SHIPPED | OUTPATIENT
Start: 2024-01-22

## 2024-01-22 RX ORDER — METFORMIN HYDROCHLORIDE 1000 MG/1
1000 TABLET ORAL 2 TIMES DAILY
Qty: 180 TABLET | Refills: 3 | Status: SHIPPED | OUTPATIENT
Start: 2024-01-22 | End: 2025-01-21

## 2024-01-22 RX ORDER — FERROUS SULFATE TAB 325 MG (65 MG ELEMENTAL FE) 325 (65 FE) MG
325 TAB ORAL EVERY OTHER DAY
Qty: 45 TABLET | Refills: 3 | Status: SHIPPED | OUTPATIENT
Start: 2024-01-22

## 2024-01-22 RX ORDER — INSULIN DETEMIR 100 [IU]/ML
33 INJECTION, SOLUTION SUBCUTANEOUS NIGHTLY
Qty: 9.9 ML | Refills: 11 | Status: SHIPPED | OUTPATIENT
Start: 2024-01-22 | End: 2024-01-22

## 2024-01-22 RX ORDER — AMLODIPINE BESYLATE 10 MG/1
10 TABLET ORAL DAILY
Qty: 90 TABLET | Refills: 3 | Status: SHIPPED | OUTPATIENT
Start: 2024-01-22 | End: 2025-01-21

## 2024-01-22 RX ORDER — LINAGLIPTIN 5 MG/1
5 TABLET, FILM COATED ORAL DAILY
Qty: 90 TABLET | Refills: 3 | Status: SHIPPED | OUTPATIENT
Start: 2024-01-22

## 2024-01-22 RX ORDER — INSULIN DETEMIR 100 [IU]/ML
30 INJECTION, SOLUTION SUBCUTANEOUS NIGHTLY
Qty: 9 ML | Refills: 11 | Status: SHIPPED | OUTPATIENT
Start: 2024-01-22 | End: 2025-01-21

## 2024-01-22 RX ORDER — METOPROLOL SUCCINATE 25 MG/1
25 TABLET, EXTENDED RELEASE ORAL DAILY
Qty: 90 TABLET | Refills: 3 | Status: SHIPPED | OUTPATIENT
Start: 2024-01-22 | End: 2025-01-21

## 2024-01-22 RX ORDER — ATORVASTATIN CALCIUM 40 MG/1
40 TABLET, FILM COATED ORAL DAILY
Qty: 90 TABLET | Refills: 3 | Status: SHIPPED | OUTPATIENT
Start: 2024-01-22 | End: 2025-01-21

## 2024-01-22 NOTE — PROGRESS NOTES
[facility] INTERNAL MEDICINE  OUTPATIENT OFFICE VISIT NOTE    SUBJECTIVE:      HPI: Chary Min is a 63 y.o. yo female w/ PMH of hypertension, hyperthyroidism, hyperlipidemia, and vitamin D deficiency who presents today for routine follow up.     Today, patient reports occasional lower back pain after sitting all day for bingo. She also reports occasional swelling in her thighs since her HCTZ was discontinued. No crackles on lung auscultation or edema noted on physical exam. Patient reports increasing insulin to 33u nightly for a month before going back to 30u nightly because of glucose readings in the 70s and 80s. She denies any symptoms but states she was worried because her son has been symptomatic with glucose in the 60s. Patient educated on normal glucose readings.    ROS:  (-) Chest pain, palpitations, SOB, fever, night sweats, chills, diarrhea, constipation.       OBJECTIVE:     Vital signs:   BP (!) 147/70 (BP Location: Left arm, Patient Position: Sitting, BP Method: Medium (Automatic))   Pulse 75   Temp 98.4 °F (36.9 °C) (Oral)   Resp 20   Wt 69.9 kg (154 lb 3.2 oz)   SpO2 99%   BMI 26.47 kg/m²      Physical Examination:  General: Well nourished w/o distress  HEENT: NC/AT; nasal and oral mucosa moist and clear  Pulm: CTA bilaterally, normal work of breathing  CV: S1, S2 w/o murmurs or gallops  GI: Soft with normal bowel sounds in all quadrants, no masses on palpation  MSK: no lower extremity edema  Derm: No rashes, abnormal bruising, or skin lesions  Neuro: AAOx4  Psych: Cooperative; appropriate mood and affect    Diabetic foot exam:   Left: normal monofilament exam, dry skin, minimal callus around heal, 2+ dp, 1+ pt, no wounds/ulcers/corns  Right: normal monofilament exam, dry skin, minimal callus around heal, 2+ dp, 1+ pt, no wounds/ulcers/corns     ASSESSMENT & PLAN:     Hypertension  - /70 today  - Followed by Cardiology, last seen 7/28/2022: EF 60% with diastolic dysfunction, no changes made  to meds  - Previously stopped combo pill since HCTZ might make hypercalcemia worse   - Continue Lisinopril 40 mg daily, Amlodipine 10 mg daily, Metoprolol succinate 25 mg.  - Start jardiance 10 mg daily  - Advised to keep a BP log, low salt diet    Uncontrolled type 2 diabetes mellitus with hyperglycemia  -POC A1c 7.8 today from 8.7; goal 7-7.5  - Meds:  metformin 1000mg BID, Tradjenta 5 mg daily; Determir 30u QHS; start jardiance 10 mg daily  - Previously d/c glimepiride 2mg in 2018. was started on Januvia but she stopped taking on her own because FBG sometimes in 90s. Stopped Tradjenta in 10/2021 on her own because FBG in 80-90s. No episode or sx of hypoglycemia.  - Diabetic foot exam in clinic 1/2024  - Diabetic eye exam -follows optho  - Proteinuria screening- elevated UPC/microalbumin of 42.1 in 1/2022  - Asked to measure and keep a log of her blood sugar levels before breakfast and 2 hrs after largest meal  - Advised on following diabetic diet and daily exercise  - Advised patient on the importance of medication compliance.    Mixed hyperlipidemia  - , HDL 50, TG 85, LDL 70 in 12/2022, repeat FLP  - Advised on low fat/cholesterol diet  - Continue Atorvastatin 40 mg daily    Nightly wheezing vs stridor  Nightly snoring  - Patient has small oropharynx  - Description sounds as if patient may have MARK  - Referred for sleep study but appt cancelled by patient in 9/2023    Hot flashes  - Referred to GYN in 5/2023, referral pending review at this time     Macular edema of left eye  Combined forms of age-related cataract of right eye  - Followed by Ophthalmology with last seen 6/2023  - Will defer management to Ophthalmology     Hypercalcemia  Hyperparathyroidism  - Found to have elevated calcium and parathyroid hormone (83 in 1/2022; most recent 87.5 in 3/2023).   - Ca 10.6, PTH 87.5, VitD 29.5 in 3/2023  - DEXA 2/2022  The average T score lumbar vertebrae is -1.3 which is within the range of osteopenia. The  average T score of the femurs is -0.7 which is within normal range.  - Advised to hydrate well. and call back if she gets sx.   - US parathyroid negative for adenoma 4/2023  - Followed by [facility] Endocrinology. Will defer management     Iron deficiency anemia secondary to inadequate dietary iron intake  - No signs of active bleeding   - FIT neg 5/2021   - Was ordered cologuard but cannot afford it. Will order FIT test.  - Continue iron sulfate 325 mg Monday, Wednesday, Friday     Vitamin D deficiency  - VitD 29.3 in 5/2022  - Previously completed vitD 50,000 units for 8 weeks  - Continue Vit D3 2000 units daily  - Will recheck values before next visit.     Hyperthyroidism  - TSH 3.361, Free T4 1.07, Free T3 3.10 in 1/2024 - all wnl  - Thyroid uptake scan normal, thyrotropin receptor Ab level WNL, unknown etiology  - Denies any symptoms of thyrotoxicosis  - Compliant with meds  - Continue Methimazole 5 mg per Endo  - Followed by [facility] Endocrinology. Will defer management.        Healthcare maintenance  ASCVD risk- 22.8 % . Cont Statin daily.   HM labs (CBC, CMP, FLP, A1c, TSH, vitD): Repeat all except TSH  HIV/Hep panel/syphilis: negative 5/2022  Pneumococcal vaccine: PCV20 8/2023  Tdap: refused   Zoster vaccine: refused  Flu: refused  FIT/colonoscopy: FIT negative in 1/2023, Cologuard ordered 1/2024  Lung cancer screening (LDCT age 50-80): NA  AAA screening (men, age 65-75): NA  Mammogram (after 40): 2/2022 BIRADS 1/2; due 2/2023. Ordered, pending schedule.  DEXA scan: 2/2022 with osteopenia, rpt in 2-3 yrs   GYN (pap smears): had hysterectomy in past due to menorrhagia, referred to GYN as of 5/15/23       Return to clinic in 5 months.   Routine labs before next visit.    Shanon Bhagat MD  [facility] Internal Medicine, HO-2  1/22/2024

## 2024-01-25 ENCOUNTER — OFFICE VISIT (OUTPATIENT)
Dept: ENDOCRINOLOGY | Facility: CLINIC | Age: 64
End: 2024-01-25
Payer: MEDICARE

## 2024-01-25 ENCOUNTER — LAB VISIT (OUTPATIENT)
Dept: LAB | Facility: HOSPITAL | Age: 64
End: 2024-01-25
Payer: MEDICARE

## 2024-01-25 VITALS
HEIGHT: 65 IN | WEIGHT: 155.63 LBS | BODY MASS INDEX: 25.93 KG/M2 | DIASTOLIC BLOOD PRESSURE: 85 MMHG | HEART RATE: 72 BPM | SYSTOLIC BLOOD PRESSURE: 150 MMHG | TEMPERATURE: 98 F | RESPIRATION RATE: 16 BRPM

## 2024-01-25 DIAGNOSIS — I10 HYPERTENSION, UNSPECIFIED TYPE: ICD-10-CM

## 2024-01-25 DIAGNOSIS — E21.3 HYPERPARATHYROIDISM: ICD-10-CM

## 2024-01-25 DIAGNOSIS — E83.52 HYPERCALCEMIA: ICD-10-CM

## 2024-01-25 DIAGNOSIS — R79.0 LOW MAGNESIUM LEVEL: ICD-10-CM

## 2024-01-25 DIAGNOSIS — E05.90 HYPERTHYROIDISM: ICD-10-CM

## 2024-01-25 DIAGNOSIS — E21.3 HYPERPARATHYROIDISM: Primary | ICD-10-CM

## 2024-01-25 DIAGNOSIS — E78.5 HYPERLIPIDEMIA, UNSPECIFIED HYPERLIPIDEMIA TYPE: ICD-10-CM

## 2024-01-25 DIAGNOSIS — E55.9 VITAMIN D DEFICIENCY: ICD-10-CM

## 2024-01-25 LAB
ALBUMIN SERPL-MCNC: 3.9 G/DL (ref 3.4–4.8)
BUN SERPL-MCNC: 19.9 MG/DL (ref 9.8–20.1)
CALCIUM SERPL-MCNC: 10.2 MG/DL (ref 8.4–10.2)
CHLORIDE SERPL-SCNC: 107 MMOL/L (ref 98–107)
CHOLEST SERPL-MCNC: 134 MG/DL
CHOLEST/HDLC SERPL: 3 {RATIO} (ref 0–5)
CO2 SERPL-SCNC: 24 MMOL/L (ref 23–31)
CREAT SERPL-MCNC: 0.99 MG/DL (ref 0.55–1.02)
DEPRECATED CALCIDIOL+CALCIFEROL SERPL-MC: 82.1 NG/ML (ref 30–80)
GFR SERPLBLD CREATININE-BSD FMLA CKD-EPI: >60 MLS/MIN/1.73/M2
GLUCOSE SERPL-MCNC: 158 MG/DL (ref 82–115)
HDLC SERPL-MCNC: 48 MG/DL (ref 35–60)
LDLC SERPL CALC-MCNC: 54 MG/DL (ref 50–140)
MAGNESIUM SERPL-MCNC: 1.5 MG/DL (ref 1.6–2.6)
PHOSPHATE SERPL-MCNC: 4.2 MG/DL (ref 2.3–4.7)
POTASSIUM SERPL-SCNC: 3.7 MMOL/L (ref 3.5–5.1)
PTH-INTACT SERPL-MCNC: 66.1 PG/ML (ref 8.7–77)
SODIUM SERPL-SCNC: 141 MMOL/L (ref 136–145)
TRIGL SERPL-MCNC: 161 MG/DL (ref 37–140)
VLDLC SERPL CALC-MCNC: 32 MG/DL

## 2024-01-25 PROCEDURE — 83735 ASSAY OF MAGNESIUM: CPT

## 2024-01-25 PROCEDURE — 80069 RENAL FUNCTION PANEL: CPT

## 2024-01-25 PROCEDURE — 99214 OFFICE O/P EST MOD 30 MIN: CPT | Mod: S$PBB,,, | Performed by: NURSE PRACTITIONER

## 2024-01-25 PROCEDURE — 83970 ASSAY OF PARATHORMONE: CPT

## 2024-01-25 PROCEDURE — 36415 COLL VENOUS BLD VENIPUNCTURE: CPT

## 2024-01-25 PROCEDURE — 82306 VITAMIN D 25 HYDROXY: CPT

## 2024-01-25 PROCEDURE — 99215 OFFICE O/P EST HI 40 MIN: CPT | Mod: PBBFAC | Performed by: NURSE PRACTITIONER

## 2024-01-25 PROCEDURE — 80061 LIPID PANEL: CPT

## 2024-01-25 RX ORDER — MAGNESIUM GLUCONATE 27.5 (500)
500 TABLET ORAL DAILY
Qty: 90 TABLET | Refills: 1 | Status: SHIPPED | OUTPATIENT
Start: 2024-01-25

## 2024-01-25 RX ORDER — METHIMAZOLE 5 MG/1
TABLET ORAL
Qty: 30 TABLET | Refills: 2 | Status: SHIPPED | OUTPATIENT
Start: 2024-01-25

## 2024-01-25 RX ORDER — VIT C/E/ZN/COPPR/LUTEIN/ZEAXAN 250MG-90MG
1000 CAPSULE ORAL DAILY
Qty: 30 CAPSULE | Refills: 5 | Status: SHIPPED | OUTPATIENT
Start: 2024-01-25

## 2024-01-25 NOTE — PROGRESS NOTES
Subjective     Patient ID: Chary Min is a 63 y.o. female.    Chief Complaint: Hyperparathyroidism    09/15/2022 endocrine clinic note:  62-year-old female scheduled today as new patient referral to endocrine clinic for hypercalcemia, hyperparathyroidism and vitamin-D deficiency.  Patient had previous workup in 2015 ultrasound March 11, 2015 of thyroid IMPRESSION: Heterogeneous appearing thyroid without discrete nodule identified.  Patient had NM parathyroid on 12/10/2015 IMPRESSION: Normal radioiodine thyroid uptake and scan.  Patient later had bone density 12/10/2015 IMPRESSION: 1. Bone density of the lumbar vertebrae consistent with osteopenia. 2. Normal bone mineral density of the femurs. Calcium level 10.5 on 05/16/2022.  On previous lab and 7 months ago vitamin-D was corrected to 39 with parathyroid at 83.8.  Previously patient had vitamin-D deficiency with vitamin-D level 9.6 on 06/12/2020 since she states she is been on different doses of vitamin-D and is now currently on over-the-counter vitamin-D 1000 IU daily.  Calcium level has increased over the last year and fluctuated.  Patient denies any fractures or kidney stones.  Denies taking any calcium supplementation or taking over-the-counter stomach medicine such as Tums.     Endocrine clinic note 03/15/2023:  62 year female scheduled today for endocrine clinic follow-up.  History of hyperparathyroidism, hypercalcemia with vitamin-D deficiency and hyper thyroidism. Calcium level 10.5 on 05/16/2022 decreased 7.3 on 09/15/2022.  Recent .3, previous PTH corrected to 83.8 with corrected vitamin-D of 39 on 01/27/2022.  Patient denies any kidney stones or fractures.  She denies any symptoms of hypercalcemia she denies any tingling or dystonia.  Hyperthyroidism patient is currently on  mg b.i.d. TSH 4.267, free T3 3.01, free T4 0.93 on 01/03/2023.  Patient is asking about being placed on methimazole for she can be on less tablets per day.   Instructed patient repeat her labs today and adjust medication change PTU the methimazole patient denies any symptoms of hyperthyroidism she denies any weight gain or loss, fatigue, tremors, palpitations or anxiety.     Endocrine clinic note 01/25/2024:  63 year female scheduled today for endocrine clinic follow-up.  History of hyperparathyroidism, hypercalcemia, and vitamin-D deficiency with hyperthyroidism. Hyperparathyroidism Calcium level 10.6 on 05/15/2023, Recent , previous PTH corrected to 83.8 with corrected vitamin-D of 39. 24 hour calcium and creatinine normal 03/15/2023, US thyroid No adenoma seen 01/25/2023. NM Parathyroid ordered today.  Patient reports occasionally tingling to her hands she denies any constipation she denies kidney stones or fractures since her previous visit.  Hyperthyroidism patient was DC off PTU and started on methimazole low-dose currently on methimazole 5 mg TSH 3.361, Free T4 1.07, Free T3 3.10 on 01/17/2024.  We will reduce methimazole to half a tablet a day.      * Final Report *     Reason For Exam  Hyperthyroidism     Radiology Report  Ultrasound March 11, 2015     INDICATION: Hyperthyroidism     Technique: The right and left lobes of the thyroid are normal in size  measuring 4.1 x 1.5 x 1.7 cm and 4.0 x 1.2 x 1.3 cm. Echotexture is  somewhat heterogeneous. The isthmus is within normal limits at 3 mm.  No solid or cystic nodule identified. Vascularity is symmetric and  within normal limits. There is no cervical adenopathy.     IMPRESSION: Heterogeneous appearing thyroid without discrete nodule  identified.     Signature Line  Electronically Signed By: Clark Quintero MD  Date/Time Signed: 03/11/2015 09:30        This document has an image     Result type:     US Thyroid  Result date:     March 11, 2015 9:27 CDT  Result status:  Auth (Verified)  Result title:      US Thyroid  Performed by: Clark Quintero MD on March 11, 2015 9:30 CDT  Verified by:      Ingrid GARCIA  Clark CRUZ on March 11, 2015 9:30 CDT  Encounter info:            [id number], [facility], Outpatient, 3/11/2015 - 3/11/2015     Details     Reading Physician      Reading Date  Result Priority  IN REPORT, PROVIDER       12/10/2015           Narrative & Impression  Thyroid radioiodine uptake and scan     INDICATION:Thyrotoxicosis, unspecified without thyrotoxic crisis or  storm     FINDINGS:      Routine 24 hour radioiodine thyroid uptake and scanning was performed  after oral and administration of 287 uCi of Iodine-123 sodium iodide.  Oblique views are included.     There is homogeneous tracer distribution throughout both thyroid  lobes. No photopenic or focal intense foci.     24 hour radioiodine thyroid uptake measures 23% (normal is 15 to 30%  ).     IMPRESSION: Normal radioiodine thyroid uptake and scan.      Electronically Signed By: Scot Vargas MD  Date/Time Signed: 12/10/2015 10:53        Narrative & Impression     CLINICAL: Osteoporosis     TECHNIQUE: Standard bone mineral density was performed of the lumbar  vertebrae and the femurs.     FINDINGS:  The average T score lumbar vertebrae is -1.3 which is  within the range of osteopenia.     The average T score of the femurs is -0.7 which is within normal  range.     IMPRESSION:     1. Bone density of the lumbar vertebrae consistent with osteopenia.  2. Normal bone mineral density of the femurs.  Electronically Signed By: Michael Byrd MD  Date/Time Signed: 02/10/2022 08:47              Review of Systems   Constitutional:  Negative for activity change, appetite change and fatigue.   HENT:  Negative for dental problem, hearing loss, tinnitus, trouble swallowing and goiter.    Eyes:  Negative for photophobia, pain and visual disturbance.   Respiratory:  Negative for cough, chest tightness and wheezing.    Cardiovascular:  Negative for chest pain, palpitations and leg swelling.   Gastrointestinal:  Negative for abdominal pain, constipation,  diarrhea, nausea and reflux.   Endocrine: Negative for cold intolerance, heat intolerance, polydipsia and polyphagia.   Genitourinary:  Negative for difficulty urinating, flank pain, hematuria, hot flashes, menstrual irregularity, menstrual problem, nocturia and urgency.   Musculoskeletal:  Negative for back pain, gait problem, joint swelling, leg pain and joint deformity.   Integumentary:  Negative for color change, pallor, rash and breast discharge.   Allergic/Immunologic: Negative for environmental allergies, food allergies and immunocompromised state.   Neurological:  Negative for tremors, seizures, headaches, memory loss and coordination difficulties.   Psychiatric/Behavioral:  Negative for agitation, behavioral problems and sleep disturbance. The patient is not nervous/anxious.           Objective     Physical Exam  Constitutional:       General: She is not in acute distress.     Appearance: Normal appearance. She is not ill-appearing.   HENT:      Head: Normocephalic and atraumatic.      Right Ear: External ear normal.      Left Ear: External ear normal.      Nose: Nose normal. No congestion or rhinorrhea.      Mouth/Throat:      Mouth: Mucous membranes are moist.      Pharynx: Oropharynx is clear. No oropharyngeal exudate.   Eyes:      General:         Right eye: No discharge.         Left eye: No discharge.      Conjunctiva/sclera: Conjunctivae normal.      Pupils: Pupils are equal, round, and reactive to light.   Neck:      Thyroid: No thyroid mass, thyromegaly or thyroid tenderness.   Cardiovascular:      Rate and Rhythm: Normal rate and regular rhythm.      Pulses: Normal pulses.      Heart sounds: Normal heart sounds. No murmur heard.  Pulmonary:      Effort: Pulmonary effort is normal. No respiratory distress.      Breath sounds: Normal breath sounds.   Abdominal:      General: Abdomen is flat. Bowel sounds are normal. There is no distension.      Palpations: Abdomen is soft.      Tenderness: There is  no abdominal tenderness.   Musculoskeletal:         General: No swelling or tenderness. Normal range of motion.      Cervical back: Normal range of motion and neck supple. No tenderness.      Right lower leg: No edema.      Left lower leg: No edema.   Feet:      Right foot:      Skin integrity: Skin integrity normal.      Left foot:      Skin integrity: Skin integrity normal.   Lymphadenopathy:      Cervical: No cervical adenopathy.   Skin:     General: Skin is warm and dry.      Coloration: Skin is not jaundiced or pale.   Neurological:      General: No focal deficit present.      Mental Status: She is alert and oriented to person, place, and time. Mental status is at baseline.      Coordination: Coordination normal.      Gait: Gait normal.   Psychiatric:         Mood and Affect: Mood normal.         Behavior: Behavior normal.         Thought Content: Thought content normal.            Assessment and Plan     Hyperparathyroidism  Calcium level 10.6 on 05/15/2023   Recent , previous PTH corrected to 83.8 with corrected vitamin-D of 39   24 hour calcium and creatinine normal 03/15/2023  US thyroid No adenoma seen 01/25/2023   NM Parathyroid ordered   -     Renal Function Panel; Future; Expected date: 01/25/2024  -     PTH, Intact; Future; Expected date: 01/25/2024  -     Vitamin D; Future; Expected date: 01/25/2024  -     Magnesium; Future; Expected date: 01/25/2024       -     NM Parathyroid Scan with SPECT Routine; Future; Expected date: 01/25/2024    2. Hypercalcemia  Calcium level 10.6 on 05/15/2023  Occasional tingling             Component Ref Range & Units 8 mo ago  (5/15/23) 10 mo ago  (3/15/23) 1 yr ago  (9/15/22) 1 yr ago  (5/16/22) 1 yr ago  (1/27/22) 2 yr ago  (10/5/21) 3 yr ago  (12/24/20)   Sodium Level 136 - 145 mmol/L 138 138 137 137 137 R 139 140   Potassium Level 3.5 - 5.1 mmol/L 4.3 4.5 4.3 4.2 3.8 R 4.2 3.8   Chloride 98 - 107 mmol/L 104 104 102 102 100 R 101 103   Carbon Dioxide 23 - 31  mmol/L 24 24 25 25 27 R 26 26   Glucose Level 82 - 115 mg/dL 159 High  125 High  107 133 High  231 R 147 High  112   Blood Urea Nitrogen 9.8 - 20.1 mg/dL 12.5 16.6 17.8 18.4 18.8 R 16.7 18.0   Creatinine 0.55 - 1.02 mg/dL 0.78 0.73 0.84 0.75 0.91 R 0.83 0.67   Calcium Level Total 8.4 - 10.2 mg/dL 10.6 High  10.6 High  10.3 High  10.5 High  10.8 R 11.7 High  10.1      -     Renal Function Panel; Future; Expected date: 01/25/2024  -     PTH, Intact; Future; Expected date: 01/25/2024  -     Vitamin D; Future; Expected date: 01/25/2024  -     Magnesium; Future; Expected date: 01/25/2024  -     NM Parathyroid Scan with SPECT Routine; Future; Expected date: 01/25/2024    3. Vitamin D deficiency  Repeat vitamin-D today    4. Hyperthyroidism  On MMI 5 mg daily    Reduce methimazole to 2.5 mg per day  TSH 3.361, Free T4 1.07, Free T3 3.10 on 01/17/2024   Component Ref Range & Units 8 d ago  (1/17/24) 9 mo ago  (4/12/23) 10 mo ago  (3/15/23) 1 yr ago  (1/3/23) 1 yr ago  (12/21/22) 1 yr ago  (5/16/22) 1 yr ago  (1/27/22)   TSH 0.350 - 4.940 uIU/mL 3.361 3.250 3.884 4.267 3.321 1.7583 R 1.1330 R     Component Ref Range & Units 8 d ago  (1/17/24) 9 mo ago  (4/12/23) 10 mo ago  (3/15/23) 1 yr ago  (1/3/23) 1 yr ago  (12/21/22) 1 yr ago  (5/16/22) 2 yr ago  (4/12/21)   T3 Free 1.58 - 3.91 pg/mL 3.10 2.68 R 2.79 R 3.01 R 4.25 High  R 2.85 R 3.54 R     Component Ref Range & Units 8 d ago  (1/17/24) 9 mo ago  (4/12/23) 10 mo ago  (3/15/23) 1 yr ago  (12/21/22) 1 yr ago  (5/16/22) 2 yr ago  (4/12/21) 4 yr ago  (5/1/19)   Thyroxine Free 0.70 - 1.48 ng/dL 1.07 0.89 0.81 0.93 0.91 0.79 0.96 R   -     methIMAzole (TAPAZOLE) 5 MG Tab; Take 1/2 tablet a day  Dispense: 30 tablet; Refill: 2    Follow up in about 6 months (around 7/25/2024) for Hyperthyroidism, Hyperparathyroidism.    I spent a total of 30 minutes on the day of the visit.  This includes face to face time and non-face to face time preparing to see the patient (eg, review of  tests), obtaining and/or reviewing separately obtained history, documenting clinical information in the electronic or other health record, independently interpreting results and communicating results to the patient/family/caregiver, or care coordinator.

## 2024-01-26 ENCOUNTER — TELEPHONE (OUTPATIENT)
Dept: ENDOCRINOLOGY | Facility: CLINIC | Age: 64
End: 2024-01-26
Payer: MEDICARE

## 2024-01-26 NOTE — TELEPHONE ENCOUNTER
----- Message from Payton Damian NP sent at 1/25/2024  3:25 PM CST -----  Please instruct the patient her Vit D is to high on her current Vit D 3 2000IU daily and I am lowering her to 1000 IU daily. She is to stop 2000 IU for 1 week then start 1000IU. Vit D level increased to 82.1 normal is 30-80.     Also she has low magnesium which can cause symptoms weakness, leg cramps, fatigue, constipation, tremors and headaches. She is to start Magnesium Glycinate 500mg once a day.       Her calcium level and PTH are normal .

## 2024-01-26 NOTE — TELEPHONE ENCOUNTER
Informed the patient her Vit D is to high on her current Vit D 3 2000IU daily and Payton is lowering her to 1000 IU daily. She is to stop 2000 IU for 1 week then start 1000IU. Vit D level increased to 82.1 normal is 30-80.    Also she has low magnesium which can cause symptoms weakness, leg cramps, fatigue, constipation, tremors and headaches. She is to start Magnesium Glycinate 500mg once a day. Pt calcium level and PTH are normal . Pt verbalized understanding.

## 2024-01-29 ENCOUNTER — HOSPITAL ENCOUNTER (OUTPATIENT)
Dept: RADIOLOGY | Facility: HOSPITAL | Age: 64
Discharge: HOME | End: 2024-01-29
Attending: NURSE PRACTITIONER
Payer: MEDICARE

## 2024-01-29 DIAGNOSIS — E21.3 HYPERPARATHYROIDISM: ICD-10-CM

## 2024-01-29 DIAGNOSIS — E21.3 HYPERPARATHYROIDISM: Primary | ICD-10-CM

## 2024-01-29 PROCEDURE — A9500 TC99M SESTAMIBI: HCPCS

## 2024-01-30 ENCOUNTER — TELEPHONE (OUTPATIENT)
Dept: ENDOCRINOLOGY | Facility: CLINIC | Age: 64
End: 2024-01-30
Payer: MEDICARE

## 2024-01-30 NOTE — TELEPHONE ENCOUNTER
----- Message from Payton Damian NP sent at 1/29/2024  3:43 PM CST -----  Please instruct the patient that the NM parathyroid did not show a parathyroid adenoma and I have ordered a CT 4D of parathyroid for further workup.

## 2024-01-30 NOTE — TELEPHONE ENCOUNTER
I called pt and discussed the following results and recommendations:NM parathyroid did not show a parathyroid adenoma and I have ordered a CT 4D of parathyroid for further workup.    Pt verbalizes understanding

## 2024-02-05 LAB — NONINV COLON CA DNA+OCC BLD SCRN STL QL: NEGATIVE

## 2024-02-26 ENCOUNTER — HOSPITAL ENCOUNTER (OUTPATIENT)
Dept: RADIOLOGY | Facility: HOSPITAL | Age: 64
Discharge: HOME | End: 2024-02-26
Attending: NURSE PRACTITIONER
Payer: MEDICARE

## 2024-02-26 DIAGNOSIS — E21.3 HYPERPARATHYROIDISM: ICD-10-CM

## 2024-02-26 LAB
CREAT SERPL-MCNC: 0.8 MG/DL (ref 0.55–1.02)
GFR SERPLBLD CREATININE-BSD FMLA CKD-EPI: >60 MLS/MIN/1.73/M2

## 2024-02-26 PROCEDURE — 82565 ASSAY OF CREATININE: CPT | Performed by: NURSE PRACTITIONER

## 2024-02-26 PROCEDURE — 25500020 PHARM REV CODE 255

## 2024-02-26 PROCEDURE — 70492 CT SFT TSUE NCK W/O & W/DYE: CPT | Mod: TC

## 2024-02-26 RX ADMIN — IOHEXOL 100 ML: 350 INJECTION, SOLUTION INTRAVENOUS at 07:02

## 2024-02-28 ENCOUNTER — OFFICE VISIT (OUTPATIENT)
Dept: OPHTHALMOLOGY | Facility: CLINIC | Age: 64
End: 2024-02-28
Payer: MEDICARE

## 2024-02-28 VITALS — WEIGHT: 156.5 LBS | BODY MASS INDEX: 26.08 KG/M2 | HEIGHT: 65 IN

## 2024-02-28 DIAGNOSIS — H43.822 VITREOMACULAR ADHESION OF LEFT EYE: ICD-10-CM

## 2024-02-28 DIAGNOSIS — H40.053 OCULAR HYPERTENSION, BILATERAL: ICD-10-CM

## 2024-02-28 DIAGNOSIS — E11.3413 SEVERE NONPROLIFERATIVE DIABETIC RETINOPATHY OF BOTH EYES WITH MACULAR EDEMA ASSOCIATED WITH TYPE 2 DIABETES MELLITUS: Primary | ICD-10-CM

## 2024-02-28 DIAGNOSIS — H25.811 COMBINED FORMS OF AGE-RELATED CATARACT OF RIGHT EYE: ICD-10-CM

## 2024-02-28 PROCEDURE — 92133 CPTRZD OPH DX IMG PST SGM ON: CPT | Mod: PBBFAC,PN

## 2024-02-28 PROCEDURE — 92133 CPTRZD OPH DX IMG PST SGM ON: CPT | Mod: PBBFAC,PN | Performed by: OPHTHALMOLOGY

## 2024-02-28 PROCEDURE — 92134 CPTRZ OPH DX IMG PST SGM RTA: CPT | Mod: PBBFAC,PN | Performed by: OPHTHALMOLOGY

## 2024-02-28 PROCEDURE — 99213 OFFICE O/P EST LOW 20 MIN: CPT | Mod: PBBFAC,PN

## 2024-02-28 PROCEDURE — 76514 ECHO EXAM OF EYE THICKNESS: CPT | Mod: PBBFAC,PN

## 2024-02-28 PROCEDURE — 92250 FUNDUS PHOTOGRAPHY W/I&R: CPT | Mod: PBBFAC,PN | Performed by: OPHTHALMOLOGY

## 2024-02-28 RX ORDER — PHENYLEPH/TROPICAMIDE IN WATER 2.5 %-1 %
1 DROPS OPHTHALMIC (EYE) ONCE
Status: COMPLETED | OUTPATIENT
Start: 2024-02-28 | End: 2024-02-28

## 2024-02-28 RX ADMIN — Medication 1 DROP: at 02:02

## 2024-02-28 NOTE — PROGRESS NOTES
HPI    RTC 3-4 wk for CCT and DFE   Last edited by Marie Wills RN on 2/28/2024  2:04 PM.        OCT mac   05/26/22:  OD: normal foveal contour  OS: hard exudates, edema inferotemporally  05/04/22  OD: 227, NFC, no IRF/SRF  OS: 259, NFC, trace IRF, improved  08/26/22  OD: 229, normal foveal contour  OS: 252, trace IRF, stable since May 2022 but improved significantly since 2/2022 12/29/22              OD: 219, NFC no srf/irf, ez intact              OS: 238, PFC, minimal nonCI irf   3/23 OD healthy // OS slight temporal fovea cyst     02/28/24  OD: 222, NFC, no irf/srf, SS 6/10  OS: 229, elevated foveal contour, no IRF/SRF. VMT. SS 8/10    OCTN  03/15/2023  OD: 7/10, 92, all quads green  OS: 10/10, 94, all quads green    02/28/24  OD: 7/10 SS, 90, all quads green  OS:  8/10 SS, 88, all quads green, some thinning on sectoral analysis at 1:00.      Assessment /Plan     For exam results, see Encounter Report.    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 PSK OS  - s/p CEIOL OS 12/1/2020 w IG vs DME  - OCT mac stable  - D/W Dr. Richmond 2/4/22: IVFA consistent with DME more than IG, OCT mac is stable and vision is preserved. Does not recommend avastin at this time unless vision acutely worsens.  - Edema stable off drops, likely NVS.   12/29/22: minimal residual fluid but stable   02/28/24:     2. Severe NPDR OU  -IVFA 7/2021 with MAs, but no NV  Hemoglobin A1c   Date Value Ref Range Status   05/15/2023 8.1 (H) <=7.0 % Final   01/03/2023 7.8 (H) <=7.0 % Final   12/21/2022 7.9 (H) <=7.0 % Final     Fundus photos yearly  Discussed bp/bg management for this stage of retinopathy  - 9/28/23: PT unable to be dilated today, RTC 3-4 wk for repeat exam  - 02/278/24: fundus photos taken, reviewed images with Dr. Monroy. Old LISETTE noted. Multiple DBH, recommend repeat DFE and fundus photos in  four months. Consider IVFA at next visit as some DBH are questionable in appearance.  Retinal detachment and return precautions discussed.     3. NSC OD  -poor dilation  -MRx 6/26/23 BCVA 20/30, pt does not want surgery yet, CTM     4.  Physiologic cupping  5. Occular HTN   - OCTN 02/28/24 full OD, ?Full OS  - TMax 26//27  - CCT not thin. neg FH, + ethnicity   - 02/28/24: IOP good, OCTN with possible mild thinning OS based on sectoral analysis. Will repeat scan at next visit, if consistent thinning will start drops.     6. Vitreomacular traction OS  -  pinhole to 20/20 OS  - discussed with patient  - will continue to monitor given on visual symptoms    RTC 4M, DFE OCT Mac/OCTN or sooner PRN

## 2024-04-01 ENCOUNTER — TELEPHONE (OUTPATIENT)
Dept: INTERNAL MEDICINE | Facility: CLINIC | Age: 64
End: 2024-04-01
Payer: MEDICARE

## 2024-04-01 NOTE — TELEPHONE ENCOUNTER
Pt states that she was suppose to start her Jardiance one week prior to her next visit in July.But states that she started taking it right after her last visit. Pt wants to know what to do.

## 2024-04-02 NOTE — TELEPHONE ENCOUNTER
Called to inform pt about medication pt did not answer. No vm  was set up so I could not leave a message.

## 2024-04-02 NOTE — TELEPHONE ENCOUNTER
Pt called, name and  verified. Pt reported she have never stopped taking her Jardiance and she still is experiencing hypoglycemia some mornings. She have not experience hypoglycemia for 2 consecutive morning through. Please call pt to informed her how to proceed. Thanks

## 2024-04-11 ENCOUNTER — OFFICE VISIT (OUTPATIENT)
Dept: URGENT CARE | Facility: CLINIC | Age: 64
End: 2024-04-11
Payer: MEDICARE

## 2024-04-11 VITALS
DIASTOLIC BLOOD PRESSURE: 81 MMHG | WEIGHT: 151.88 LBS | HEIGHT: 65 IN | SYSTOLIC BLOOD PRESSURE: 171 MMHG | BODY MASS INDEX: 25.3 KG/M2 | RESPIRATION RATE: 20 BRPM | TEMPERATURE: 99 F | HEART RATE: 60 BPM | OXYGEN SATURATION: 98 %

## 2024-04-11 DIAGNOSIS — R05.9 COUGH, UNSPECIFIED TYPE: Primary | ICD-10-CM

## 2024-04-11 PROCEDURE — 99215 OFFICE O/P EST HI 40 MIN: CPT | Mod: PBBFAC | Performed by: FAMILY MEDICINE

## 2024-04-11 PROCEDURE — 99213 OFFICE O/P EST LOW 20 MIN: CPT | Mod: S$PBB,,, | Performed by: FAMILY MEDICINE

## 2024-04-11 RX ORDER — BENZONATATE 100 MG/1
100 CAPSULE ORAL 3 TIMES DAILY PRN
Qty: 40 CAPSULE | Refills: 1 | Status: SHIPPED | OUTPATIENT
Start: 2024-04-11 | End: 2024-04-21

## 2024-04-11 NOTE — PROGRESS NOTES
"Subjective:       Patient ID: Chary Min is a 63 y.o. female.    Vitals:  height is 5' 5" (1.651 m) and weight is 68.9 kg (151 lb 14.4 oz). Her temperature is 98.7 °F (37.1 °C). Her blood pressure is 171/81 (abnormal) and her pulse is 60. Her respiration is 20 and oxygen saturation is 98%.     Chief Complaint: URI (Cough x 2wks off and on)  Patient with possibly recent URI symptoms, most of those have resolved.  Continues with cough.  Minimal sputum production, no fever, no shortness of breath or wheezing, no chest discomfort.  No GI symptoms.  No rash.  Denies history of pulmonary disease.      All other systems are negative    Chart reviewed    Objective:   Physical Exam   Constitutional: She appears well-developed.  Non-toxic appearance. She does not appear ill. No distress.   HENT:   Head: Atraumatic.   Nose: No purulent discharge. Right sinus exhibits no maxillary sinus tenderness and no frontal sinus tenderness. Left sinus exhibits no maxillary sinus tenderness and no frontal sinus tenderness.   Mouth/Throat: Uvula is midline.   Eyes: Right eye exhibits no discharge. Left eye exhibits no discharge. Extraocular movement intact   Neck: Neck supple. No neck rigidity present.   Cardiovascular: Regular rhythm.   Pulmonary/Chest: Effort normal and breath sounds normal. No respiratory distress. She has no wheezes. She has no rales.   Lymphadenopathy:     She has no cervical adenopathy.   Neurological: She is alert.   Skin: Skin is warm, dry and not diaphoretic.   Psychiatric: Her behavior is normal.   Nursing note and vitals reviewed.        Assessment:     1. Cough, unspecified type            Plan:   Discussed what is likely a postviral cough.  No indication for respiratory testing at this point.  She has responded to Tessalon Perles in the past.  Prescribed a few more that she will fill if not cost prohibitive.  Continue Mucinex DM.  Follow-up with PCP or return to urgent care if not improving.  ER " precautions      Cough, unspecified type    Other orders  -     benzonatate (TESSALON) 100 MG capsule; Take 1 capsule (100 mg total) by mouth 3 (three) times daily as needed for Cough.  Dispense: 40 capsule; Refill: 1        Please note: This chart was completed via voice to text dictation. It may contain typographical/word recognition errors. If there are any questions, please contact the provider for final clarification.

## 2024-04-22 ENCOUNTER — TELEPHONE (OUTPATIENT)
Dept: INTERNAL MEDICINE | Facility: CLINIC | Age: 64
End: 2024-04-22
Payer: MEDICARE

## 2024-04-22 NOTE — TELEPHONE ENCOUNTER
"Called patient to discuss hypoglycemia, hypertension, and recent urgent care visit for cough. Reports tessalon perles worked well for her cough.    Patient reports morning glucose is usually 70s to low 100s, denies symptoms at this time. Reports incident "a while back" with a morning glucose in the 60s. Otherwise denies any symptoms of hypoglycemia. Patient will continue to monitor glucose. Advised to discontinue Jardiance and call if she experiences symptomatic hypoglycemia.    Discussed blood pressure. Patient has not checked her blood pressure at home in 3 weeks but states systolic BP often 130s-140s. Patient to monitor blood pressure over the next 2-3 weeks. Will follow up by phone to determine need for medication adjustment.    Shanon Bhagat MD  [facility] Internal Medicine, PRG-2  4/22/2024   "

## 2024-05-08 ENCOUNTER — HOSPITAL ENCOUNTER (OUTPATIENT)
Dept: RADIOLOGY | Facility: HOSPITAL | Age: 64
Discharge: HOME | End: 2024-05-08
Attending: STUDENT IN AN ORGANIZED HEALTH CARE EDUCATION/TRAINING PROGRAM
Payer: MEDICARE

## 2024-05-08 DIAGNOSIS — Z12.31 SCREENING MAMMOGRAM FOR HIGH-RISK PATIENT: ICD-10-CM

## 2024-05-08 DIAGNOSIS — Z12.39 ENCOUNTER FOR BREAST CANCER SCREENING OTHER THAN MAMMOGRAM: ICD-10-CM

## 2024-05-08 PROCEDURE — 77063 BREAST TOMOSYNTHESIS BI: CPT | Mod: 26,,, | Performed by: RADIOLOGY

## 2024-05-08 PROCEDURE — 77067 SCR MAMMO BI INCL CAD: CPT | Mod: 26,,, | Performed by: RADIOLOGY

## 2024-05-08 PROCEDURE — 77063 BREAST TOMOSYNTHESIS BI: CPT | Mod: TC

## 2024-05-16 ENCOUNTER — TELEPHONE (OUTPATIENT)
Dept: INTERNAL MEDICINE | Facility: CLINIC | Age: 64
End: 2024-05-16
Payer: MEDICARE

## 2024-05-16 NOTE — TELEPHONE ENCOUNTER
Attempted to call patient at phone number provided ([phone]) with no answer. The goal of this follow up is to review blood pressure log and make medication adjustments. Will call back to discuss at a later time.    Shanon Bhagat MD  [facility] Internal Medicine, PRG-2  5/16/2024

## 2024-06-10 DIAGNOSIS — Z79.4 TYPE 2 DIABETES MELLITUS WITHOUT COMPLICATION, WITH LONG-TERM CURRENT USE OF INSULIN: Primary | ICD-10-CM

## 2024-06-10 DIAGNOSIS — E11.9 TYPE 2 DIABETES MELLITUS WITHOUT COMPLICATION, WITH LONG-TERM CURRENT USE OF INSULIN: Primary | ICD-10-CM

## 2024-06-10 RX ORDER — CALCIUM CITRATE/VITAMIN D3 200MG-6.25
1 TABLET ORAL 2 TIMES DAILY
Qty: 200 EACH | Refills: 2 | Status: SHIPPED | OUTPATIENT
Start: 2024-06-10

## 2024-06-19 ENCOUNTER — HOSPITAL ENCOUNTER (OUTPATIENT)
Dept: RADIOLOGY | Facility: HOSPITAL | Age: 64
Discharge: HOME | End: 2024-06-19
Attending: STUDENT IN AN ORGANIZED HEALTH CARE EDUCATION/TRAINING PROGRAM
Payer: MEDICARE

## 2024-06-19 DIAGNOSIS — R92.8 ABNORMAL MAMMOGRAM: ICD-10-CM

## 2024-06-19 PROCEDURE — 77061 BREAST TOMOSYNTHESIS UNI: CPT | Mod: TC,RT

## 2024-06-19 PROCEDURE — 76642 ULTRASOUND BREAST LIMITED: CPT | Mod: 26,RT,, | Performed by: RADIOLOGY

## 2024-06-19 PROCEDURE — 76642 ULTRASOUND BREAST LIMITED: CPT | Mod: TC,RT

## 2024-06-19 PROCEDURE — 77061 BREAST TOMOSYNTHESIS UNI: CPT | Mod: 26,RT,, | Performed by: RADIOLOGY

## 2024-06-19 PROCEDURE — 77065 DX MAMMO INCL CAD UNI: CPT | Mod: 26,RT,, | Performed by: RADIOLOGY

## 2024-06-26 ENCOUNTER — LAB VISIT (OUTPATIENT)
Dept: LAB | Facility: HOSPITAL | Age: 64
End: 2024-06-26
Attending: STUDENT IN AN ORGANIZED HEALTH CARE EDUCATION/TRAINING PROGRAM
Payer: MEDICARE

## 2024-06-26 DIAGNOSIS — E78.5 HYPERLIPIDEMIA, UNSPECIFIED HYPERLIPIDEMIA TYPE: ICD-10-CM

## 2024-06-26 DIAGNOSIS — Z79.4 TYPE 2 DIABETES MELLITUS WITHOUT COMPLICATION, WITH LONG-TERM CURRENT USE OF INSULIN: ICD-10-CM

## 2024-06-26 DIAGNOSIS — I10 HYPERTENSION, UNSPECIFIED TYPE: ICD-10-CM

## 2024-06-26 DIAGNOSIS — E11.9 TYPE 2 DIABETES MELLITUS WITHOUT COMPLICATION, WITH LONG-TERM CURRENT USE OF INSULIN: ICD-10-CM

## 2024-06-26 DIAGNOSIS — E55.9 VITAMIN D DEFICIENCY: ICD-10-CM

## 2024-06-26 LAB
25(OH)D3+25(OH)D2 SERPL-MCNC: 42 NG/ML (ref 30–80)
ALBUMIN SERPL-MCNC: 3.8 G/DL (ref 3.4–4.8)
ALBUMIN/GLOB SERPL: 0.9 RATIO (ref 1.1–2)
ALP SERPL-CCNC: 89 UNIT/L (ref 40–150)
ALT SERPL-CCNC: 12 UNIT/L (ref 0–55)
ANION GAP SERPL CALC-SCNC: 9 MEQ/L
AST SERPL-CCNC: 13 UNIT/L (ref 5–34)
BASOPHILS # BLD AUTO: 0.05 X10(3)/MCL
BASOPHILS NFR BLD AUTO: 0.6 %
BILIRUB SERPL-MCNC: 0.3 MG/DL
BUN SERPL-MCNC: 13 MG/DL (ref 9.8–20.1)
CALCIUM SERPL-MCNC: 10.6 MG/DL (ref 8.4–10.2)
CHLORIDE SERPL-SCNC: 107 MMOL/L (ref 98–107)
CO2 SERPL-SCNC: 23 MMOL/L (ref 23–31)
CREAT SERPL-MCNC: 0.89 MG/DL (ref 0.55–1.02)
CREAT/UREA NIT SERPL: 15
EOSINOPHIL # BLD AUTO: 0.2 X10(3)/MCL (ref 0–0.9)
EOSINOPHIL NFR BLD AUTO: 2.5 %
ERYTHROCYTE [DISTWIDTH] IN BLOOD BY AUTOMATED COUNT: 14.1 % (ref 11.5–17)
EST. AVERAGE GLUCOSE BLD GHB EST-MCNC: 148.5 MG/DL
GFR SERPLBLD CREATININE-BSD FMLA CKD-EPI: >60 ML/MIN/1.73/M2
GLOBULIN SER-MCNC: 4.1 GM/DL (ref 2.4–3.5)
GLUCOSE SERPL-MCNC: 162 MG/DL (ref 82–115)
HBA1C MFR BLD: 6.8 %
HCT VFR BLD AUTO: 35.6 % (ref 37–47)
HGB BLD-MCNC: 11.2 G/DL (ref 12–16)
IMM GRANULOCYTES # BLD AUTO: 0.02 X10(3)/MCL (ref 0–0.04)
IMM GRANULOCYTES NFR BLD AUTO: 0.2 %
LYMPHOCYTES # BLD AUTO: 1.8 X10(3)/MCL (ref 0.6–4.6)
LYMPHOCYTES NFR BLD AUTO: 22.3 %
MCH RBC QN AUTO: 26.3 PG (ref 27–31)
MCHC RBC AUTO-ENTMCNC: 31.5 G/DL (ref 33–36)
MCV RBC AUTO: 83.6 FL (ref 80–94)
MONOCYTES # BLD AUTO: 0.9 X10(3)/MCL (ref 0.1–1.3)
MONOCYTES NFR BLD AUTO: 11.2 %
NEUTROPHILS # BLD AUTO: 5.1 X10(3)/MCL (ref 2.1–9.2)
NEUTROPHILS NFR BLD AUTO: 63.2 %
NRBC BLD AUTO-RTO: 0 %
PLATELET # BLD AUTO: 335 X10(3)/MCL (ref 130–400)
PMV BLD AUTO: 9.9 FL (ref 7.4–10.4)
POTASSIUM SERPL-SCNC: 4.3 MMOL/L (ref 3.5–5.1)
PROT SERPL-MCNC: 7.9 GM/DL (ref 5.8–7.6)
RBC # BLD AUTO: 4.26 X10(6)/MCL (ref 4.2–5.4)
SODIUM SERPL-SCNC: 139 MMOL/L (ref 136–145)
WBC # BLD AUTO: 8.07 X10(3)/MCL (ref 4.5–11.5)

## 2024-06-26 PROCEDURE — 83036 HEMOGLOBIN GLYCOSYLATED A1C: CPT

## 2024-06-26 PROCEDURE — 85025 COMPLETE CBC W/AUTO DIFF WBC: CPT

## 2024-06-26 PROCEDURE — 36415 COLL VENOUS BLD VENIPUNCTURE: CPT

## 2024-06-26 PROCEDURE — 82306 VITAMIN D 25 HYDROXY: CPT

## 2024-06-26 PROCEDURE — 80053 COMPREHEN METABOLIC PANEL: CPT

## 2024-07-02 ENCOUNTER — OFFICE VISIT (OUTPATIENT)
Dept: OPHTHALMOLOGY | Facility: CLINIC | Age: 64
End: 2024-07-02
Payer: MEDICARE

## 2024-07-02 VITALS — WEIGHT: 151.88 LBS | HEIGHT: 65 IN | BODY MASS INDEX: 25.3 KG/M2

## 2024-07-02 DIAGNOSIS — H40.053 OCULAR HYPERTENSION, BILATERAL: ICD-10-CM

## 2024-07-02 DIAGNOSIS — E05.90 HYPERTHYROIDISM: ICD-10-CM

## 2024-07-02 DIAGNOSIS — E11.3413 SEVERE NONPROLIFERATIVE DIABETIC RETINOPATHY OF BOTH EYES WITH MACULAR EDEMA ASSOCIATED WITH TYPE 2 DIABETES MELLITUS: Primary | ICD-10-CM

## 2024-07-02 DIAGNOSIS — H43.822 VITREOMACULAR ADHESION OF LEFT EYE: ICD-10-CM

## 2024-07-02 DIAGNOSIS — H25.811 COMBINED FORMS OF AGE-RELATED CATARACT OF RIGHT EYE: ICD-10-CM

## 2024-07-02 PROCEDURE — 92133 CPTRZD OPH DX IMG PST SGM ON: CPT | Mod: PBBFAC,PN | Performed by: OPHTHALMOLOGY

## 2024-07-02 PROCEDURE — 92133 CPTRZD OPH DX IMG PST SGM ON: CPT | Mod: PBBFAC,PN

## 2024-07-02 PROCEDURE — 99213 OFFICE O/P EST LOW 20 MIN: CPT | Mod: PBBFAC,PN

## 2024-07-02 RX ORDER — METHIMAZOLE 5 MG/1
TABLET ORAL
Qty: 30 TABLET | Refills: 0 | Status: SHIPPED | OUTPATIENT
Start: 2024-07-02

## 2024-07-02 NOTE — PROGRESS NOTES
HPI    RTC 4M, DFE OCT Mac/OCTN or sooner PRN  Patient would like to know more about her diagnoses because when she left   her last visit she was confused   Last edited by Eligio Vitale MD on 7/2/2024  3:22 PM.            Assessment /Plan     For exam results, see Encounter Report.    There are no diagnoses linked to this encounter.    HPI    RTC 4M, DFE OCT Mac/OCTN or sooner PRN  Patient would like to know more about her diagnoses because when she left   her last visit she was confused   Last edited by Eligio Vitale MD on 7/2/2024  3:22 PM.        OCT mac   07/02/2024  OD: , intact foveal contour, no IRF/SRF  OS: , VMT, no IRF/SRF  05/26/22:  OD: normal foveal contour  OS: hard exudates, edema inferotemporally  05/04/22  OD: 227, NFC, no IRF/SRF  OS: 259, NFC, trace IRF, improved  08/26/22  OD: 229, normal foveal contour  OS: 252, trace IRF, stable since May 2022 but improved significantly since 2/2022 12/29/22              OD: 219, NFC no srf/irf, ez intact              OS: 238, PFC, minimal nonCI irf   3/23 OD healthy // OS slight temporal fovea cyst     02/28/24  OD: 222, NFC, no irf/srf, SS 6/10  OS: 229, elevated foveal contour, no IRF/SRF. VMT. SS 8/10    OCTN  OCT RNFL 07/02/2024  OD: 76, area of signal dropout inferiorly with RED Inf, rest of quads/clock hours green  OS: 87, all quads/clock hours green  03/15/2023  OD: 7/10, 92, all quads green  OS: 10/10, 94, all quads green  02/28/24  OD: 7/10 SS, 90, all quads green  OS:  8/10 SS, 88, all quads green, some thinning on sectoral analysis at 1:00.        Assessment /Plan     For exam results, see Encounter Report.    There are no diagnoses linked to this encounter.      1. PSK OS  - s/p CEIOL OS 12/1/2020 w IG vs DME  - OCT mac stable  - D/W Dr. Richmond 2/4/22: IVFA consistent with DME more than IG, OCT mac is stable and vision is preserved. Does not recommend avastin at this time unless vision acutely worsens.  - Edema stable off drops,  likely NVS.   12/29/22: minimal residual fluid but stable   7/2/24: no edema on OCT MAC    2. Severe NPDR OU  -IVFA 7/2021 with MAs, but no NV  Hemoglobin A1c   Date Value Ref Range Status   06/26/2024 6.8 <=7.0 % Final   05/15/2023 8.1 (H) <=7.0 % Final   01/03/2023 7.8 (H) <=7.0 % Final     Fundus photos yearly  Discussed bp/bg management for this stage of retinopathy  - 9/28/23: PT unable to be dilated today, RTC 3-4 wk for repeat exam  - 02/28/24: fundus photos taken, reviewed images with Dr. Monroy. Old LISETTE noted. Multiple DBH, recommend repeat DFE and fundus photos in four months. Consider IVFA at next visit as some DBH are questionable in appearance.  Retinal detachment and return precautions discussed.   - 7/2/24: DFE appears stable. Small CWS superior nasal OS (seen in optos) does not appear to be new NV. Will continue to monitor at this time. Q4 month DFE/OCTm/photos    3. NSC OD  -poor dilation  -MRx 6/26/23 BCVA 20/30, pt does not want surgery yet, CTM  - Will be interested at next follow up for Mrx and poss Cat Eval     4.  Physiologic cupping  5. Occular HTN   - OCTN 02/28/24 full OD, ?Full OS  - TMax 26//27  - CCT not thin. neg FH, + ethnicity   - 02/28/24: IOP good, OCTN with possible mild thinning OS based on sectoral analysis. Will repeat scan at next visit, if consistent thinning will start drops.     6. Vitreomacular traction OS  - pinhole to 20/20 OS  - discussed with patient  - will continue to monitor given on visual symptoms    RTC 4M, Mrx, DFE OCT Mac or sooner PRN

## 2024-07-08 ENCOUNTER — OFFICE VISIT (OUTPATIENT)
Dept: INTERNAL MEDICINE | Facility: CLINIC | Age: 64
End: 2024-07-08
Payer: MEDICARE

## 2024-07-08 VITALS
DIASTOLIC BLOOD PRESSURE: 74 MMHG | BODY MASS INDEX: 25.23 KG/M2 | WEIGHT: 151.63 LBS | OXYGEN SATURATION: 100 % | HEART RATE: 67 BPM | TEMPERATURE: 99 F | SYSTOLIC BLOOD PRESSURE: 136 MMHG | RESPIRATION RATE: 18 BRPM

## 2024-07-08 DIAGNOSIS — E11.36 TYPE 2 DIABETES MELLITUS WITH DIABETIC CATARACT, WITH LONG-TERM CURRENT USE OF INSULIN: Primary | ICD-10-CM

## 2024-07-08 DIAGNOSIS — Z79.4 TYPE 2 DIABETES MELLITUS WITH DIABETIC CATARACT, WITH LONG-TERM CURRENT USE OF INSULIN: Primary | ICD-10-CM

## 2024-07-08 DIAGNOSIS — I10 PRIMARY HYPERTENSION: ICD-10-CM

## 2024-07-08 DIAGNOSIS — E78.2 MIXED HYPERLIPIDEMIA: ICD-10-CM

## 2024-07-08 PROCEDURE — 99214 OFFICE O/P EST MOD 30 MIN: CPT | Mod: PBBFAC | Performed by: STUDENT IN AN ORGANIZED HEALTH CARE EDUCATION/TRAINING PROGRAM

## 2024-07-08 RX ORDER — INSULIN DETEMIR 100 [IU]/ML
30 INJECTION, SOLUTION SUBCUTANEOUS NIGHTLY
Qty: 27 ML | Refills: 3 | Status: SHIPPED | OUTPATIENT
Start: 2024-07-08 | End: 2025-07-08

## 2024-07-08 NOTE — PROGRESS NOTES
[facility] INTERNAL MEDICINE  OUTPATIENT OFFICE VISIT NOTE    SUBJECTIVE:      HPI: Chary Min is a 63 y.o. yo female w/ PMH of hypertension, hyperthyroidism, hyperlipidemia, and vitamin D deficiency who presents today for routine follow up.     Today, patient reports blood glucose in the low 60s since starting jardiance, notes symptoms at this time. A1c 6.8 (goal 7-7.5). Will discontinue jardiance. Also requesting 3 month supply of levemir instead of 1 month. Rx adjusted. No additional complaints at this time.    ROS:  (-) Chest pain, palpitations, SOB, fever, night sweats, chills, diarrhea, constipation.       OBJECTIVE:     Vital signs:   /74 (BP Location: Left arm, Patient Position: Sitting, BP Method: Medium (Automatic))   Pulse 67   Temp 98.8 °F (37.1 °C) (Oral)   Resp 18   Wt 68.8 kg (151 lb 9.6 oz)   SpO2 100%   BMI 25.23 kg/m²      Physical Examination:  General: Well nourished w/o distress  HEENT: NC/AT; nasal and oral mucosa moist and clear  Pulm: CTA bilaterally, normal work of breathing  CV: S1, S2 w/o murmurs or gallops  GI: Soft with normal bowel sounds in all quadrants, no masses on palpation  MSK: no lower extremity edema  Derm: No rashes, abnormal bruising, or skin lesions  Neuro: AAOx4  Psych: Cooperative; appropriate mood and affect       ASSESSMENT & PLAN:     Hypertension  - /74 today  - Followed by Cardiology, last seen 7/28/2022: EF 60% with diastolic dysfunction, no changes made to meds  - Previously stopped combo pill since HCTZ might make hypercalcemia worse   - Continue Lisinopril 40 mg daily, Amlodipine 10 mg daily, Metoprolol succinate 25 mg.  - Disontinue jardiance in 7/2024 due to symptomatic hypoglycemia  - Advised to keep a BP log, low salt diet    Uncontrolled type 2 diabetes mellitus with hyperglycemia  -Most recent A1c 6.8; goal 7-7.5  discontinued jardiance due to symptomatic hypoglycemia  - Meds:  metformin 1000mg BID, Tradjenta 5 mg daily; Determir 30u QHS  -  "Previously d/c glimepiride 2mg in 2018. was started on Januvia but she stopped taking on her own because FBG sometimes in 90s. Stopped Tradjenta in 10/2021 on her own because FBG in 80-90s. No episode or sx of hypoglycemia.  - Diabetic foot exam in clinic 1/2024  - Diabetic eye exam -follows optho  - Proteinuria screening- elevated UPC/microalbumin of 42.1 in 1/2022  - Asked to measure and keep a log of her blood sugar levels before breakfast and 2 hrs after largest meal  - Advised on following diabetic diet and daily exercise  - Advised patient on the importance of medication compliance.    Mixed hyperlipidemia  - , HDL 48, , LDL 54 in 1/2024  - Advised on low fat/cholesterol diet  - Continue Atorvastatin 40 mg daily    Nightly wheezing vs stridor  Nightly snoring  - Patient has small oropharynx  - Description sounds as if patient may have MARK  - Referred for sleep study but appt cancelled by patient in 9/2023 as she does not want to have anyone watch her sleep and states she probably would not wear a CPAP device due to discomfort  - She will think about this and discuss at next visit    Hot flashes  - Referred to GYN in 5/2023, referral pending review at this time     Macular edema of left eye  Combined forms of age-related cataract of right eye  - Followed by Ophthalmology with last seen 6/2023  - Will defer management to Ophthalmology  - Per Ophthalmology: "7/2/24: DFE appears stable. Small CWS superior nasal OS (seen in optos) does not appear to be new NV. Will continue to monitor at this time. Q4 month DFE/OCTm/photos"     Hypercalcemia  Hyperparathyroidism  - Found to have elevated calcium and parathyroid hormone (83 in 1/2022; most recent 87.5 in 3/2023).   - Ca 10.6, PTH 87.5, VitD 29.5 in 3/2023  - DEXA 2/2022  The average T score lumbar vertebrae is -1.3 which is within the range of osteopenia. The average T score of the femurs is -0.7 which is within normal range.  - Advised to hydrate well. " "and call back if she gets sx.   - US parathyroid negative for adenoma 4/2023  - Followed by [facility]. Will defer management  - 3/2024: ENT NP: "Please instruct the patient that the CT parathyroid 4D shows possible too small parathyroid adenomas.  Currently her calcium level has decreased.  I will continue to monitor her calcium level and if it increases above 11 or she becomes symptomatic with numbness and tingling to her hands or feet then we will refer her to ENT."     Iron deficiency anemia secondary to inadequate dietary iron intake  - No signs of active bleeding   - FIT neg 5/2021   - Was ordered cologuard but cannot afford it. Will order FIT test.  - Continue iron sulfate 325 mg Monday, Wednesday, Friday     Vitamin D deficiency  - VitD 29.3 in 5/2022  - Previously completed vitD 50,000 units for 8 weeks  - Continue Vit D3 2000 units daily  - Will recheck values before next visit.     Hyperthyroidism  - TSH 3.361, Free T4 1.07, Free T3 3.10 in 1/2024 - all wnl  - Thyroid uptake scan normal, thyrotropin receptor Ab level WNL, unknown etiology  - Denies any symptoms of thyrotoxicosis  - Compliant with meds  - Continue Methimazole 5 mg per Endo  - Followed by [facility]. Will defer management.    Abnormal mammogram  - MMG 5/8/2024: Right Breast: Incomplete - Need Additional Imaging Evaluation (BI-RADS 0); Left Breast: Benign (BI-RADS 2); Additional Right Breast imaging in 6/2024  - US MMG R Breast 6/19/2024: Right Breast: Benign (BI-RADS 2); MMG 7 mm oval mass; US 7x4x7 mm simple cyst/focal cystic ductal dilatation; MMG due 5/2025       Healthcare maintenance  ASCVD risk- 22.8 % . Cont Statin daily.   HM labs (CBC, CMP, FLP, A1c, TSH, vitD): UTD  HIV/Hep panel/syphilis: negative 5/2022  Pneumococcal vaccine: PCV20 8/2023  Tdap: refused   Zoster vaccine: refused  Flu: refused  FIT/colonoscopy: Cologuard Negative 1/26/2024  Lung cancer screening (LDCT age 50-80): NA  AAA screening (men, age 65-75): " NA  Mammogram (after 40): see above  DEXA scan: 2/2022 with osteopenia, rpt in 2-3 yrs   GYN (pap smears): had hysterectomy in past due to menorrhagia, referred to GYN as of 5/15/23       Return to clinic in 6 months.   Urine microalb ordered    Shanon Bhagat MD  [facility]  7/8/2024

## 2024-07-25 ENCOUNTER — OFFICE VISIT (OUTPATIENT)
Dept: CARDIOLOGY | Facility: CLINIC | Age: 64
End: 2024-07-25
Payer: MEDICARE

## 2024-07-25 ENCOUNTER — OFFICE VISIT (OUTPATIENT)
Dept: ENDOCRINOLOGY | Facility: CLINIC | Age: 64
End: 2024-07-25
Payer: MEDICARE

## 2024-07-25 VITALS
BODY MASS INDEX: 25.22 KG/M2 | OXYGEN SATURATION: 100 % | HEIGHT: 65 IN | SYSTOLIC BLOOD PRESSURE: 168 MMHG | RESPIRATION RATE: 20 BRPM | HEART RATE: 60 BPM | DIASTOLIC BLOOD PRESSURE: 72 MMHG | TEMPERATURE: 98 F | WEIGHT: 151.38 LBS

## 2024-07-25 VITALS
HEIGHT: 65 IN | DIASTOLIC BLOOD PRESSURE: 73 MMHG | WEIGHT: 153.25 LBS | HEART RATE: 70 BPM | BODY MASS INDEX: 25.53 KG/M2 | OXYGEN SATURATION: 100 % | TEMPERATURE: 98 F | RESPIRATION RATE: 18 BRPM | SYSTOLIC BLOOD PRESSURE: 181 MMHG

## 2024-07-25 DIAGNOSIS — Z79.4 TYPE 2 DIABETES MELLITUS WITH DIABETIC CATARACT, WITH LONG-TERM CURRENT USE OF INSULIN: ICD-10-CM

## 2024-07-25 DIAGNOSIS — E83.52 HYPERCALCEMIA: ICD-10-CM

## 2024-07-25 DIAGNOSIS — E78.2 MIXED HYPERLIPIDEMIA: ICD-10-CM

## 2024-07-25 DIAGNOSIS — I10 PRIMARY HYPERTENSION: Primary | ICD-10-CM

## 2024-07-25 DIAGNOSIS — E21.3 HYPERPARATHYROIDISM: Primary | ICD-10-CM

## 2024-07-25 DIAGNOSIS — E11.36 TYPE 2 DIABETES MELLITUS WITH DIABETIC CATARACT, WITH LONG-TERM CURRENT USE OF INSULIN: ICD-10-CM

## 2024-07-25 DIAGNOSIS — E55.9 VITAMIN D DEFICIENCY: ICD-10-CM

## 2024-07-25 DIAGNOSIS — E05.90 HYPERTHYROIDISM: ICD-10-CM

## 2024-07-25 DIAGNOSIS — R79.0 LOW MAGNESIUM LEVEL: ICD-10-CM

## 2024-07-25 LAB
OHS QRS DURATION: 82 MS
OHS QTC CALCULATION: 405 MS

## 2024-07-25 PROCEDURE — 93005 ELECTROCARDIOGRAM TRACING: CPT

## 2024-07-25 PROCEDURE — 99215 OFFICE O/P EST HI 40 MIN: CPT | Mod: PBBFAC,27 | Performed by: NURSE PRACTITIONER

## 2024-07-25 PROCEDURE — 3008F BODY MASS INDEX DOCD: CPT | Mod: CPTII,,, | Performed by: NURSE PRACTITIONER

## 2024-07-25 PROCEDURE — 1101F PT FALLS ASSESS-DOCD LE1/YR: CPT | Mod: CPTII,,, | Performed by: NURSE PRACTITIONER

## 2024-07-25 PROCEDURE — 3288F FALL RISK ASSESSMENT DOCD: CPT | Mod: CPTII,,, | Performed by: NURSE PRACTITIONER

## 2024-07-25 PROCEDURE — 3066F NEPHROPATHY DOC TX: CPT | Mod: CPTII,,, | Performed by: NURSE PRACTITIONER

## 2024-07-25 PROCEDURE — 99214 OFFICE O/P EST MOD 30 MIN: CPT | Mod: S$PBB,,, | Performed by: NURSE PRACTITIONER

## 2024-07-25 PROCEDURE — 1159F MED LIST DOCD IN RCRD: CPT | Mod: CPTII,,, | Performed by: NURSE PRACTITIONER

## 2024-07-25 PROCEDURE — 3044F HG A1C LEVEL LT 7.0%: CPT | Mod: CPTII,,, | Performed by: NURSE PRACTITIONER

## 2024-07-25 PROCEDURE — 4010F ACE/ARB THERAPY RXD/TAKEN: CPT | Mod: CPTII,,, | Performed by: NURSE PRACTITIONER

## 2024-07-25 PROCEDURE — 1126F AMNT PAIN NOTED NONE PRSNT: CPT | Mod: CPTII,,, | Performed by: NURSE PRACTITIONER

## 2024-07-25 PROCEDURE — 3077F SYST BP >= 140 MM HG: CPT | Mod: CPTII,,, | Performed by: NURSE PRACTITIONER

## 2024-07-25 PROCEDURE — 3078F DIAST BP <80 MM HG: CPT | Mod: CPTII,,, | Performed by: NURSE PRACTITIONER

## 2024-07-25 PROCEDURE — 3060F POS MICROALBUMINURIA REV: CPT | Mod: CPTII,,, | Performed by: NURSE PRACTITIONER

## 2024-07-25 PROCEDURE — 1160F RVW MEDS BY RX/DR IN RCRD: CPT | Mod: CPTII,,, | Performed by: NURSE PRACTITIONER

## 2024-07-25 PROCEDURE — 99215 OFFICE O/P EST HI 40 MIN: CPT | Mod: PBBFAC,25 | Performed by: NURSE PRACTITIONER

## 2024-07-25 RX ORDER — NIFEDIPINE 60 MG/1
60 TABLET, EXTENDED RELEASE ORAL DAILY
Qty: 30 TABLET | Refills: 11 | Status: SHIPPED | OUTPATIENT
Start: 2024-07-25 | End: 2025-07-25

## 2024-07-25 NOTE — PATIENT INSTRUCTIONS
EKG  Discontinue amlodipine 10 mg. Start nifedipine 60 mg daily for BP better control  NV in 2-3 weeks for BP/HR check   Follow up in cardiology clinic in 6  months or sooner if needed   Follow up with PCP as directed

## 2024-07-25 NOTE — PROGRESS NOTES
CHIEF COMPLAINT:   Chief Complaint   Patient presents with    Follow-up     Denies any cardiac problems                                                  HPI:  Chary Min 64 y.o. female  w/ PMH of hypertension, hyperthyroidism, hyperlipidemia and vitamin D deficiency who presents to cardiology clinic for routine follow up and ongoing care.  Latest Echocardiogram obtained 4.27.22 revealed a preserved EF of 60%.  Most recent ischemic evaluation includes a Treadmill Nuclear Stress Test on 4.25.22 that revealed no reversible ischemia.    The patient presents to clinic today denying any cardiovascular complaints of chest pain, shortness of breath, palpitations, orthopnea, PND, peripheral edema, claudication, lightheadedness, dizziness, near-syncope or syncope.  The patient is able to perform her routine heavy housework, including mopping and sweeping without experiencing any angina or equivalent symptoms.  However, she does not participate in any formal activity or exercise, but states that she does try to remain active throughout her day.  She reports compliance with her current medication but admits that she does consume food items that are high in sodium such as salt occasionally.  Of note, the patient states that she was previously referred for sleep study per her PCP but requested to hold off for now.                                                                                                                                                                                                                                                                                                     CARDIAC TESTING:  Echo 4.27.22  Left ventricular ejection fraction is measured at approximately 60%.         Treadmill Nuclear Stress 4.25.22  No reversible ischemia  No scar                                                                                                                                                   Patient  Active Problem List   Diagnosis    Macular edema of left eye    Combined forms of age-related cataract of right eye    Hypertension    Type 2 diabetes mellitus with diabetic cataract, with long-term current use of insulin    Vitamin D deficiency    Hyperlipidemia    Hyperthyroidism    FLAVIA (iron deficiency anemia)    Hypercalcemia    Systolic murmur     Past Surgical History:   Procedure Laterality Date    BREAST SURGERY      EYE SURGERY      HYSTERECTOMY      TUBAL LIGATION       Social History     Socioeconomic History    Marital status:    Tobacco Use    Smoking status: Former     Current packs/day: 0.00     Types: Cigarettes     Quit date:      Years since quittin.5    Smokeless tobacco: Never   Substance and Sexual Activity    Alcohol use: Not Currently    Drug use: Never    Sexual activity: Yes     Partners: Male     Social Determinants of Health     Financial Resource Strain: Low Risk  (2024)    Overall Financial Resource Strain (CARDIA)     Difficulty of Paying Living Expenses: Not very hard   Food Insecurity: No Food Insecurity (2024)    Hunger Vital Sign     Worried About Running Out of Food in the Last Year: Never true     Ran Out of Food in the Last Year: Never true   Transportation Needs: No Transportation Needs (2024)    PRAPARE - Transportation     Lack of Transportation (Medical): No     Lack of Transportation (Non-Medical): No   Physical Activity: Insufficiently Active (2024)    Exercise Vital Sign     Days of Exercise per Week: 3 days     Minutes of Exercise per Session: 30 min   Stress: No Stress Concern Present (2024)    [employer] - Occupational Stress Questionnaire     Feeling of Stress : Only a little   Housing Stability: Unknown (2024)    Housing Stability Vital Sign     Unable to Pay for Housing in the Last Year: No     Unstable Housing in the Last Year: No        Family History   Problem Relation Name Age of Onset     "Hypertension Mother      Dementia Mother       Review of patient's allergies indicates:  No Known Allergies      ROS:  Review of Systems   Constitutional: Negative.    HENT: Negative.     Eyes: Negative.    Respiratory: Negative.  Negative for shortness of breath.    Cardiovascular: Negative.    Gastrointestinal: Negative.    Genitourinary: Negative.    Musculoskeletal: Negative.    Skin: Negative.    Neurological: Negative.    Endo/Heme/Allergies: Negative.    Psychiatric/Behavioral: Negative.                                                                                                                                                                                  Negative except as stated in the history of present illness. See HPI for details.    PHYSICAL EXAM:  Visit Vitals  BP (!) 168/72 (BP Location: Left arm, Patient Position: Sitting, BP Method: X-Large (Manual))   Pulse 60   Temp 98.3 °F (36.8 °C) (Oral)   Resp 20   Ht 5' 5" (1.651 m)   Wt 68.7 kg (151 lb 6.4 oz)   SpO2 100%   BMI 25.19 kg/m²       Physical Exam  HENT:      Head: Normocephalic.      Nose: Nose normal.   Eyes:      Pupils: Pupils are equal, round, and reactive to light.   Cardiovascular:      Rate and Rhythm: Normal rate and regular rhythm.   Pulmonary:      Effort: Pulmonary effort is normal.   Abdominal:      General: Abdomen is flat. Bowel sounds are normal.      Palpations: Abdomen is soft.   Musculoskeletal:         General: Normal range of motion.      Cervical back: Normal range of motion.   Skin:     General: Skin is warm.   Neurological:      General: No focal deficit present.      Mental Status: She is alert.   Psychiatric:         Mood and Affect: Mood normal.         Current Outpatient Medications   Medication Instructions    amLODIPine (NORVASC) 10 mg, Oral, Daily    atorvastatin (LIPITOR) 40 mg, Oral, Daily    cholecalciferol (vitamin D3) (VITAMIN D3) 1,000 Units, Oral, Daily    FeroSuL 325 mg, Oral, Every other day    " "fluticasone propionate (FLONASE) 100 mcg, Each Nostril, Daily    ketorolac 0.4% (ACULAR) 0.4 % Drop PLACE 1 DROP IN THE LEFT EYE FOUR TIMES A DAY    LEVEMIR FLEXTOUCH U100 INSULIN 30 Units, Subcutaneous, Nightly    lisinopriL (PRINIVIL,ZESTRIL) 40 mg, Oral, Daily    magnesium gluconate 27.5 mg magne- sium (500 mg) Tab 500 mg, Oral, Daily    metFORMIN (GLUCOPHAGE) 1,000 mg, Oral, 2 times daily    methIMAzole (TAPAZOLE) 5 MG Tab TAKE 1/2 TABLET BY MOUTH DAILY    metoprolol succinate (TOPROL-XL) 25 mg, Oral, Daily    PRED FORTE 1 % DrpS INSTILL 1 DROP IN THE LEFT EYE THREE TIMES A DAY    TECHLITE PEN NEEDLE 32 gauge x 5/32" Ndle USE ONE DAILY    TRADJENTA 5 mg, Oral, Daily    TRUE METRIX GLUCOSE METER Misc use as directed    TRUE METRIX GLUCOSE TEST STRIP Strp 1 each, skin prick, 2 times daily, Use to check blood glucose        All medications, laboratory studies, cardiac diagnostic imaging reviewed.     Lab Results   Component Value Date    LDL 54.00 01/25/2024    LDL 70.00 12/21/2022    TRIG 161 (H) 01/25/2024    TRIG 85 12/21/2022    CREATININE 0.89 06/26/2024    MG 1.50 (L) 01/25/2024    K 4.3 06/26/2024        ASSESSMENT/PLAN:  Primary hypertension  - BP persistently above goal  - EF 60% with diastolic dysfunction.  Patient needs better BP control  - Continue Lisinopril 40 mg, Metoprolol Succinate 25 MG.  Discontinued amlodipine 10 mg.  Start nifedipine 60 mg for better BP control.  - NV in 2-3 weeks for BP/HR check.  If remains above goal can further up titrate nifedipine   - Lengthy discussion with the patient regarding importance of low-salt diet and increased exercise as tolerated.  - EKG today - Sinus Bradycardia- HR -54      Mixed hyperlipidemia  - LDL 54- at goal   - Continue Atorvastatin 40 mg daily  - Counseled on low-cholesterol, heart healthy diet and exercise as tolerated     Type 2 Diabetes mellitus (uncontrolled)  - A1C -6.8 -  improved  - Management per PCP  - Patient previously on Jardiance but was " discontinued.         EKG  Discontinue amlodipine 10 mg. Start nifedipine 60 mg daily for BP better control  NV in 2-3 weeks for BP/HR check   Follow up in cardiology clinic in 6  months or sooner if needed   Follow up with PCP as directed

## 2024-07-25 NOTE — PROGRESS NOTES
Subjective     Patient ID: Chary Min is a 64 y.o. female.    Chief Complaint: Follow-up (6 mth follow-up, hyperparathyroidism )    09/15/2022 endocrine clinic note:  62-year-old female scheduled today as new patient referral to endocrine clinic for hypercalcemia, hyperparathyroidism and vitamin-D deficiency.  Patient had previous workup in 2015 ultrasound March 11, 2015 of thyroid IMPRESSION: Heterogeneous appearing thyroid without discrete nodule identified.  Patient had NM parathyroid on 12/10/2015 IMPRESSION: Normal radioiodine thyroid uptake and scan.  Patient later had bone density 12/10/2015 IMPRESSION: 1. Bone density of the lumbar vertebrae consistent with osteopenia. 2. Normal bone mineral density of the femurs. Calcium level 10.5 on 05/16/2022.  On previous lab and 7 months ago vitamin-D was corrected to 39 with parathyroid at 83.8.  Previously patient had vitamin-D deficiency with vitamin-D level 9.6 on 06/12/2020 since she states she is been on different doses of vitamin-D and is now currently on over-the-counter vitamin-D 1000 IU daily.  Calcium level has increased over the last year and fluctuated.  Patient denies any fractures or kidney stones.  Denies taking any calcium supplementation or taking over-the-counter stomach medicine such as Tums.     Endocrine clinic note 03/15/2023:  62 year female scheduled today for endocrine clinic follow-up.  History of hyperparathyroidism, hypercalcemia with vitamin-D deficiency and hyper thyroidism. Calcium level 10.5 on 05/16/2022 decreased 7.3 on 09/15/2022.  Recent .3, previous PTH corrected to 83.8 with corrected vitamin-D of 39 on 01/27/2022.  Patient denies any kidney stones or fractures.  She denies any symptoms of hypercalcemia she denies any tingling or dystonia.  Hyperthyroidism patient is currently on  mg b.i.d. TSH 4.267, free T3 3.01, free T4 0.93 on 01/03/2023.  Patient is asking about being placed on methimazole for she can be on  less tablets per day.  Instructed patient repeat her labs today and adjust medication change PTU the methimazole patient denies any symptoms of hyperthyroidism she denies any weight gain or loss, fatigue, tremors, palpitations or anxiety.      Endocrine clinic note 01/25/2024:  63 year female scheduled today for endocrine clinic follow-up.  History of hyperparathyroidism, hypercalcemia, and vitamin-D deficiency with hyperthyroidism. Hyperparathyroidism Calcium level 10.6 on 05/15/2023, Recent , previous PTH corrected to 83.8 with corrected vitamin-D of 39. 24 hour calcium and creatinine normal 03/15/2023, US thyroid No adenoma seen 01/25/2023. NM Parathyroid ordered today.  Patient reports occasionally tingling to her hands she denies any constipation she denies kidney stones or fractures since her previous visit.  Hyperthyroidism patient was DC off PTU and started on methimazole low-dose currently on methimazole 5 mg TSH 3.361, Free T4 1.07, Free T3 3.10 on 01/17/2024.  We will reduce methimazole to half a tablet a day.     Endocrine clinic note 07/25/2024:  64-year-old female scheduled today for endocrine clinic follow-up. History of hyperparathyroidism, hypercalcemia, and vitamin-D deficiency with hyperthyroidism. Hyperparathyroidism/ hypercalcemia Calcium level 10.6 on 6/26/2024  Recent , previous PTH corrected to 83.8 w/ corrected vitamin-D of 39,  With elevated vitamin-D level PTH returned to 66.1 on 01/25/2024, 24 hour calcium and creatinine normal 03/15/2023. US thyroid No adenoma seen 01/25/2023, NM Parathyroid 01/29/2024 no parathyroid adenoma seen, CT neck 4D 02/26/2024: Linear nodules posterior to the left thyroid lobe and inferior to the right thyroid lobe are indeterminate, with small parathyroid adenomas possible.  Calcium level has remained below 11 patient is currently asymptomatic discussed with the patient we will continue to monitor.  Patient had low magnesium level of 1.50 on  01/25/2024 and was started on over-the-counter magnesium.  We will repeat her level today.  Hyperthyroidism pt is on  MMI 2.5 mg daily, Reduce methimazole to 2.5 mg per day with TSH 3.361, Free T4 1.07, Free T3 3.10 on 01/17/2024.  Patient denies any weight loss, tremors, anxiety, diarrhea or heat intolerance.  will repeat labs today.      * Final Report *     Reason For Exam  Hyperthyroidism     Radiology Report  Ultrasound March 11, 2015     INDICATION: Hyperthyroidism     Technique: The right and left lobes of the thyroid are normal in size  measuring 4.1 x 1.5 x 1.7 cm and 4.0 x 1.2 x 1.3 cm. Echotexture is  somewhat heterogeneous. The isthmus is within normal limits at 3 mm.  No solid or cystic nodule identified. Vascularity is symmetric and  within normal limits. There is no cervical adenopathy.     IMPRESSION: Heterogeneous appearing thyroid without discrete nodule  identified.     Signature Line  Electronically Signed By: Clark Quintero MD  Date/Time Signed: 03/11/2015 09:30        This document has an image     Result type:     US Thyroid  Result date:     March 11, 2015 9:27 CDT  Result status:  Auth (Verified)  Result title:      US Thyroid  Performed by: Clark Quintero MD on March 11, 2015 9:30 CDT  Verified by:      Clark Quintero MD on March 11, 2015 9:30 CDT  Encounter info:            [id number], [facility], Outpatient, 3/11/2015 - 3/11/2015     Details     Reading Physician      Reading Date  Result Priority  IN REPORT, PROVIDER       12/10/2015           Narrative & Impression  Thyroid radioiodine uptake and scan     INDICATION:Thyrotoxicosis, unspecified without thyrotoxic crisis or  storm     FINDINGS:      Routine 24 hour radioiodine thyroid uptake and scanning was performed  after oral and administration of 287 uCi of Iodine-123 sodium iodide.  Oblique views are included.     There is homogeneous tracer distribution throughout both thyroid  lobes. No photopenic or focal intense  foci.     24 hour radioiodine thyroid uptake measures 23% (normal is 15 to 30%  ).     IMPRESSION: Normal radioiodine thyroid uptake and scan.      Electronically Signed By: Scot Vargas MD  Date/Time Signed: 12/10/2015 10:53        Narrative & Impression     CLINICAL: Osteoporosis     TECHNIQUE: Standard bone mineral density was performed of the lumbar  vertebrae and the femurs.     FINDINGS:  The average T score lumbar vertebrae is -1.3 which is  within the range of osteopenia.     The average T score of the femurs is -0.7 which is within normal  range.     IMPRESSION:     1. Bone density of the lumbar vertebrae consistent with osteopenia.  2. Normal bone mineral density of the femurs.  Electronically Signed By: Michael Byrd MD  Date/Time Signed: 02/10/2022 08:47      NM Parathyroid Scan with SPECT Routine  Order: [id number]  Status: Final result       Visible to patient: No (inaccessible in MyChart)       Next appt: Today at 11:15 AM in Cardiology ([facility])       Dx: Hyperparathyroidism    2 Result Notes       1 Follow-up Encounter  Details      Reading Physician Reading Date Result Priority  Michael Byrd MD  [phone] 1/29/2024 Routine    Narrative & Impression  EXAMINATION:  NM PARATHYROID SCAN WITH SPECT ROUTINE     CLINICAL HISTORY:  Hyperparathyroidism, unspecified     TECHNIQUE:  Dual phase parathyroid scintigraphy was performed following intravenous administration of 26.6 mCi Technetium-99m Sestamibi. Anterior projection early scan was performed at 15 minutes and delayed scans were obtained at 2  and 4 hours.  SPECT transverse, sagittal coronal images were supplemented.     COMPARISON:  Ultrasound thyroid glands December 10, 2015.  No recent exams available.     FINDINGS:  Scintigraphy performed at 30 minutes shows symmetrical homogenous sestamibi activity within the thyroid glands. Delayed scans show slow symmetrical washout of sestamibi from the thyroid glands. There was no  differential clearance with focal asymmetric delayed retention to suggest parathyroid adenoma. Detection of parathyroid hyperplasia is not accurate or sensitive with this scan.     Impression:     No suggestion of parathyroid adenoma with sestamibi scan.        Electronically signed by:Michael Byrd  Date:                                            01/29/2024  Time:                                           15:32        Exam Ended: 01/29/24 12:25 CST Last Resulted: 01/29/24 15:32 CST    CT Neck Parathyroid (4D)  Order: [id number] - Reflex for Order [id number]  Status: Final result       Visible to patient: No (inaccessible in MyChart)       Next appt: Today at 11:15 AM in Cardiology ([facility])       Dx: Hyperparathyroidism    3 Result Notes  Details      Reading Physician Reading Date Result Priority  Sahara Vo MD  [phone] 3/1/2024 Routine    Narrative & Impression  EXAMINATION:  CT NECK PARATHYROID (4D)     CLINICAL HISTORY:  Hyperparathyroidism, unspecified Hyperparathyroidism/hypercalcemia;     TECHNIQUE:  Axial CT images of the neck were obtained before and after intravenous contrast. Reformatted images in the sagittal and coronal plane were included.     Automatic exposure control was utilized to limit radiation dose.     DLP: 1652 mGy-cm     COMPARISON:  Nuclear medicine exam dated 01/29/2024     FINDINGS:  There is linear enhancement posterior to the left thyroid lobe measuring 3 mm in size, which appears to washout on delayed imaging (series 4, image 129).  There is a linear density inferior to the right thyroid lobe measuring 6 mm, which also appears to washout on delayed imaging (series 8, image 34).     The airways are patent.  There is no cervical lymphadenopathy.  The parotid glands and submandibular glands unremarkable.  Subcentimeter thyroid nodules are noted.  The major vessels in the neck are patent, with acid changes at the carotid bulbs.  There is no acute abnormality  in visualized portions of brain parenchyma.  There is no definite destructive bone lesion.  There are mild emphysematous changes in the lung apices.     Impression:     Linear nodules posterior to the left thyroid lobe and inferior to the right thyroid lobe are indeterminate, with small parathyroid adenomas possible.        Electronically signed by:Sahara Vo  Date:                                            03/01/2024  Time:                                           09:18        Exam Ended: 02/26/24 09:30 CST Last Resulted: 03/01/24 09:18 CST                          Review of Systems   Constitutional:  Negative for activity change, appetite change and fatigue.   HENT:  Negative for dental problem, hearing loss, tinnitus, trouble swallowing and goiter.    Eyes:  Negative for photophobia, pain and visual disturbance.   Respiratory:  Negative for cough, chest tightness and wheezing.    Cardiovascular:  Negative for chest pain, palpitations and leg swelling.   Gastrointestinal:  Negative for abdominal pain, constipation, diarrhea, nausea and reflux.   Endocrine: Negative for cold intolerance, heat intolerance, polydipsia and polyphagia.   Genitourinary:  Negative for difficulty urinating, flank pain, hematuria, hot flashes, menstrual irregularity, menstrual problem, nocturia and urgency.   Musculoskeletal:  Negative for back pain, gait problem, joint swelling, leg pain and joint deformity.   Integumentary:  Negative for color change, pallor, rash and breast discharge.   Allergic/Immunologic: Negative for environmental allergies, food allergies and immunocompromised state.   Neurological:  Negative for tremors, seizures, headaches, memory loss and coordination difficulties.   Psychiatric/Behavioral:  Negative for agitation, behavioral problems and sleep disturbance. The patient is not nervous/anxious.           Objective     Physical Exam  Constitutional:       General: She is not in acute distress.      Appearance: Normal appearance. She is not ill-appearing.   HENT:      Head: Normocephalic and atraumatic.      Right Ear: External ear normal.      Left Ear: External ear normal.      Nose: Nose normal. No congestion or rhinorrhea.      Mouth/Throat:      Mouth: Mucous membranes are moist.      Pharynx: Oropharynx is clear. No oropharyngeal exudate.   Eyes:      General:         Right eye: No discharge.         Left eye: No discharge.      Conjunctiva/sclera: Conjunctivae normal.      Pupils: Pupils are equal, round, and reactive to light.   Neck:      Thyroid: No thyroid mass, thyromegaly or thyroid tenderness.   Cardiovascular:      Rate and Rhythm: Normal rate and regular rhythm.      Pulses: Normal pulses.      Heart sounds: Normal heart sounds. No murmur heard.  Pulmonary:      Effort: Pulmonary effort is normal. No respiratory distress.      Breath sounds: Normal breath sounds.   Abdominal:      General: Abdomen is flat. Bowel sounds are normal. There is no distension.      Palpations: Abdomen is soft.      Tenderness: There is no abdominal tenderness.   Musculoskeletal:         General: No swelling or tenderness. Normal range of motion.      Cervical back: Normal range of motion and neck supple. No tenderness.      Right lower leg: No edema.      Left lower leg: No edema.   Feet:      Right foot:      Skin integrity: Skin integrity normal.      Left foot:      Skin integrity: Skin integrity normal.   Lymphadenopathy:      Cervical: No cervical adenopathy.   Skin:     General: Skin is warm and dry.      Coloration: Skin is not jaundiced or pale.   Neurological:      General: No focal deficit present.      Mental Status: She is alert and oriented to person, place, and time. Mental status is at baseline.      Coordination: Coordination normal.      Gait: Gait normal.   Psychiatric:         Mood and Affect: Mood normal.         Behavior: Behavior normal.         Thought Content: Thought content normal.             Assessment and Plan     1. Hyperparathyroidism  Calcium level 10.6 on 6/26/2024  Recent , previous PTH corrected to 83.8 w/ corrected vitamin-D of 39   24 hour calcium and creatinine normal 03/15/2023  US thyroid No adenoma seen 01/25/2023   NM Parathyroid 01/29/2024 no parathyroid adenoma seen  CT neck 4D 02/26/2024: Linear nodules posterior to the left thyroid lobe and inferior to the right thyroid lobe are indeterminate, with small parathyroid adenomas possible             Component Ref Range & Units 4 wk ago  (6/26/24) 5 mo ago  (2/26/24) 6 mo ago  (1/25/24) 1 yr ago  (5/15/23) 1 yr ago  (3/15/23) 1 yr ago  (9/15/22) 2 yr ago  (5/16/22)   Sodium 136 - 145 mmol/L 139  141 138 138 137 137   Potassium 3.5 - 5.1 mmol/L 4.3  3.7 4.3 4.5 4.3 4.2   Chloride 98 - 107 mmol/L 107  107 104 104 102 102   CO2 23 - 31 mmol/L 23  24 24 24 25 25   Glucose 82 - 115 mg/dL 162 High   158 High  159 High  125 High  107 133 High    Blood Urea Nitrogen 9.8 - 20.1 mg/dL 13.0  19.9 12.5 16.6 17.8 18.4   Creatinine 0.55 - 1.02 mg/dL 0.89 0.80 0.99 0.78 0.73 0.84 0.75   Calcium 8.4 - 10.2 mg/dL 10.6 High   10.2 10.6 High  10.6 High  10.3 High  10.5 High                  Component Ref Range & Units 6 mo ago  (1/25/24) 1 yr ago  (3/15/23) 1 yr ago  (9/15/22) 2 yr ago  (5/16/22) 2 yr ago  (1/27/22)   PARATHYROID HORMONE INTACT 8.7 - 77.0 pg/mL 66.1 87.5 High  121.3 High  111.0 High  83.8 R      -     Renal Function Panel; Future; Expected date: 07/25/2024  -     Vitamin D; Future; Expected date: 07/25/2024  -     PTH, Intact; Future; Expected date: 07/25/2024  -     Magnesium; Future; Expected date: 07/25/2024    2. Hypercalcemia  See above     3. Vitamin D deficiency  Vitamin-D over corrected 82.1 on 01/25/2024 previously 29.5 on 03/15/2023  With elevated vitamin-D level PTH returned to 66.1 on 01/25/2024             Component Ref Range & Units 6 mo ago  (1/25/24) 1 yr ago  (3/15/23) 1 yr ago  (9/15/22) 2 yr ago  (5/16/22) 2 yr  ago  (1/27/22) 2 yr ago  (10/5/21) 3 yr ago  (4/12/21)   Vitamin D 30.0 - 80.0 ng/mL 82.1 High  29.5 Low  27.0 Low  29.3 Low  39.0 R 38.8 34.5      -     Vitamin D; Future; Expected date: 07/25/2024    4. Low magnesium level  Magnesium level 1.50 on 01/25/2024        Component Ref Range & Units 6 mo ago 1 yr ago   Magnesium Level 1.60 - 2.60 mg/dL 1.50 Low  1.60      -     Magnesium; Future; Expected date: 07/25/2024    5. Hyperthyroidism  On MMI 2.5 mg daily    Reduce methimazole to 2.5 mg per day  TSH 3.361, Free T4 1.07, Free T3 3.10 on 01/17/2024 (MMI reduced to half a tablet at this time)             Component Ref Range & Units 6 mo ago  (1/17/24) 1 yr ago  (4/12/23) 1 yr ago  (3/15/23) 1 yr ago  (1/3/23) 1 yr ago  (12/21/22) 2 yr ago  (5/16/22) 2 yr ago  (1/27/22)   TSH 0.350 - 4.940 uIU/mL 3.361 3.250 3.884 4.267 3.321 1.7583 R 1.1330 R        Component Ref Range & Units 6 mo ago  (1/17/24) 1 yr ago  (4/12/23) 1 yr ago  (3/15/23) 1 yr ago  (1/3/23) 1 yr ago  (12/21/22) 2 yr ago  (5/16/22) 3 yr ago  (4/12/21)   T3 Free 1.58 - 3.91 pg/mL 3.10 2.68 R 2.79 R 3.01 R 4.25 High  R 2.85 R 3.54 R                Component Ref Range & Units 6 mo ago  (1/17/24) 1 yr ago  (4/12/23) 1 yr ago  (3/15/23) 1 yr ago  (12/21/22) 2 yr ago  (5/16/22) 3 yr ago  (4/12/21) 5 yr ago  (5/1/19)   Thyroxine Free 0.70 - 1.48 ng/dL 1.07 0.89 0.81 0.93 0.91 0.79 0.96 R      -     T4, Free; Future; Expected date: 07/25/2024  -     T3, Free (OLG); Future; Expected date: 07/25/2024  -     TSH; Future; Expected date: 07/25/2024         Follow up in about 6 months (around 1/25/2025) for Hyperthyroidism, Hyperparathyroidism.

## 2024-07-29 ENCOUNTER — LAB VISIT (OUTPATIENT)
Dept: LAB | Facility: HOSPITAL | Age: 64
End: 2024-07-29
Attending: NURSE PRACTITIONER
Payer: MEDICARE

## 2024-07-29 DIAGNOSIS — E55.9 VITAMIN D DEFICIENCY: ICD-10-CM

## 2024-07-29 DIAGNOSIS — E21.3 HYPERPARATHYROIDISM: ICD-10-CM

## 2024-07-29 DIAGNOSIS — R79.0 LOW MAGNESIUM LEVEL: ICD-10-CM

## 2024-07-29 DIAGNOSIS — E05.90 HYPERTHYROIDISM: ICD-10-CM

## 2024-07-29 LAB
25(OH)D3+25(OH)D2 SERPL-MCNC: 37 NG/ML (ref 30–80)
ALBUMIN SERPL-MCNC: 3.7 G/DL (ref 3.4–4.8)
BUN SERPL-MCNC: 16.9 MG/DL (ref 9.8–20.1)
CALCIUM SERPL-MCNC: 10.8 MG/DL (ref 8.4–10.2)
CHLORIDE SERPL-SCNC: 106 MMOL/L (ref 98–107)
CO2 SERPL-SCNC: 25 MMOL/L (ref 23–31)
CREAT SERPL-MCNC: 0.89 MG/DL (ref 0.55–1.02)
GFR SERPLBLD CREATININE-BSD FMLA CKD-EPI: >60 ML/MIN/1.73/M2
GLUCOSE SERPL-MCNC: 181 MG/DL (ref 82–115)
MAGNESIUM SERPL-MCNC: 1.9 MG/DL (ref 1.6–2.6)
PHOSPHATE SERPL-MCNC: 3.6 MG/DL (ref 2.3–4.7)
POTASSIUM SERPL-SCNC: 4.6 MMOL/L (ref 3.5–5.1)
PTH-INTACT SERPL-MCNC: 74.8 PG/ML (ref 8.7–77)
SODIUM SERPL-SCNC: 139 MMOL/L (ref 136–145)
T3FREE SERPL-MCNC: 3.06 PG/ML (ref 1.58–3.91)
T4 FREE SERPL-MCNC: 1.07 NG/DL (ref 0.7–1.48)
TSH SERPL-ACNC: 1.85 UIU/ML (ref 0.35–4.94)

## 2024-07-29 PROCEDURE — 36415 COLL VENOUS BLD VENIPUNCTURE: CPT

## 2024-07-29 PROCEDURE — 84481 FREE ASSAY (FT-3): CPT

## 2024-07-29 PROCEDURE — 84439 ASSAY OF FREE THYROXINE: CPT

## 2024-07-29 PROCEDURE — 84443 ASSAY THYROID STIM HORMONE: CPT

## 2024-07-29 PROCEDURE — 83735 ASSAY OF MAGNESIUM: CPT

## 2024-07-29 PROCEDURE — 82306 VITAMIN D 25 HYDROXY: CPT

## 2024-07-29 PROCEDURE — 80069 RENAL FUNCTION PANEL: CPT

## 2024-07-29 PROCEDURE — 83970 ASSAY OF PARATHORMONE: CPT

## 2024-07-30 ENCOUNTER — TELEPHONE (OUTPATIENT)
Dept: ENDOCRINOLOGY | Facility: CLINIC | Age: 64
End: 2024-07-30
Payer: MEDICARE

## 2024-07-30 NOTE — TELEPHONE ENCOUNTER
----- Message from Payton Damian NP sent at 7/30/2024  3:43 PM CDT -----  Please let the patient know that her current thyroid functions are normal, vitamin-D level is normal, parathyroid hormone has returned to normal and magnesium level is normal.  Please instruct her that her calcium is further elevated at 10.8 from previous 10.6 and she is to ensure she was staying hydrated and also avoiding calcium supplements or high calcium foods on a regular basis

## 2024-07-31 NOTE — TELEPHONE ENCOUNTER
Spoke with patient concerning labs and recommendation from SONIA Cain , patient voiced understanding.

## 2024-08-14 ENCOUNTER — TELEPHONE (OUTPATIENT)
Dept: CARDIOLOGY | Facility: CLINIC | Age: 64
End: 2024-08-14
Payer: MEDICARE

## 2024-08-14 DIAGNOSIS — I10 PRIMARY HYPERTENSION: Primary | ICD-10-CM

## 2024-08-14 RX ORDER — NIFEDIPINE 30 MG/1
30 TABLET, EXTENDED RELEASE ORAL DAILY
COMMUNITY
End: 2024-08-14

## 2024-08-14 RX ORDER — NIFEDIPINE 30 MG/1
30 TABLET, EXTENDED RELEASE ORAL DAILY
Qty: 30 TABLET | Refills: 6 | Status: SHIPPED | OUTPATIENT
Start: 2024-08-14

## 2024-08-14 NOTE — TELEPHONE ENCOUNTER
----- Message from [facility] sent at 8/14/2024  3:27 PM CDT -----  Please advise patient to attempt to take nifedipine but at a lower dose of 30 mg once a day. The 60 mg moderate dose may have been too much for the patient's blood pressure.  However, considering that her blood pressure remains elevated would recommend that she resume nifedipine at 30 mg and repeat a Nurse visit in 2-3 weeks for BP check.  Also please reiterate importance of a low-sodium diet and exercise as tolerated.    Thank you   Magdalene HENDRIX- BC  ----- Message -----  From: Alie Casillas LPN  Sent: 8/14/2024   2:01 PM CDT  To: SIME[facility]    Patient was suppose to have nurse visit tomorrow. Last visit you discontinued amlodipine 10 mg. Started nifedipine 60 mg daily for BP better control. She reports when she took the nifedipine w/ her lisinopril her bp dropped to 93/73. She stopped taking her nifedipine and resumed her amlodipine on her own. Her bp is now running 140-150/70s. She cannot make it to nurse visit tomorrow.

## 2024-08-21 DIAGNOSIS — E05.90 HYPERTHYROIDISM: ICD-10-CM

## 2024-08-21 RX ORDER — METHIMAZOLE 5 MG/1
TABLET ORAL
Qty: 30 TABLET | Refills: 2 | Status: SHIPPED | OUTPATIENT
Start: 2024-08-21

## 2024-10-07 ENCOUNTER — CLINICAL SUPPORT (OUTPATIENT)
Dept: CARDIOLOGY | Facility: CLINIC | Age: 64
End: 2024-10-07
Payer: MEDICARE

## 2024-10-07 VITALS
TEMPERATURE: 98 F | OXYGEN SATURATION: 100 % | HEIGHT: 65 IN | RESPIRATION RATE: 20 BRPM | HEART RATE: 74 BPM | WEIGHT: 152.81 LBS | BODY MASS INDEX: 25.46 KG/M2 | DIASTOLIC BLOOD PRESSURE: 74 MMHG | SYSTOLIC BLOOD PRESSURE: 172 MMHG

## 2024-10-07 DIAGNOSIS — I10 PRIMARY HYPERTENSION: Primary | ICD-10-CM

## 2024-10-07 PROCEDURE — 99211 OFF/OP EST MAY X REQ PHY/QHP: CPT | Mod: S$PBB,,, | Performed by: NURSE PRACTITIONER

## 2024-10-07 PROCEDURE — 99215 OFFICE O/P EST HI 40 MIN: CPT | Mod: PBBFAC

## 2024-10-07 RX ORDER — NIFEDIPINE 60 MG/1
60 TABLET, EXTENDED RELEASE ORAL DAILY
COMMUNITY
Start: 2024-09-18

## 2024-10-07 NOTE — PATIENT INSTRUCTIONS
HERE FOR B/P CHECK 172/74 MANUAL LEFT HR----74  JUST TOOK MEDS 20 MINUTES AGO CAME HERE BY MISTAKE  DENIES ANY OTHER CARDIAC PROBLEMS  PER NP ANDREW TO BRING PT BACK FOR REPEAT CHECK  WHEN SHE HAS TAKEN MEDS 2 HOURS PRIOR   NO MED CHANGE TODAY  STATES DOING LOW SODIUM DIET AND LOGS  INSTRUCTED TO TAKE ALL MEDS IN AM AND MONITOR B/P  MORNING AND AFTERNOON  VERBALIZED UNDERSTANDING  ED PRECAUTIONS GIVEN

## 2024-11-07 ENCOUNTER — OFFICE VISIT (OUTPATIENT)
Dept: OPHTHALMOLOGY | Facility: CLINIC | Age: 64
End: 2024-11-07
Payer: MEDICARE

## 2024-11-07 VITALS — BODY MASS INDEX: 25.34 KG/M2 | HEIGHT: 65 IN | WEIGHT: 152.13 LBS

## 2024-11-07 DIAGNOSIS — E11.3413 SEVERE NONPROLIFERATIVE DIABETIC RETINOPATHY OF BOTH EYES WITH MACULAR EDEMA ASSOCIATED WITH TYPE 2 DIABETES MELLITUS: Primary | ICD-10-CM

## 2024-11-07 DIAGNOSIS — H40.053 OCULAR HYPERTENSION, BILATERAL: ICD-10-CM

## 2024-11-07 DIAGNOSIS — H43.822 VITREOMACULAR ADHESION OF LEFT EYE: ICD-10-CM

## 2024-11-07 DIAGNOSIS — H25.811 COMBINED FORMS OF AGE-RELATED CATARACT OF RIGHT EYE: ICD-10-CM

## 2024-11-07 PROCEDURE — 99213 OFFICE O/P EST LOW 20 MIN: CPT | Mod: PBBFAC,PN

## 2024-11-07 PROCEDURE — 92133 CPTRZD OPH DX IMG PST SGM ON: CPT | Mod: PBBFAC,PN

## 2024-11-07 NOTE — PROGRESS NOTES
HPI    RTC 4M, Mrx, DFE OCT Mac     C/O new onset  pain OS , sharp pain last about 10 minutes, only at night,   a few nights a week, started a few weeks ago  Last edited by Marie Wills RN on 11/7/2024  2:48 PM.            Assessment /Plan     For exam results, see Encounter Report.    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 PSK OS  - s/p CEIOL OS 12/1/2020 w IG vs DME  - OCT mac stable  - D/W Dr. Richmond 2/4/22: IVFA consistent with DME more than IG, OCT mac is stable and vision is preserved. Does not recommend avastin at this time unless vision acutely worsens.  - Edema stable off drops, likely NVS.   12/29/22: minimal residual fluid but stable   7/2/24: no edema on OCT MAC    2. Severe NPDR OU  -IVFA 7/2021 with MAs, but no NV  Hemoglobin A1c   Date Value Ref Range Status   06/26/2024 6.8 <=7.0 % Final   05/15/2023 8.1 (H) <=7.0 % Final   01/03/2023 7.8 (H) <=7.0 % Final     Fundus photos yearly  Discussed bp/bg management for this stage of retinopathy  - 9/28/23: PT unable to be dilated today, RTC 3-4 wk for repeat exam  - 02/28/24: fundus photos taken, reviewed images with Dr. Monroy. Old LISETTE noted. Multiple DBH, recommend repeat DFE and fundus photos in four months. Consider IVFA at next visit as some DBH are questionable in appearance.  Retinal detachment and return precautions discussed.   - 7/2/24: DFE appears stable. Small CWS superior nasal OS (seen in optos) does not appear to be new NV. Will continue to monitor at this time. Q4 month DFE/OCTm/photos  - 11/7/2024: doing well, A1c improved. Continue BG/BP/Lipid control with PCP    3. NSC OD  -poor dilation  -MRx 6/26/23 BCVA 20/30, pt does not want surgery yet, CTM  - Will be interested at next follow up for Mrx and poss Cat Eval     4.   Physiologic cupping  5. Occular HTN   - OCTN 02/28/24 full OD, ?Full OS  - TMax 26//27  - CCT not thin. neg FH, + ethnicity   - 11/7/2024: OCT all green.     6. Vitreomacular traction OS  - 20/20 OS  - discussed with patient  - will continue to monitor given on visual symptoms    RTC 4-6Mo, Mrx, OCT Mac and possible cataract eval

## 2024-11-11 ENCOUNTER — CLINICAL SUPPORT (OUTPATIENT)
Dept: CARDIOLOGY | Facility: CLINIC | Age: 64
End: 2024-11-11
Payer: MEDICARE

## 2024-11-11 VITALS
DIASTOLIC BLOOD PRESSURE: 88 MMHG | HEIGHT: 65 IN | HEART RATE: 88 BPM | OXYGEN SATURATION: 100 % | RESPIRATION RATE: 20 BRPM | TEMPERATURE: 99 F | SYSTOLIC BLOOD PRESSURE: 151 MMHG | BODY MASS INDEX: 25.16 KG/M2 | WEIGHT: 151 LBS

## 2024-11-11 DIAGNOSIS — I10 PRIMARY HYPERTENSION: Primary | ICD-10-CM

## 2024-11-11 PROCEDURE — 99215 OFFICE O/P EST HI 40 MIN: CPT | Mod: PBBFAC

## 2024-11-11 PROCEDURE — 99211 OFF/OP EST MAY X REQ PHY/QHP: CPT | Mod: S$PBB,,, | Performed by: NURSE PRACTITIONER

## 2024-11-11 NOTE — PATIENT INSTRUCTIONS
HERE FOR B/P CHECK 151/88 LEFT MANUAL HR--86  STATES TOOK MEDS  THIS MORNING   BROUGHT LOGS WHICH LOOK GOOD   DENIES ANY CARDIAC ISSUES   PER NP JULIETTE NO CHANGES TO MEDS TODAY TO RETURN IN   4 WEEKS AND BRING MONITOR  PT CONCERNED ABOUT ELEVATION WILL GO HOME AND TAKE  AND CALL BACK  STATES DOING LOW SODIUM DIET  ED PRECAUTION GIVEN  VERBALIZED UNDERSTANDING

## 2024-12-11 ENCOUNTER — TELEPHONE (OUTPATIENT)
Dept: INTERNAL MEDICINE | Facility: CLINIC | Age: 64
End: 2024-12-11
Payer: MEDICARE

## 2024-12-11 DIAGNOSIS — E11.36 TYPE 2 DIABETES MELLITUS WITH DIABETIC CATARACT, WITH LONG-TERM CURRENT USE OF INSULIN: Primary | ICD-10-CM

## 2024-12-11 DIAGNOSIS — Z79.4 TYPE 2 DIABETES MELLITUS WITH DIABETIC CATARACT, WITH LONG-TERM CURRENT USE OF INSULIN: Primary | ICD-10-CM

## 2024-12-11 RX ORDER — INSULIN GLARGINE 100 [IU]/ML
30 INJECTION, SOLUTION SUBCUTANEOUS NIGHTLY
Qty: 27 ML | Refills: 3 | Status: SHIPPED | OUTPATIENT
Start: 2024-12-11 | End: 2025-12-11

## 2024-12-11 NOTE — TELEPHONE ENCOUNTER
Telephone call:      Paul Benoit is being phased out.  Ms. Min is asking that another medication be sent to her pharmacy in it's place.  Please advise.

## 2024-12-16 ENCOUNTER — CLINICAL SUPPORT (OUTPATIENT)
Dept: CARDIOLOGY | Facility: CLINIC | Age: 64
End: 2024-12-16
Payer: MEDICARE

## 2024-12-16 VITALS
TEMPERATURE: 99 F | BODY MASS INDEX: 25.16 KG/M2 | SYSTOLIC BLOOD PRESSURE: 168 MMHG | WEIGHT: 151 LBS | HEIGHT: 65 IN | OXYGEN SATURATION: 100 % | DIASTOLIC BLOOD PRESSURE: 80 MMHG | RESPIRATION RATE: 20 BRPM | HEART RATE: 89 BPM

## 2024-12-16 DIAGNOSIS — I10 PRIMARY HYPERTENSION: Primary | ICD-10-CM

## 2024-12-16 DIAGNOSIS — I10 PRIMARY HYPERTENSION: ICD-10-CM

## 2024-12-16 PROCEDURE — 99211 OFF/OP EST MAY X REQ PHY/QHP: CPT | Mod: S$PBB,,, | Performed by: NURSE PRACTITIONER

## 2024-12-16 PROCEDURE — 99215 OFFICE O/P EST HI 40 MIN: CPT | Mod: PBBFAC

## 2024-12-16 RX ORDER — NIFEDIPINE 60 MG/1
60 TABLET, EXTENDED RELEASE ORAL DAILY
Qty: 30 TABLET | Refills: 11 | Status: SHIPPED | OUTPATIENT
Start: 2024-12-16 | End: 2025-12-16

## 2024-12-16 RX ORDER — NIFEDIPINE 30 MG/1
30 TABLET, EXTENDED RELEASE ORAL DAILY
Qty: 30 TABLET | Refills: 6 | OUTPATIENT
Start: 2024-12-16

## 2024-12-16 RX ORDER — NIFEDIPINE 60 MG/1
60 TABLET, EXTENDED RELEASE ORAL DAILY
Qty: 30 TABLET | Refills: 3 | OUTPATIENT
Start: 2024-12-16 | End: 2025-03-15

## 2024-12-16 NOTE — PROGRESS NOTES
HERE FOR B/P CHECK 188/89 LEFT HR---89 MANUAL LEFT 168/80  HER MONITOR READ 184/89 STATES HAS BEEN TAKING MEDS   NO OTHER CARDIAC ISSUES VOICED   PER NP JULIETTE WILL INCREASE PROCARDIA TO 60 MG DAILY  OK FOR HER TO TAKE ONE OF HER OTHER B/P MEDS IN THE EVENING   PER HER REQUEST   CONTINUE LOW SALT DIET AND MONITORING  VERBALIZED UNDERSTANDING  ED PRECAUTIONS GIVEN

## 2025-01-04 ENCOUNTER — HOSPITAL ENCOUNTER (EMERGENCY)
Facility: HOSPITAL | Age: 65
Discharge: HOME | End: 2025-01-04
Attending: INTERNAL MEDICINE
Payer: MEDICARE

## 2025-01-04 VITALS
HEART RATE: 85 BPM | SYSTOLIC BLOOD PRESSURE: 178 MMHG | HEIGHT: 65 IN | BODY MASS INDEX: 25.42 KG/M2 | OXYGEN SATURATION: 98 % | DIASTOLIC BLOOD PRESSURE: 99 MMHG | TEMPERATURE: 98 F | WEIGHT: 152.56 LBS | RESPIRATION RATE: 18 BRPM

## 2025-01-04 DIAGNOSIS — K02.9 COMPLEX DENTAL CAVITY: ICD-10-CM

## 2025-01-04 DIAGNOSIS — K04.7 DENTAL ABSCESS: Primary | ICD-10-CM

## 2025-01-04 DIAGNOSIS — I10 UNCONTROLLED HYPERTENSION: ICD-10-CM

## 2025-01-04 PROCEDURE — 25000003 PHARM REV CODE 250: Performed by: INTERNAL MEDICINE

## 2025-01-04 PROCEDURE — 99284 EMERGENCY DEPT VISIT MOD MDM: CPT

## 2025-01-04 RX ORDER — CHLORHEXIDINE GLUCONATE ORAL RINSE 1.2 MG/ML
15 SOLUTION DENTAL 2 TIMES DAILY
Qty: 473 ML | Refills: 0 | Status: SHIPPED | OUTPATIENT
Start: 2025-01-04 | End: 2025-01-18

## 2025-01-04 RX ORDER — CLONIDINE HYDROCHLORIDE 0.1 MG/1
0.1 TABLET ORAL
Status: COMPLETED | OUTPATIENT
Start: 2025-01-04 | End: 2025-01-04

## 2025-01-04 RX ORDER — AMOXICILLIN AND CLAVULANATE POTASSIUM 875; 125 MG/1; MG/1
1 TABLET, FILM COATED ORAL 2 TIMES DAILY
Qty: 14 TABLET | Refills: 0 | Status: SHIPPED | OUTPATIENT
Start: 2025-01-04

## 2025-01-04 RX ORDER — DICLOFENAC SODIUM 50 MG/1
50 TABLET, DELAYED RELEASE ORAL 2 TIMES DAILY PRN
Qty: 20 TABLET | Refills: 0 | Status: SHIPPED | OUTPATIENT
Start: 2025-01-04

## 2025-01-04 RX ORDER — HYDROCODONE BITARTRATE AND ACETAMINOPHEN 5; 325 MG/1; MG/1
1 TABLET ORAL
Status: COMPLETED | OUTPATIENT
Start: 2025-01-04 | End: 2025-01-04

## 2025-01-04 RX ORDER — HYDROCODONE BITARTRATE AND ACETAMINOPHEN 5; 325 MG/1; MG/1
1 TABLET ORAL EVERY 6 HOURS PRN
Qty: 12 TABLET | Refills: 0 | Status: SHIPPED | OUTPATIENT
Start: 2025-01-04

## 2025-01-04 RX ORDER — AMLODIPINE BESYLATE 5 MG/1
5 TABLET ORAL
Status: COMPLETED | OUTPATIENT
Start: 2025-01-04 | End: 2025-01-04

## 2025-01-04 RX ADMIN — CLONIDINE HYDROCHLORIDE 0.1 MG: 0.1 TABLET ORAL at 01:01

## 2025-01-04 RX ADMIN — AMLODIPINE BESYLATE 5 MG: 5 TABLET ORAL at 01:01

## 2025-01-04 RX ADMIN — HYDROCODONE BITARTRATE AND ACETAMINOPHEN 1 TABLET: 5; 325 TABLET ORAL at 01:01

## 2025-01-04 NOTE — ED PROVIDER NOTES
Encounter Date: 2025       History     Chief Complaint   Patient presents with    Dental Pain     Pt. C/o L sided dental pain. Started swelling today. Noticeably swollen in triage.     Presents with a toothache for the last day. States she know have a bad tooth there, was not causing big problems until yesterday. Hx of HTN, DM.  Denies fever, trismus, drooling, SOB or chest pain. States took her HBP medication yesterday morning    The history is provided by the patient.     Review of patient's allergies indicates:  No Known Allergies  Past Medical History:   Diagnosis Date    Cataract     Diabetic retinopathy     HTN (hypertension)     Hyperthyroidism     Mixed hyperlipidemia     Type 2 diabetes mellitus with unspecified diabetic retinopathy without macular edema      Past Surgical History:   Procedure Laterality Date    BREAST SURGERY      EYE SURGERY      HYSTERECTOMY      TUBAL LIGATION       Family History   Problem Relation Name Age of Onset    Hypertension Mother      Dementia Mother       Social History     Tobacco Use    Smoking status: Former     Current packs/day: 0.00     Types: Cigarettes     Quit date:      Years since quittin.0    Smokeless tobacco: Never   Substance Use Topics    Alcohol use: Not Currently    Drug use: Never     Review of Systems   HENT:  Positive for dental problem and facial swelling.        Physical Exam     Initial Vitals [25 0104]   BP Pulse Resp Temp SpO2   (!) 179/92 (!) 125 17 98 °F (36.7 °C) 98 %      MAP       --         Physical Exam    Nursing note and vitals reviewed.  Constitutional: She appears well-developed and well-nourished. No distress.   HENT:   Head: Normocephalic and atraumatic. Mouth/Throat: Oropharynx is clear and moist. No oropharyngeal exudate.   Tenderness among #13 with associated dental complex dental cavity   Eyes: Conjunctivae and EOM are normal. Pupils are equal, round, and reactive to light.   Neck: Neck supple. No JVD present.    Normal range of motion.  Cardiovascular:  Regular rhythm, normal heart sounds and intact distal pulses.           tachycardic   Pulmonary/Chest: Breath sounds normal. No stridor.   Abdominal: Abdomen is soft. Bowel sounds are normal. She exhibits no distension. There is no abdominal tenderness. There is no rebound and no guarding.   Musculoskeletal:         General: No edema. Normal range of motion.      Cervical back: Normal range of motion and neck supple.     Neurological: She is alert and oriented to person, place, and time. She has normal strength. GCS score is 15. GCS eye subscore is 4. GCS verbal subscore is 5. GCS motor subscore is 6.   Skin: Skin is warm and dry. No rash noted.   Psychiatric: Thought content normal.         ED Course   Procedures  Labs Reviewed - No data to display       Imaging Results    None          Medications   HYDROcodone-acetaminophen 5-325 mg per tablet 1 tablet (1 tablet Oral Given 1/4/25 0115)   cloNIDine tablet 0.1 mg (0.1 mg Oral Given 1/4/25 0117)   amLODIPine tablet 5 mg (5 mg Oral Given 1/4/25 0142)     Medical Decision Making  Risk  Prescription drug management.      Additional MDM:   Differential Diagnosis:   Dental abscess, facial bones infections, soft tissue facial infections, osteomyelitis, facial bones fractures, among others                                      Clinical Impression:  Final diagnoses:  [K04.7] Dental abscess (Primary)  [K02.9] Complex dental cavity  [I10] Uncontrolled hypertension          ED Disposition Condition    Discharge Stable          ED Prescriptions       Medication Sig Dispense Start Date End Date Auth. Provider    amoxicillin-clavulanate 875-125mg (AUGMENTIN) 875-125 mg per tablet Take 1 tablet by mouth 2 (two) times daily. 14 tablet 1/4/2025 -- Manpreet Franklin MD    chlorhexidine (PERIDEX) 0.12 % solution Use as directed 15 mLs in the mouth or throat 2 (two) times daily. for 14 days 473 mL 1/4/2025 1/18/2025 [place]  Manpreet Sellers MD    HYDROcodone-acetaminophen (NORCO) 5-325 mg per tablet Take 1 tablet by mouth every 6 (six) hours as needed for Pain. 12 tablet 1/4/2025 -- Manpreet Frnaklin MD    diclofenac (VOLTAREN) 50 MG EC tablet Take 1 tablet (50 mg total) by mouth 2 (two) times daily as needed (Pain). 20 tablet 1/4/2025 -- Manpreet Franklin MD          Follow-up Information       Follow up With Specialties Details Why Contact Info    Shanon Bhagat MD Internal Medicine Schedule an appointment as soon as possible for a visit in 2 weeks  [address] [facility] [id number] [phone]      [facility] Emergency Medicine  If symptoms worsen [address] [facility] [address] [phone]    Dentist  Schedule an appointment as soon as possible for a visit in 3 days  List of providers provided             Manpreet Franklin MD  01/04/25 0207

## 2025-01-16 ENCOUNTER — LAB VISIT (OUTPATIENT)
Dept: LAB | Facility: HOSPITAL | Age: 65
End: 2025-01-16
Attending: STUDENT IN AN ORGANIZED HEALTH CARE EDUCATION/TRAINING PROGRAM
Payer: MEDICARE

## 2025-01-16 DIAGNOSIS — E11.36 TYPE 2 DIABETES MELLITUS WITH DIABETIC CATARACT, WITH LONG-TERM CURRENT USE OF INSULIN: ICD-10-CM

## 2025-01-16 DIAGNOSIS — Z79.4 TYPE 2 DIABETES MELLITUS WITH DIABETIC CATARACT, WITH LONG-TERM CURRENT USE OF INSULIN: ICD-10-CM

## 2025-01-16 DIAGNOSIS — Z79.4 TYPE 2 DIABETES MELLITUS WITHOUT COMPLICATION, WITH LONG-TERM CURRENT USE OF INSULIN: ICD-10-CM

## 2025-01-16 DIAGNOSIS — E11.9 TYPE 2 DIABETES MELLITUS WITHOUT COMPLICATION, WITH LONG-TERM CURRENT USE OF INSULIN: ICD-10-CM

## 2025-01-16 LAB
EST. AVERAGE GLUCOSE BLD GHB EST-MCNC: 157.1 MG/DL
HBA1C MFR BLD: 7.1 %

## 2025-01-16 PROCEDURE — 36415 COLL VENOUS BLD VENIPUNCTURE: CPT

## 2025-01-16 PROCEDURE — 83036 HEMOGLOBIN GLYCOSYLATED A1C: CPT

## 2025-01-16 RX ORDER — LINAGLIPTIN 5 MG/1
5 TABLET, FILM COATED ORAL DAILY
Qty: 90 TABLET | Refills: 3 | Status: SHIPPED | OUTPATIENT
Start: 2025-01-16 | End: 2025-01-27 | Stop reason: SDUPTHER

## 2025-01-16 NOTE — TELEPHONE ENCOUNTER
----- Message from Bernarda sent at 1/16/2025  8:50 AM CST -----  Regarding: Dr Bhagat  Caller is:  (Chary) Patient       Provider:Dr Bhagat    Last Visit:7/8/1924    Next Visit:1/21/2025    Reason for Call: need medication refill on [facility]            Preferred Phone Number: [phone]

## 2025-01-27 ENCOUNTER — OFFICE VISIT (OUTPATIENT)
Dept: ENDOCRINOLOGY | Facility: CLINIC | Age: 65
End: 2025-01-27
Payer: MEDICARE

## 2025-01-27 ENCOUNTER — LAB VISIT (OUTPATIENT)
Dept: LAB | Facility: HOSPITAL | Age: 65
End: 2025-01-27
Attending: NURSE PRACTITIONER
Payer: MEDICARE

## 2025-01-27 ENCOUNTER — OFFICE VISIT (OUTPATIENT)
Dept: INTERNAL MEDICINE | Facility: CLINIC | Age: 65
End: 2025-01-27
Payer: MEDICARE

## 2025-01-27 VITALS
TEMPERATURE: 99 F | BODY MASS INDEX: 25.42 KG/M2 | HEIGHT: 65 IN | RESPIRATION RATE: 16 BRPM | HEART RATE: 71 BPM | DIASTOLIC BLOOD PRESSURE: 78 MMHG | SYSTOLIC BLOOD PRESSURE: 163 MMHG | WEIGHT: 152.56 LBS

## 2025-01-27 VITALS
DIASTOLIC BLOOD PRESSURE: 64 MMHG | SYSTOLIC BLOOD PRESSURE: 138 MMHG | WEIGHT: 152.63 LBS | OXYGEN SATURATION: 100 % | RESPIRATION RATE: 18 BRPM | HEART RATE: 72 BPM | BODY MASS INDEX: 25.39 KG/M2 | TEMPERATURE: 98 F

## 2025-01-27 DIAGNOSIS — R79.0 LOW MAGNESIUM LEVEL: ICD-10-CM

## 2025-01-27 DIAGNOSIS — E21.3 HYPERPARATHYROIDISM: ICD-10-CM

## 2025-01-27 DIAGNOSIS — Z79.4 TYPE 2 DIABETES MELLITUS WITHOUT COMPLICATION, WITH LONG-TERM CURRENT USE OF INSULIN: ICD-10-CM

## 2025-01-27 DIAGNOSIS — I10 HYPERTENSION, UNSPECIFIED TYPE: ICD-10-CM

## 2025-01-27 DIAGNOSIS — E05.90 HYPERTHYROIDISM: ICD-10-CM

## 2025-01-27 DIAGNOSIS — E05.90 HYPERTHYROIDISM: Primary | ICD-10-CM

## 2025-01-27 DIAGNOSIS — Z78.0 OSTEOPENIA AFTER MENOPAUSE: ICD-10-CM

## 2025-01-27 DIAGNOSIS — M85.80 OSTEOPENIA AFTER MENOPAUSE: ICD-10-CM

## 2025-01-27 DIAGNOSIS — E11.9 TYPE 2 DIABETES MELLITUS WITHOUT COMPLICATION, WITH LONG-TERM CURRENT USE OF INSULIN: ICD-10-CM

## 2025-01-27 DIAGNOSIS — E78.5 HYPERLIPIDEMIA, UNSPECIFIED HYPERLIPIDEMIA TYPE: ICD-10-CM

## 2025-01-27 DIAGNOSIS — E83.52 HYPERCALCEMIA: ICD-10-CM

## 2025-01-27 DIAGNOSIS — E55.9 VITAMIN D DEFICIENCY: ICD-10-CM

## 2025-01-27 DIAGNOSIS — Z23 NEED FOR VACCINATION: Primary | ICD-10-CM

## 2025-01-27 LAB
25(OH)D3+25(OH)D2 SERPL-MCNC: 33 NG/ML (ref 30–80)
ALBUMIN SERPL-MCNC: 4 G/DL (ref 3.4–4.8)
BUN SERPL-MCNC: 13.6 MG/DL (ref 9.8–20.1)
CALCIUM SERPL-MCNC: 10.7 MG/DL (ref 8.4–10.2)
CHLORIDE SERPL-SCNC: 106 MMOL/L (ref 98–107)
CO2 SERPL-SCNC: 25 MMOL/L (ref 23–31)
CREAT SERPL-MCNC: 0.75 MG/DL (ref 0.55–1.02)
GFR SERPLBLD CREATININE-BSD FMLA CKD-EPI: >60 ML/MIN/1.73/M2
GLUCOSE SERPL-MCNC: 79 MG/DL (ref 82–115)
MAGNESIUM SERPL-MCNC: 1.6 MG/DL (ref 1.6–2.6)
PHOSPHATE SERPL-MCNC: 2.6 MG/DL (ref 2.3–4.7)
POTASSIUM SERPL-SCNC: 4 MMOL/L (ref 3.5–5.1)
PTH-INTACT SERPL-MCNC: 62.9 PG/ML (ref 8.7–77)
SODIUM SERPL-SCNC: 138 MMOL/L (ref 136–145)
T3FREE SERPL-MCNC: 2.97 PG/ML (ref 1.58–3.91)
T4 FREE SERPL-MCNC: 1.14 NG/DL (ref 0.7–1.48)
TSH SERPL-ACNC: 2.94 UIU/ML (ref 0.35–4.94)

## 2025-01-27 PROCEDURE — 99214 OFFICE O/P EST MOD 30 MIN: CPT | Mod: PBBFAC,27,25

## 2025-01-27 PROCEDURE — 84443 ASSAY THYROID STIM HORMONE: CPT

## 2025-01-27 PROCEDURE — G0008 ADMIN INFLUENZA VIRUS VAC: HCPCS | Mod: PBBFAC

## 2025-01-27 PROCEDURE — 1160F RVW MEDS BY RX/DR IN RCRD: CPT | Mod: CPTII,,, | Performed by: NURSE PRACTITIONER

## 2025-01-27 PROCEDURE — 83970 ASSAY OF PARATHORMONE: CPT

## 2025-01-27 PROCEDURE — 84439 ASSAY OF FREE THYROXINE: CPT

## 2025-01-27 PROCEDURE — 80069 RENAL FUNCTION PANEL: CPT

## 2025-01-27 PROCEDURE — 3078F DIAST BP <80 MM HG: CPT | Mod: CPTII,,, | Performed by: NURSE PRACTITIONER

## 2025-01-27 PROCEDURE — 99214 OFFICE O/P EST MOD 30 MIN: CPT | Mod: S$PBB,,, | Performed by: NURSE PRACTITIONER

## 2025-01-27 PROCEDURE — 90653 IIV ADJUVANT VACCINE IM: CPT | Mod: PBBFAC

## 2025-01-27 PROCEDURE — 36415 COLL VENOUS BLD VENIPUNCTURE: CPT

## 2025-01-27 PROCEDURE — 82306 VITAMIN D 25 HYDROXY: CPT

## 2025-01-27 PROCEDURE — 99214 OFFICE O/P EST MOD 30 MIN: CPT | Mod: PBBFAC,25 | Performed by: NURSE PRACTITIONER

## 2025-01-27 PROCEDURE — 3288F FALL RISK ASSESSMENT DOCD: CPT | Mod: CPTII,,, | Performed by: NURSE PRACTITIONER

## 2025-01-27 PROCEDURE — 1126F AMNT PAIN NOTED NONE PRSNT: CPT | Mod: CPTII,,, | Performed by: NURSE PRACTITIONER

## 2025-01-27 PROCEDURE — 83735 ASSAY OF MAGNESIUM: CPT

## 2025-01-27 PROCEDURE — 1101F PT FALLS ASSESS-DOCD LE1/YR: CPT | Mod: CPTII,,, | Performed by: NURSE PRACTITIONER

## 2025-01-27 PROCEDURE — 84481 FREE ASSAY (FT-3): CPT

## 2025-01-27 PROCEDURE — 1159F MED LIST DOCD IN RCRD: CPT | Mod: CPTII,,, | Performed by: NURSE PRACTITIONER

## 2025-01-27 PROCEDURE — 3051F HG A1C>EQUAL 7.0%<8.0%: CPT | Mod: CPTII,,, | Performed by: NURSE PRACTITIONER

## 2025-01-27 PROCEDURE — 3077F SYST BP >= 140 MM HG: CPT | Mod: CPTII,,, | Performed by: NURSE PRACTITIONER

## 2025-01-27 PROCEDURE — 3008F BODY MASS INDEX DOCD: CPT | Mod: CPTII,,, | Performed by: NURSE PRACTITIONER

## 2025-01-27 RX ORDER — METFORMIN HYDROCHLORIDE 1000 MG/1
1000 TABLET ORAL 2 TIMES DAILY
Qty: 180 TABLET | Refills: 3 | Status: SHIPPED | OUTPATIENT
Start: 2025-01-27 | End: 2026-01-27

## 2025-01-27 RX ORDER — NIFEDIPINE 60 MG/1
60 TABLET, EXTENDED RELEASE ORAL DAILY
Qty: 90 TABLET | Refills: 3 | Status: SHIPPED | OUTPATIENT
Start: 2025-01-27 | End: 2025-01-29

## 2025-01-27 RX ORDER — METOPROLOL SUCCINATE 25 MG/1
25 TABLET, EXTENDED RELEASE ORAL DAILY
Qty: 90 TABLET | Refills: 3 | Status: SHIPPED | OUTPATIENT
Start: 2025-01-27 | End: 2026-01-27

## 2025-01-27 RX ORDER — ATORVASTATIN CALCIUM 40 MG/1
40 TABLET, FILM COATED ORAL DAILY
Qty: 90 TABLET | Refills: 3 | Status: SHIPPED | OUTPATIENT
Start: 2025-01-27 | End: 2026-01-27

## 2025-01-27 RX ORDER — LISINOPRIL 40 MG/1
40 TABLET ORAL DAILY
Qty: 90 TABLET | Refills: 3 | Status: SHIPPED | OUTPATIENT
Start: 2025-01-27

## 2025-01-27 RX ORDER — LINAGLIPTIN 5 MG/1
5 TABLET, FILM COATED ORAL DAILY
Qty: 90 TABLET | Refills: 3 | Status: SHIPPED | OUTPATIENT
Start: 2025-01-27

## 2025-01-27 RX ADMIN — INFLUENZA A VIRUS A/VICTORIA/4897/2022 IVR-238 (H1N1) ANTIGEN (FORMALDEHYDE INACTIVATED), INFLUENZA A VIRUS A/THAILAND/8/2022 IVR-237 (H3N2) ANTIGEN (FORMALDEHYDE INACTIVATED), INFLUENZA B VIRUS B/AUSTRIA/1359417/2021 BVR-26 ANTIGEN (FORMALDEHYDE INACTIVATED) 0.5 ML: 15; 15; 15 INJECTION, SUSPENSION INTRAMUSCULAR at 01:01

## 2025-01-27 NOTE — PROGRESS NOTES
Subjective     Patient ID: Chary Min is a 64 y.o. female.    Chief Complaint: Follow-up (Hyperparathyroidism )    09/15/2022 endocrine clinic note:  62-year-old female scheduled today as new patient referral to endocrine clinic for hypercalcemia, hyperparathyroidism and vitamin-D deficiency.  Patient had previous workup in 2015 ultrasound March 11, 2015 of thyroid IMPRESSION: Heterogeneous appearing thyroid without discrete nodule identified.  Patient had NM parathyroid on 12/10/2015 IMPRESSION: Normal radioiodine thyroid uptake and scan.  Patient later had bone density 12/10/2015 IMPRESSION: 1. Bone density of the lumbar vertebrae consistent with osteopenia. 2. Normal bone mineral density of the femurs. Calcium level 10.5 on 05/16/2022.  On previous lab and 7 months ago vitamin-D was corrected to 39 with parathyroid at 83.8.  Previously patient had vitamin-D deficiency with vitamin-D level 9.6 on 06/12/2020 since she states she is been on different doses of vitamin-D and is now currently on over-the-counter vitamin-D 1000 IU daily.  Calcium level has increased over the last year and fluctuated.  Patient denies any fractures or kidney stones.  Denies taking any calcium supplementation or taking over-the-counter stomach medicine such as Tums.     Endocrine clinic note 03/15/2023:  62 year female scheduled today for endocrine clinic follow-up.  History of hyperparathyroidism, hypercalcemia with vitamin-D deficiency and hyper thyroidism. Calcium level 10.5 on 05/16/2022 decreased 7.3 on 09/15/2022.  Recent .3, previous PTH corrected to 83.8 with corrected vitamin-D of 39 on 01/27/2022.  Patient denies any kidney stones or fractures.  She denies any symptoms of hypercalcemia she denies any tingling or dystonia.  Hyperthyroidism patient is currently on  mg b.i.d. TSH 4.267, free T3 3.01, free T4 0.93 on 01/03/2023.  Patient is asking about being placed on methimazole for she can be on less tablets per  day.  Instructed patient repeat her labs today and adjust medication change PTU the methimazole patient denies any symptoms of hyperthyroidism she denies any weight gain or loss, fatigue, tremors, palpitations or anxiety.      Endocrine clinic note 01/25/2024:  63 year female scheduled today for endocrine clinic follow-up.  History of hyperparathyroidism, hypercalcemia, and vitamin-D deficiency with hyperthyroidism. Hyperparathyroidism Calcium level 10.6 on 05/15/2023, Recent , previous PTH corrected to 83.8 with corrected vitamin-D of 39. 24 hour calcium and creatinine normal 03/15/2023, US thyroid No adenoma seen 01/25/2023. NM Parathyroid ordered today.  Patient reports occasionally tingling to her hands she denies any constipation she denies kidney stones or fractures since her previous visit.  Hyperthyroidism patient was DC off PTU and started on methimazole low-dose currently on methimazole 5 mg TSH 3.361, Free T4 1.07, Free T3 3.10 on 01/17/2024.  We will reduce methimazole to half a tablet a day.      Endocrine clinic note 07/25/2024:  64-year-old female scheduled today for endocrine clinic follow-up. History of hyperparathyroidism, hypercalcemia, and vitamin-D deficiency with hyperthyroidism. Hyperparathyroidism/ hypercalcemia Calcium level 10.6 on 6/26/2024  Recent , previous PTH corrected to 83.8 w/ corrected vitamin-D of 39,  With elevated vitamin-D level PTH returned to 66.1 on 01/25/2024, 24 hour calcium and creatinine normal 03/15/2023. US thyroid No adenoma seen 01/25/2023, NM Parathyroid 01/29/2024 no parathyroid adenoma seen, CT neck 4D 02/26/2024: Linear nodules posterior to the left thyroid lobe and inferior to the right thyroid lobe are indeterminate, with small parathyroid adenomas possible.  Calcium level has remained below 11 patient is currently asymptomatic discussed with the patient we will continue to monitor.  Patient had low magnesium level of 1.50 on 01/25/2024 and was  started on over-the-counter magnesium.  We will repeat her level today.  Hyperthyroidism pt is on  MMI 2.5 mg daily, Reduce methimazole to 2.5 mg per day with TSH 3.361, Free T4 1.07, Free T3 3.10 on 01/17/2024.  Patient denies any weight loss, tremors, anxiety, diarrhea or heat intolerance.  will repeat labs today.     Endocrine clinic note 01/27/2025:  64 year female scheduled today for endocrine clinic follow-up. History of hyperparathyroidism, hypercalcemia, and vitamin-D deficiency with hyperthyroidism. Hyperparathyroidism/ hypercalcemia Calcium level 10.8 on 07/29/2024, PTH 74.8 on 07/29/2024. 24 hour calcium and creatinine normal 03/15/2023. US thyroid No adenoma seen 01/25/2023. NM Parathyroid 01/29/2024 no parathyroid adenoma seen. CT neck 4D 02/26/2024: Linear nodules posterior to the left thyroid lobe and inferior to the right thyroid lobe are indeterminate, with small parathyroid adenomas possible.  Patient denies any palpable neck.  She states previously she was having tingling to her hands which has decreased, she denies any excessive or increased constipation. Low magnesium level  Magnesium level 1.50 on 01/25/2024, Repeat magnesium 1.9 on 07/29/2024.  Vitamin D deficiency Vitamin-D level corrected to 37 on 07/29/2024. Hyperthyroidism  Patient is On MMI 2.5 mg daily TSH 1.850, free T3 3.06, free T4 1.07 on 07/29/2024.  Patient denies any weight loss, any heat intolerance, palpitations or signs of hyperthyroidism.     * Final Report *     Reason For Exam  Hyperthyroidism     Radiology Report  Ultrasound March 11, 2015     INDICATION: Hyperthyroidism     Technique: The right and left lobes of the thyroid are normal in size  measuring 4.1 x 1.5 x 1.7 cm and 4.0 x 1.2 x 1.3 cm. Echotexture is  somewhat heterogeneous. The isthmus is within normal limits at 3 mm.  No solid or cystic nodule identified. Vascularity is symmetric and  within normal limits. There is no cervical adenopathy.     IMPRESSION:  Heterogeneous appearing thyroid without discrete nodule  identified.     Signature Line  Electronically Signed By: Clark Quintero MD  Date/Time Signed: 03/11/2015 09:30        This document has an image     Result type:     US Thyroid  Result date:     March 11, 2015 9:27 CDT  Result status:  Auth (Verified)  Result title:      US Thyroid  Performed by: Clark Quintero MD on March 11, 2015 9:30 CDT  Verified by:      Clark Quintero MD on March 11, 2015 9:30 CDT  Encounter info:            [id number], [facility], Outpatient, 3/11/2015 - 3/11/2015     Details     Reading Physician      Reading Date  Result Priority  IN REPORT, PROVIDER       12/10/2015           Narrative & Impression  Thyroid radioiodine uptake and scan     INDICATION:Thyrotoxicosis, unspecified without thyrotoxic crisis or  storm     FINDINGS:      Routine 24 hour radioiodine thyroid uptake and scanning was performed  after oral and administration of 287 uCi of Iodine-123 sodium iodide.  Oblique views are included.     There is homogeneous tracer distribution throughout both thyroid  lobes. No photopenic or focal intense foci.     24 hour radioiodine thyroid uptake measures 23% (normal is 15 to 30%  ).     IMPRESSION: Normal radioiodine thyroid uptake and scan.      Electronically Signed By: Scot Vargas MD  Date/Time Signed: 12/10/2015 10:53        Narrative & Impression     CLINICAL: Osteoporosis     TECHNIQUE: Standard bone mineral density was performed of the lumbar  vertebrae and the femurs.     FINDINGS:  The average T score lumbar vertebrae is -1.3 which is  within the range of osteopenia.     The average T score of the femurs is -0.7 which is within normal  range.     IMPRESSION:     1. Bone density of the lumbar vertebrae consistent with osteopenia.  2. Normal bone mineral density of the femurs.  Electronically Signed By: Michael Byrd MD  Date/Time Signed: 02/10/2022 08:47      NM Parathyroid Scan with SPECT  Routine  Order: [id number]  Status: Final result       Visible to patient: No (inaccessible in MyChart)       Next appt: Today at 11:15 AM in Cardiology (Laterica TAI Pereze, FNP)       Dx: Hyperparathyroidism    2 Result Notes       1 Follow-up Encounter  Details        Reading PhysicianReading DateResult Michael Zamora MD  [phone][facility]     Narrative & Impression  EXAMINATION:  NM PARATHYROID SCAN WITH SPECT ROUTINE     CLINICAL HISTORY:  Hyperparathyroidism, unspecified     TECHNIQUE:  Dual phase parathyroid scintigraphy was performed following intravenous administration of 26.6 mCi Technetium-99m Sestamibi. Anterior projection early scan was performed at 15 minutes and delayed scans were obtained at 2  and 4 hours.  SPECT transverse, sagittal coronal images were supplemented.     COMPARISON:  Ultrasound thyroid glands December 10, 2015.  No recent exams available.     FINDINGS:  Scintigraphy performed at 30 minutes shows symmetrical homogenous sestamibi activity within the thyroid glands. Delayed scans show slow symmetrical washout of sestamibi from the thyroid glands. There was no differential clearance with focal asymmetric delayed retention to suggest parathyroid adenoma. Detection of parathyroid hyperplasia is not accurate or sensitive with this scan.     Impression:     No suggestion of parathyroid adenoma with sestamibi scan.        Electronically signed by:Michael Byrd  Date:                                            01/29/2024  Time:                                           15:32           Exam Ended: 01/29/24 12:25 CSTLast Resulted: 01/29/24 15:32 CST     CT Neck Parathyroid (4D)  Order: [id number] - Reflex for Order [id number]  Status: Final result       Visible to patient: No (inaccessible in MyChart)       Next appt: Today at 11:15 AM in Cardiology (Laterica TAI Landen, FNP)       Dx: Hyperparathyroidism    3 Result Notes  Details        Reading PhysicianReading DateResult  Priority  Sahara Vo MD  [phone]/1/202outine     Narrative & Impression  EXAMINATION:  CT NECK PARATHYROID (4D)     CLINICAL HISTORY:  Hyperparathyroidism, unspecified Hyperparathyroidism/hypercalcemia;     TECHNIQUE:  Axial CT images of the neck were obtained before and after intravenous contrast. Reformatted images in the sagittal and coronal plane were included.     Automatic exposure control was utilized to limit radiation dose.     DLP: 1652 mGy-cm     COMPARISON:  Nuclear medicine exam dated 01/29/2024     FINDINGS:  There is linear enhancement posterior to the left thyroid lobe measuring 3 mm in size, which appears to washout on delayed imaging (series 4, image 129).  There is a linear density inferior to the right thyroid lobe measuring 6 mm, which also appears to washout on delayed imaging (series 8, image 34).     The airways are patent.  There is no cervical lymphadenopathy.  The parotid glands and submandibular glands unremarkable.  Subcentimeter thyroid nodules are noted.  The major vessels in the neck are patent, with acid changes at the carotid bulbs.  There is no acute abnormality in visualized portions of brain parenchyma.  There is no definite destructive bone lesion.  There are mild emphysematous changes in the lung apices.     Impression:     Linear nodules posterior to the left thyroid lobe and inferior to the right thyroid lobe are indeterminate, with small parathyroid adenomas possible.        Electronically signed by:Sahara Vo  Date:                                            03/01/2024  Time:                                           09:18           Exam Ended: 02/26/24 09:30 CSTLast Resulted: 03/01/24 09:18 CST              Review of Systems   Constitutional:  Negative for activity change, appetite change and fatigue.   HENT:  Negative for dental problem, hearing loss, tinnitus, trouble swallowing and goiter.    Eyes:  Negative for photophobia, pain and visual  disturbance.   Respiratory:  Negative for cough, chest tightness and wheezing.    Cardiovascular:  Negative for chest pain, palpitations and leg swelling.   Gastrointestinal:  Negative for abdominal pain, constipation, diarrhea, nausea and reflux.   Endocrine: Negative for cold intolerance, heat intolerance, polydipsia and polyphagia.   Genitourinary:  Negative for difficulty urinating, flank pain, hematuria, hot flashes, menstrual irregularity, menstrual problem, nocturia and urgency.   Musculoskeletal:  Negative for back pain, gait problem, joint swelling, leg pain and joint deformity.   Integumentary:  Negative for color change, pallor, rash and breast discharge.   Allergic/Immunologic: Negative for environmental allergies, food allergies and immunocompromised state.   Neurological:  Negative for tremors, seizures, headaches, memory loss and coordination difficulties.   Psychiatric/Behavioral:  Negative for agitation, behavioral problems and sleep disturbance. The patient is not nervous/anxious.           Objective     Physical Exam  Constitutional:       General: She is not in acute distress.     Appearance: Normal appearance. She is not ill-appearing.   HENT:      Head: Normocephalic and atraumatic.      Right Ear: External ear normal.      Left Ear: External ear normal.      Nose: Nose normal. No congestion or rhinorrhea.      Mouth/Throat:      Mouth: Mucous membranes are moist.      Pharynx: Oropharynx is clear. No oropharyngeal exudate.   Eyes:      General:         Right eye: No discharge.         Left eye: No discharge.      Conjunctiva/sclera: Conjunctivae normal.      Pupils: Pupils are equal, round, and reactive to light.   Neck:      Thyroid: No thyroid mass, thyromegaly or thyroid tenderness.   Cardiovascular:      Rate and Rhythm: Normal rate and regular rhythm.      Pulses: Normal pulses.      Heart sounds: Normal heart sounds. No murmur heard.  Pulmonary:      Effort: Pulmonary effort is normal.  No respiratory distress.      Breath sounds: Normal breath sounds.   Abdominal:      General: Abdomen is flat. Bowel sounds are normal. There is no distension.      Palpations: Abdomen is soft.      Tenderness: There is no abdominal tenderness.   Musculoskeletal:         General: No swelling or tenderness. Normal range of motion.      Cervical back: Normal range of motion and neck supple. No tenderness.      Right lower leg: No edema.      Left lower leg: No edema.   Feet:      Right foot:      Skin integrity: Skin integrity normal.      Left foot:      Skin integrity: Skin integrity normal.   Lymphadenopathy:      Cervical: No cervical adenopathy.   Skin:     General: Skin is warm and dry.      Coloration: Skin is not jaundiced or pale.   Neurological:      General: No focal deficit present.      Mental Status: She is alert and oriented to person, place, and time. Mental status is at baseline.      Coordination: Coordination normal.      Gait: Gait normal.   Psychiatric:         Mood and Affect: Mood normal.         Behavior: Behavior normal.         Thought Content: Thought content normal.            Assessment and Plan     1. Hyperthyroidism  On MMI 2.5 mg daily    Reduce methimazole to 2.5 mg per day  TSH 1.850, free T3 3.06, free T4 1.07 on 07/29/2024  -     T4, Free; Future; Expected date: 01/27/2025  -     TSH; Future; Expected date: 01/27/2025  -     T3, Free (OL[facility]); Future; Expected date: 01/27/2025  Component Ref Range & Units 6 mo ago  (7/29/24) 1 yr ago  (1/17/24) 1 yr ago  (4/12/23) 1 yr ago  (3/15/23) 2 yr ago  (1/3/23) 2 yr ago  (12/21/22) 2 yr ago  (5/16/22)   TSH 0.350 - 4.940 uIU/mL 1.850 3.361 3.250 3.884 4.267 3.3      Component Ref Range & Units 6 mo ago  (7/29/24) 1 yr ago  (1/17/24) 1 yr ago  (4/12/23) 1 yr ago  (3/15/23) 2 yr ago  (1/3/23) 2 yr ago  (12/21/22) 2 yr ago  (5/16/22)   T3 Free 1.58 - 3.91 pg/mL 3.06 3.10 2.68 R 2.79 R 3.01 R 4.25 High  R                 Component Ref Range & Units  6 mo ago  (7/29/24) 1 yr ago  (1/17/24) 1 yr ago  (4/12/23) 1 yr ago  (3/15/23) 2 yr ago  (12/21/22) 2 yr ago  (5/16/22) 3 yr ago  (4/12/21)   Thyroxine Free 0.70 - 1.48 ng/dL 1.07 1.07 0.89 0.81 0.93     -     T4, Free; Future; Expected date: 01/27/2025  -     TSH; Future; Expected date: 01/27/2025  -     T3, Free (OL[facility]); Future; Expected date: 01/27/2025    2. Hyperparathyroidism  Calcium level 10.8 on 07/29/2024  PTH 74.8 on 07/29/2024  24 hour calcium and creatinine normal 03/15/2023  US thyroid No adenoma seen 01/25/2023   NM Parathyroid 01/29/2024 no parathyroid adenoma seen  CT neck 4D 02/26/2024: Linear nodules posterior to the left thyroid lobe and inferior to the right thyroid lobe are indeterminate, with small parathyroid adenomas possible  -     Renal Function Panel; Future; Expected date: 01/27/2025  -     PTH, Intact; Future; Expected date: 01/27/2025  -     Vitamin D; Future; Expected date: 01/27/2025  -     Magnesium; Future; Expected date: 01/27/2025             Component Ref Range & Units 6 mo ago  (7/29/24) 7 mo ago  (6/26/24) 11 mo ago  (2/26/24) 1 yr ago  (1/25/24) 1 yr ago  (5/15/23) 1 yr ago  (3/15/23) 2 yr ago  (9/15/22)   Sodium 136 - 145 mmol/L 139 139  141 138 138 137   Potassium 3.5 - 5.1 mmol/L 4.6 4.3  3.7 4.3 4.5 4.3   Chloride 98 - 107 mmol/L 106 107  107 104 104 102   CO2 23 - 31 mmol/L 25 23  24 24 24 25   Glucose 82 - 115 mg/dL 181 High  162 High   158 High  159 High  125 High  107   Blood Urea Nitrogen 9.8 - 20.1 mg/dL 16.9 13.0  19.9 12.5 16.6 17.8   Creatinine 0.55 - 1.02 mg/dL 0.89 0.89 0.80 0.99 0.78 0.73 0.84   Calcium 8.4 - 10.2 mg/dL 10.8 High  10.6 High   10.2 10.6 High           Component Ref Range & Units 6 mo ago  (7/29/24) 1 yr ago  (1/25/24) 1 yr ago  (3/15/23) 2 yr ago  (9/15/22) 2 yr ago  (5/16/22) 3 yr ago  (1/27/22)   PARATHYROID HORMONE INTACT 8.7 - 77.0 pg/mL 74.8 66.1 87.5 High  121.3 High  111.0 High  8   -     Renal Function Panel; Future; Expected date:  01/27/2025  -     PTH, Intact; Future; Expected date: 01/27/2025  -     Vitamin D; Future; Expected date: 01/27/2025  -     Magnesium; Future; Expected date: 01/27/2025    3. Hypercalcemia  See above    4. Vitamin D deficiency  Vitamin-D level 37 on 07/29/2024  -     Vitamin D; Future; Expected date: 01/27/2025             Component Ref Range & Units 6 mo ago  (7/29/24) 7 mo ago  (6/26/24) 1 yr ago  (1/25/24) 1 yr ago  (3/15/23) 2 yr ago  (9/15/22) 2 yr ago  (5/16/22) 3 yr ago  (1/27/22)   Vitamin D 30 - 80 ng/mL 37 42 82.1 High  R 29.5 Low  R 27.0 Low  R 29.3 Low  R       -     Vitamin D; Future; Expected date: 01/27/2025    5. Low magnesium level  Magnesium level 1.50 on 01/25/2024   Repeat magnesium 1.9 on 07/29/2024  -     Magnesium; Future; Expected date: 01/27/2025  Component Ref Range & Units 6 mo ago 1 yr ago 2 yr ago   Magnesium Level 1.60 - 2.60 mg/dL 1.90 1.50 Low  1.60   -     Magnesium; Future; Expected date: 01/27/2025          Follow up in about 6 months (around 7/27/2025) for Hyperthyroidism, Hyperparathyroidism.

## 2025-01-27 NOTE — PROGRESS NOTES
I saw and evaluated the patient and was present for the key portions of the exam and separately billed procedures, if any. I have reveiwed and agree with the resident's findings, including all diagnostic interpretations and plans as written.    Ordering updated DEXA scan. Continue vit D and OTC calcium. Giving flu shot. Remainder per resident note

## 2025-01-27 NOTE — PROGRESS NOTES
[facility] INTERNAL MEDICINE  OUTPATIENT OFFICE VISIT NOTE    SUBJECTIVE:      HPI: Chary Min is a 64 y.o. yo female w/ PMH of hypertension, hyperthyroidism, hyperlipidemia, and vitamin D deficiency who presents today for routine follow up.     Today, patient with no complaints. No more hypoglycemia since stopping jardiance.   Saw endocrine today and labs up to date.     ROS:  (-) Chest pain, palpitations, SOB, fever, night sweats, chills, diarrhea, constipation.       OBJECTIVE:     Vital signs:   /64 (BP Location: Left arm, Patient Position: Sitting)   Pulse 72   Temp 98.4 °F (36.9 °C) (Oral)   Resp 18   Wt 69.2 kg (152 lb 9.6 oz)   SpO2 100%   BMI 25.39 kg/m²      Physical Examination:  General: Well nourished w/o distress  HEENT: NC/AT; nasal and oral mucosa moist and clear  Pulm: CTA bilaterally, normal work of breathing  CV: S1, S2 w/o murmurs or gallops  GI: Soft with normal bowel sounds in all quadrants, no masses on palpation  MSK: no lower extremity edema  Derm: No rashes, abnormal bruising, or skin lesions  Neuro: AAOx4  Psych: Cooperative; appropriate mood and affect    Protective Sensation (w/ 10 gram monofilament):  Right: Intact  Left: Intact    Visual Inspection:  Normal -  Bilateral    Pedal Pulses:   Right: Present  Left: Present    Posterior Tibialis Pulses:   Right:Present  Left: Present     ASSESSMENT & PLAN:     Hypertension  - /64oday  - Followed by Cardiology, last seen 7/28/2022: EF 60% with diastolic dysfunction, no changes made to meds  - Previously stopped combo pill since HCTZ might make hypercalcemia worse   - Continue Lisinopril 40 mg daily, Amlodipine 10 mg daily, Metoprolol succinate 25 mg.  - Disontinue jardiance in 7/2024 due to symptomatic hypoglycemia  - Advised to keep a BP log, low salt diet    Uncontrolled type 2 diabetes mellitus with hyperglycemia  -Most recent A1c 7.1; goal 7-7.5  -discontinued jardiance due to symptomatic hypoglycemia  - Meds:  metformin  "1000mg BID, Tradjenta 5 mg daily; Lantus 30u QHS  - Previously d/c glimepiride 2mg in 2018. was started on Januvia but she stopped taking on her own because FBG sometimes in 90s. Stopped Tradjenta in 10/2021 on her own because FBG in 80-90s. No episode or sx of hypoglycemia.  - Diabetic foot exam in clinic 1/2025  - Diabetic eye exam -follows optho  - Proteinuria screening- elevated UPC/microalbumin of 51.7 and protein/cr ratio ordered  - Asked to measure and keep a log of her blood sugar levels before breakfast and 2 hrs after largest meal  - Advised on following diabetic diet and daily exercise  - Advised patient on the importance of medication compliance.    Mixed hyperlipidemia  - , HDL 48, , LDL 54 in 1/2024  - Advised on low fat/cholesterol diet  - Continue Atorvastatin 40 mg daily  -rpt ordered     Nightly wheezing vs stridor  Nightly snoring  - Patient has small oropharynx  - Description sounds as if patient may have MARK  - Referred for sleep study but appt cancelled by patient in 9/2023 as she does not want to have anyone watch her sleep and states she probably would not wear a CPAP device due to discomfort  - She will think about this and discuss at next visit    Hot flashes  - Referred to GYN in 5/2023, referral pending review at this time     Macular edema of left eye  Combined forms of age-related cataract of right eye  - Followed by Ophthalmology with last seen 6/2023  - Will defer management to Ophthalmology  - Per Ophthalmology: "7/2/24: DFE appears stable. Small CWS superior nasal OS (seen in optos) does not appear to be new NV. Will continue to monitor at this time. Q4 month DFE/OCTm/photos"     Hypercalcemia  Hyperparathyroidism  - Found to have elevated calcium and parathyroid hormone (83 in 1/2022; most recent 87.5 in 3/2023).   - Ca 10.6, PTH 87.5, VitD 29.5 in 3/2023  - DEXA 2/2022  The average T score lumbar vertebrae is -1.3 which is within the range of osteopenia. The average T " "score of the femurs is -0.7 which is within normal range.  -rpt dxa ordered  - Advised to hydrate well. and call back if she gets sx.   - US parathyroid negative for adenoma 4/2023  - Followed by [facility]. Will defer management  - 3/2024: ENT NP: "Please instruct the patient that the CT parathyroid 4D shows possible too small parathyroid adenomas.  Currently her calcium level has decreased.  I will continue to monitor her calcium level and if it increases above 11 or she becomes symptomatic with numbness and tingling to her hands or feet then we will refer her to ENT."     Iron deficiency anemia secondary to inadequate dietary iron intake  - No signs of active bleeding   - FIT neg 5/2021   - Was ordered cologuard but cannot afford it. Will order FIT test.  - Continue iron sulfate 325 mg Monday, Wednesday, Friday     Vitamin D deficiency  - VitD 33 in 1/2025  - Previously completed vitD 50,000 units for 8 weeks  - Continue Vit D3 1000 units daily    Hyperthyroidism  - TSH 2.94, Free T4 1.14, Free T3 2.97 in 1/2025 - all wnl  - Thyroid uptake scan normal, thyrotropin receptor Ab level WNL, unknown etiology  - Denies any symptoms of thyrotoxicosis  - Compliant with meds  - Continue Methimazole 5 mg per Endo  - Followed by [facility]. Will defer management.    Abnormal mammogram  - MMG 5/8/2024: Right Breast: Incomplete - Need Additional Imaging Evaluation (BI-RADS 0); Left Breast: Benign (BI-RADS 2); Additional Right Breast imaging in 6/2024  - US MMG R Breast 6/19/2024: Right Breast: Benign (BI-RADS 2); MMG 7 mm oval mass; US 7x4x7 mm simple cyst/focal cystic ductal dilatation; MMG due 5/2025       Healthcare maintenance  ASCVD risk- 22.8 % . Cont Statin daily.   HM labs (CBC, CMP, FLP, A1c, TSH, vitD): UTD  HIV/Hep panel/syphilis: negative 5/2022  Pneumococcal vaccine: PCV20 8/2023  Tdap: refused   Zoster vaccine: refused  Flu: refused  FIT/colonoscopy: Cologuard Negative 1/26/2024  Lung cancer " screening (LDCT age 50-80): NA  AAA screening (men, age 65-75): NA  Mammogram (after 40): see above  DEXA scan: 2/2022 with osteopenia, rpt ordered  GYN (pap smears): had hysterectomy in past due to menorrhagia, referred to GYN as of 5/15/23       Return to clinic in 6 months.       Tiny Zaragoza MD  [facility] Internal Medicine, PRG-2  1/27/2025

## 2025-01-28 DIAGNOSIS — E05.90 HYPERTHYROIDISM: Primary | ICD-10-CM

## 2025-01-29 ENCOUNTER — OFFICE VISIT (OUTPATIENT)
Dept: CARDIOLOGY | Facility: CLINIC | Age: 65
End: 2025-01-29
Payer: MEDICARE

## 2025-01-29 VITALS
SYSTOLIC BLOOD PRESSURE: 150 MMHG | DIASTOLIC BLOOD PRESSURE: 86 MMHG | WEIGHT: 149.19 LBS | HEART RATE: 82 BPM | RESPIRATION RATE: 18 BRPM | BODY MASS INDEX: 24.86 KG/M2 | HEIGHT: 65 IN | OXYGEN SATURATION: 100 % | TEMPERATURE: 98 F

## 2025-01-29 DIAGNOSIS — Z79.4 TYPE 2 DIABETES MELLITUS WITH DIABETIC CATARACT, WITH LONG-TERM CURRENT USE OF INSULIN: ICD-10-CM

## 2025-01-29 DIAGNOSIS — I10 HYPERTENSION, UNSPECIFIED TYPE: ICD-10-CM

## 2025-01-29 DIAGNOSIS — E78.2 MIXED HYPERLIPIDEMIA: ICD-10-CM

## 2025-01-29 DIAGNOSIS — I10 PRIMARY HYPERTENSION: Primary | ICD-10-CM

## 2025-01-29 DIAGNOSIS — E11.36 TYPE 2 DIABETES MELLITUS WITH DIABETIC CATARACT, WITH LONG-TERM CURRENT USE OF INSULIN: ICD-10-CM

## 2025-01-29 PROBLEM — R01.1 SYSTOLIC MURMUR: Status: RESOLVED | Noted: 2022-05-16 | Resolved: 2025-01-29

## 2025-01-29 PROCEDURE — 3288F FALL RISK ASSESSMENT DOCD: CPT | Mod: CPTII,,, | Performed by: NURSE PRACTITIONER

## 2025-01-29 PROCEDURE — 99215 OFFICE O/P EST HI 40 MIN: CPT | Mod: PBBFAC | Performed by: NURSE PRACTITIONER

## 2025-01-29 PROCEDURE — 99214 OFFICE O/P EST MOD 30 MIN: CPT | Mod: S$PBB,,, | Performed by: NURSE PRACTITIONER

## 2025-01-29 PROCEDURE — 3051F HG A1C>EQUAL 7.0%<8.0%: CPT | Mod: CPTII,,, | Performed by: NURSE PRACTITIONER

## 2025-01-29 PROCEDURE — 3077F SYST BP >= 140 MM HG: CPT | Mod: CPTII,,, | Performed by: NURSE PRACTITIONER

## 2025-01-29 PROCEDURE — 3008F BODY MASS INDEX DOCD: CPT | Mod: CPTII,,, | Performed by: NURSE PRACTITIONER

## 2025-01-29 PROCEDURE — 1101F PT FALLS ASSESS-DOCD LE1/YR: CPT | Mod: CPTII,,, | Performed by: NURSE PRACTITIONER

## 2025-01-29 PROCEDURE — 4010F ACE/ARB THERAPY RXD/TAKEN: CPT | Mod: CPTII,,, | Performed by: NURSE PRACTITIONER

## 2025-01-29 PROCEDURE — 3079F DIAST BP 80-89 MM HG: CPT | Mod: CPTII,,, | Performed by: NURSE PRACTITIONER

## 2025-01-29 PROCEDURE — 1159F MED LIST DOCD IN RCRD: CPT | Mod: CPTII,,, | Performed by: NURSE PRACTITIONER

## 2025-01-29 PROCEDURE — 1160F RVW MEDS BY RX/DR IN RCRD: CPT | Mod: CPTII,,, | Performed by: NURSE PRACTITIONER

## 2025-01-29 RX ORDER — NIFEDIPINE 90 MG/1
90 TABLET, EXTENDED RELEASE ORAL DAILY
Qty: 30 TABLET | Refills: 11 | Status: SHIPPED | OUTPATIENT
Start: 2025-01-29 | End: 2026-01-29

## 2025-01-29 NOTE — PROGRESS NOTES
CHIEF COMPLAINT:   Chief Complaint   Patient presents with    Follow-up     Denies any cardiac problems                                                  HPI:  Chary Min 64 y.o. female  w/ PMH of hypertension, hyperlipidemia, DM II, hyperthyroidism, and vitamin D deficiency who presents to cardiology clinic for routine follow up and ongoing care.  Latest Echocardiogram obtained 4.27.22 revealed a preserved EF of 60%, diastolic dysfunction, and mild VA.  Most recent ischemic evaluation includes a Treadmill Nuclear Stress Test on 4.25.22 that revealed no reversible ischemia.    Patient presents to clinic today reporting that she feels good and denies any cardiovascular complaints.  The patient appears euvolemic on exam with no overt signs or symptoms of volume overload.  She is able to perform her routine ADLs and heavy housework without experiencing any angina or angina equivalent symptoms.  The patient admits that she does not participate in any formal exercise regimen but is able to ambulate without issue whenever she grocery shops.  However, the patient does plan to to start ambulating more often in efforts to increase her overall physical activity.                                                                                                                                                                                                                                                                                                    CARDIAC TESTING:  ECHO 4.27.22  Left ventricular ejection fraction is measured at approximately 60%  Structurally normal mitral valve   E to A flow reversal indicates diastolic dysfunction   Structurally normal trileaflet aortic valve   Tricuspid valve is structurally normal   Trace tricuspid regurgitation   Unable to estimate PASP   Pulmonic valve is structurally normal   Mild pulmonic regurgitation present  Normal-sized left atrium  Normal right atrial size   Normal right  ventricular chamber size and function  IVC normal       Treadmill Nuclear Stress 4.25.22  No reversible ischemia  No scar                                                                                                                                                   Patient Active Problem List   Diagnosis    Macular edema of left eye    Combined forms of age-related cataract of right eye    Hypertension    Type 2 diabetes mellitus with diabetic cataract, with long-term current use of insulin    Vitamin D deficiency    Hyperlipidemia    Hyperthyroidism    FLAVIA (iron deficiency anemia)    Hypercalcemia     Past Surgical History:   Procedure Laterality Date    BREAST SURGERY      EYE SURGERY      HYSTERECTOMY      TUBAL LIGATION       Social History     Socioeconomic History    Marital status:    Tobacco Use    Smoking status: Former     Current packs/day: 0.00     Types: Cigarettes     Quit date:      Years since quittin.0    Smokeless tobacco: Never   Substance and Sexual Activity    Alcohol use: Not Currently    Drug use: Never    Sexual activity: Yes     Partners: Male     Social Drivers of Health     Financial Resource Strain: Low Risk  (2025)    Overall Financial Resource Strain (CARDIA)     Difficulty of Paying Living Expenses: Not hard at all   Food Insecurity: No Food Insecurity (2025)    Hunger Vital Sign     Worried About Running Out of Food in the Last Year: Never true     Ran Out of Food in the Last Year: Never true   Transportation Needs: No Transportation Needs (2025)    TRANSPORTATION NEEDS     Transportation : No   Physical Activity: Insufficiently Active (2025)    Exercise Vital Sign     Days of Exercise per Week: 3 days     Minutes of Exercise per Session: 40 min   Stress: No Stress Concern Present (2025)    [employer] - Occupational Stress Questionnaire     Feeling of Stress : Not at all   Housing Stability: Low Risk  (2025)  "   Housing Stability Vital Sign     Unable to Pay for Housing in the Last Year: No     Homeless in the Last Year: No        Family History   Problem Relation Name Age of Onset    Hypertension Mother      Dementia Mother       Review of patient's allergies indicates:  No Known Allergies      ROS:  Review of Systems   Constitutional: Negative.    HENT: Negative.     Eyes: Negative.    Respiratory: Negative.  Negative for shortness of breath.    Cardiovascular: Negative.    Gastrointestinal: Negative.    Genitourinary: Negative.    Musculoskeletal: Negative.    Skin: Negative.    Neurological: Negative.    Endo/Heme/Allergies: Negative.    Psychiatric/Behavioral: Negative.                                                                                                                                                                                  Negative except as stated in the history of present illness. See HPI for details.    PHYSICAL EXAM:  Visit Vitals  BP (!) 150/86 (BP Location: Left arm, Patient Position: Sitting)   Pulse 82   Temp 98.4 °F (36.9 °C) (Oral)   Resp 18   Ht 5' 5" (1.651 m)   Wt 67.7 kg (149 lb 3.2 oz)   SpO2 100%   BMI 24.83 kg/m²         Physical Exam  HENT:      Head: Normocephalic.      Nose: Nose normal.   Eyes:      Pupils: Pupils are equal, round, and reactive to light.   Cardiovascular:      Rate and Rhythm: Normal rate and regular rhythm.   Pulmonary:      Effort: Pulmonary effort is normal.   Abdominal:      General: Abdomen is flat. Bowel sounds are normal.      Palpations: Abdomen is soft.   Musculoskeletal:         General: Normal range of motion.      Cervical back: Normal range of motion.   Skin:     General: Skin is warm.   Neurological:      General: No focal deficit present.      Mental Status: She is alert.   Psychiatric:         Mood and Affect: Mood normal.         Current Outpatient Medications   Medication Instructions    atorvastatin (LIPITOR) 40 mg, Oral, Daily    " "cholecalciferol (vitamin D3) (VITAMIN D3) 1,000 Units, Oral, Daily    FeroSuL 325 mg, Oral, Every other day    LANTUS SOLOSTAR U-100 INSULIN 30 Units, Subcutaneous, Nightly    lisinopriL (PRINIVIL,ZESTRIL) 40 mg, Oral, Daily    magnesium gluconate 27.5 mg magne- sium (500 mg) Tab 500 mg, Oral, Daily    metFORMIN (GLUCOPHAGE) 1,000 mg, Oral, 2 times daily    methIMAzole (TAPAZOLE) 5 MG Tab TAKE 1/2 TABLET BY MOUTH DAILY    metoprolol succinate (TOPROL-XL) 25 mg, Oral, Daily    NIFEdipine (PROCARDIA-XL) 60 mg, Oral, Daily    TECHLITE PEN NEEDLE 32 gauge x 5/32" Ndle USE ONE DAILY    TRADJENTA 5 mg, Oral, Daily    TRUE METRIX GLUCOSE METER Misc use as directed    TRUE METRIX GLUCOSE TEST STRIP Strp 1 each, skin prick, 2 times daily, Use to check blood glucose        All medications, laboratory studies, cardiac diagnostic imaging reviewed.     Lab Results   Component Value Date    LDL 54.00 01/25/2024    LDL 70.00 12/21/2022    TRIG 161 (H) 01/25/2024    TRIG 85 12/21/2022    CREATININE 0.75 01/27/2025    MG 1.60 01/27/2025    K 4.0 01/27/2025        ASSESSMENT/PLAN:  Primary hypertension  - BP above goal 150/90  - Continue Lisinopril 40 mg, Metoprolol Succinate 25 MG. Increase Nifedipine 90 mg for better BP control  - NV in 2-3 weeks for BP check   - Advised on low-sodium diet and increased exercise as tolerated    Mixed hyperlipidemia  - LDL 54- at goal   - Continue Atorvastatin 40 mg daily  - Counseled on low-cholesterol, heart healthy diet and exercise as tolerated     Type 2 Diabetes Mellitus   - A1C -7.1  - Management per PCP         Increase Nifedipine to 90 mg daily for better BP control  Nurse visit in 2-3 weeks for BP check   Follow up in cardiology clinic in 6 months or sooner if needed   Follow up with PCP as directed        "

## 2025-01-29 NOTE — PATIENT INSTRUCTIONS
Increase Nifedipine to 90 mg daily for better BP control  Nurse visit in 2-3 weeks for BP check   Follow up in cardiology clinic in 6 months or sooner if needed   Follow up with PCP as directed

## 2025-02-24 ENCOUNTER — CLINICAL SUPPORT (OUTPATIENT)
Dept: CARDIOLOGY | Facility: CLINIC | Age: 65
End: 2025-02-24
Payer: MEDICARE

## 2025-02-24 VITALS
DIASTOLIC BLOOD PRESSURE: 60 MMHG | HEIGHT: 65 IN | HEART RATE: 71 BPM | RESPIRATION RATE: 20 BRPM | TEMPERATURE: 98 F | BODY MASS INDEX: 25.13 KG/M2 | OXYGEN SATURATION: 100 % | WEIGHT: 150.81 LBS | SYSTOLIC BLOOD PRESSURE: 138 MMHG

## 2025-02-24 DIAGNOSIS — I10 PRIMARY HYPERTENSION: Primary | ICD-10-CM

## 2025-02-24 PROCEDURE — 99211 OFF/OP EST MAY X REQ PHY/QHP: CPT | Mod: S$PBB,,, | Performed by: NURSE PRACTITIONER

## 2025-02-24 PROCEDURE — 99215 OFFICE O/P EST HI 40 MIN: CPT | Mod: PBBFAC

## 2025-02-24 NOTE — PROGRESS NOTES
HERE FOR B/P CHECK 138/60 LEFT MANUAL HR---71   STATES HAS BEEN TAKING MED AND LOGGING  DENIES ANY OTHER CARDIAC ISSUES   INSTRUCTED TO CONTINUE SAME PROTOCOL  AND CALL IF ANY ELEVATIONS SEEN  VERBALIZED UNDERSTANDING   ED PRECAUTIONS GIVEN

## 2025-02-25 ENCOUNTER — TELEPHONE (OUTPATIENT)
Dept: ENDOCRINOLOGY | Facility: CLINIC | Age: 65
End: 2025-02-25
Payer: MEDICARE

## 2025-02-25 NOTE — TELEPHONE ENCOUNTER
----- Message from Payton Damian NP sent at 1/28/2025  7:37 PM CST -----  Please instruct the patient it is time to repeat her thyroid labs at this time

## 2025-02-27 ENCOUNTER — LAB VISIT (OUTPATIENT)
Dept: LAB | Facility: HOSPITAL | Age: 65
End: 2025-02-27
Attending: NURSE PRACTITIONER
Payer: MEDICARE

## 2025-02-27 DIAGNOSIS — E05.90 HYPERTHYROIDISM: ICD-10-CM

## 2025-02-27 LAB
T3FREE SERPL-MCNC: 3.16 PG/ML (ref 1.58–3.91)
T4 FREE SERPL-MCNC: 1.12 NG/DL (ref 0.7–1.48)
TSH SERPL-ACNC: 2.98 UIU/ML (ref 0.35–4.94)

## 2025-02-27 PROCEDURE — 36415 COLL VENOUS BLD VENIPUNCTURE: CPT

## 2025-02-27 PROCEDURE — 84481 FREE ASSAY (FT-3): CPT

## 2025-02-27 PROCEDURE — 84439 ASSAY OF FREE THYROXINE: CPT

## 2025-02-27 PROCEDURE — 84443 ASSAY THYROID STIM HORMONE: CPT

## 2025-03-03 ENCOUNTER — RESULTS FOLLOW-UP (OUTPATIENT)
Dept: ENDOCRINOLOGY | Facility: CLINIC | Age: 65
End: 2025-03-03

## 2025-04-07 ENCOUNTER — OFFICE VISIT (OUTPATIENT)
Dept: OPHTHALMOLOGY | Facility: CLINIC | Age: 65
End: 2025-04-07
Payer: MEDICARE

## 2025-04-07 VITALS — BODY MASS INDEX: 24.99 KG/M2 | WEIGHT: 150 LBS | HEIGHT: 65 IN

## 2025-04-07 DIAGNOSIS — E11.3413 SEVERE NONPROLIFERATIVE DIABETIC RETINOPATHY OF BOTH EYES WITH MACULAR EDEMA ASSOCIATED WITH TYPE 2 DIABETES MELLITUS: ICD-10-CM

## 2025-04-07 DIAGNOSIS — H40.053 OCULAR HYPERTENSION, BILATERAL: Primary | ICD-10-CM

## 2025-04-07 DIAGNOSIS — H25.811 COMBINED FORMS OF AGE-RELATED CATARACT OF RIGHT EYE: ICD-10-CM

## 2025-04-07 PROCEDURE — 92133 CPTRZD OPH DX IMG PST SGM ON: CPT | Mod: PBBFAC,PN

## 2025-04-07 PROCEDURE — 92133 CPTRZD OPH DX IMG PST SGM ON: CPT | Mod: PBBFAC,PN | Performed by: OPHTHALMOLOGY

## 2025-04-07 PROCEDURE — 99213 OFFICE O/P EST LOW 20 MIN: CPT | Mod: PBBFAC,PN

## 2025-04-07 RX ORDER — LATANOPROST 50 UG/ML
1 SOLUTION/ DROPS OPHTHALMIC DAILY
Qty: 2.5 ML | Refills: 11 | Status: SHIPPED | OUTPATIENT
Start: 2025-04-07 | End: 2026-04-07

## 2025-04-07 NOTE — PROGRESS NOTES
HPI     Severe NPDR OU     Additional comments: - 01/16/25 HGBA1C 7.1           Comments    Here for DFE OU/Possible Cat Eval OD.  - C/O aching behind OS with headache, unsure if eyepain is causing   headache or the headache is causing the eyepain. Comes and goes, relieved   with ASA when occurs. Has been happening over the last 2 weeks.  - Vision is doing well with current glasses.           Last edited by Alannah Sesay LPN on 4/7/2025  3:14 PM.            Assessment /Plan     For exam results, see Encounter Report.    Ocular hypertension, bilateral    Severe nonproliferative diabetic retinopathy of both eyes with macular edema associated with type 2 diabetes mellitus    Combined forms of age-related cataract of right eye      OCT RNFL 4/7/25  RE: 93 all green  LE: 90 all green    OCT Mac 4/7/25  RE: , NFC, no IRF/SRF  LE: , FC distorted by VMT, no IRF/SRF      Fundus Photo 4/7/25  RE: Media clear, Optic nerve pink and sharp, blood vessels WNL, no retinal holes, tears, or detachments. Severe NPDR, MAs, large DBH temporal macula with area inferior concerning for LISETTE. DBH, CWS in periphery.  LE: Media clear, Optic nerve pink and sharp, blood vessels WNL, no retinal holes, tears, or detachments. Severe NPDR, MAs, DBH, CWS      1. PSK OS  - s/p CEIOL OS 12/1/2020 w IG vs DME now resolved  - monitor     2. Severe NPDR OU  -IVFA 7/2021 with MAs, but no NV  Hemoglobin A1c   Date Value Ref Range Status   06/26/2024 6.8 <=7.0 % Final   05/15/2023 8.1 (H) <=7.0 % Final   01/03/2023 7.8 (H) <=7.0 % Final   - Last A1C 7.1% from 01/16/2025  - 02/28/24: fundus photos taken, reviewed images with Dr. Monroy. Old LISETTE noted. Multiple DBH, recommend repeat DFE and fundus photos in four months. Consider IVFA at next visit as some DBH are questionable in appearance.  Retinal detachment and return precautions discussed.   - 7/2/24: DFE appears stable. Small CWS superior nasal OS (seen in optos) does not appear to  be new NV. Will continue to monitor at this time. Q4 month DFE/OCTm/photos  - 11/7/2024: doing well, A1c improved. Continue BG/BP/Lipid control with PCP  - 4/7/25: still no obvious NV on exam. Bring back for IVFA at next appt.     3. NSC OD  -poor dilation  -MRx 6/26/23 BCVA 20/30, pt does not want surgery yet, CTM  - Will be interested at next follow up for Mrx and poss Cat Eval     4.  Physiologic cupping  5. Occular HTN   - OCTN 02/28/24 full OD, ?Full OS  - TMax 26//27  - CCT not thin. neg FH, + ethnicity   - 11/7/2024: OCT all green.   - OCT RNFL   11/2024: all green ou   4/7/25: 93//90 all green OU  - 4/7/25: IOP elevated again today, 24//27. Discussed with patient, will start Latanoprost QHS OU at this time  - RTC 2 months IOP check     6. Vitreomacular traction OS  - 20/25 BCVA  - discussed with patient  - will continue to monitor given on visual symptoms    RTC 2 months IOP check, DFE, IVFA transit OD

## 2025-06-10 ENCOUNTER — TELEPHONE (OUTPATIENT)
Dept: OPHTHALMOLOGY | Facility: CLINIC | Age: 65
End: 2025-06-10
Payer: MEDICARE

## 2025-06-11 ENCOUNTER — CLINICAL SUPPORT (OUTPATIENT)
Dept: OPHTHALMOLOGY | Facility: CLINIC | Age: 65
End: 2025-06-11
Payer: MEDICARE

## 2025-06-11 VITALS — BODY MASS INDEX: 24.98 KG/M2 | WEIGHT: 149.94 LBS | HEIGHT: 65 IN

## 2025-06-11 DIAGNOSIS — H43.822 VITREOMACULAR ADHESION OF LEFT EYE: ICD-10-CM

## 2025-06-11 DIAGNOSIS — H40.053 OCULAR HYPERTENSION, BILATERAL: ICD-10-CM

## 2025-06-11 DIAGNOSIS — E11.3553 STABLE PROLIFERATIVE DIABETIC RETINOPATHY OF BOTH EYES ASSOCIATED WITH TYPE 2 DIABETES MELLITUS: Primary | ICD-10-CM

## 2025-06-11 PROCEDURE — 92235 FLUORESCEIN ANGRPH MLTIFRAME: CPT | Mod: PBBFAC,PN

## 2025-06-11 PROCEDURE — 99213 OFFICE O/P EST LOW 20 MIN: CPT | Mod: PBBFAC,PN

## 2025-06-11 RX ORDER — FLUORESCEIN 500 MG/ML
5 INJECTION INTRAVENOUS
Status: COMPLETED | OUTPATIENT
Start: 2025-06-11 | End: 2025-06-11

## 2025-06-11 RX ADMIN — FLUORESCEIN 500 MG: 500 INJECTION INTRAVENOUS at 08:06

## 2025-06-11 NOTE — PROGRESS NOTES
HPI    RTC 2 months IOP check, DFE, IVFA transit OD  Last edited by Marie Wills RN on 6/11/2025  7:19 AM.        TESTING    OCT RNFL 4/7/25  RE: 93 all green  LE: 90 all green    OCT Mac 4/7/25  RE: , NFC, no IRF/SRF  LE: , FC distorted by VMT, no IRF/SRF    Fundus Photo 4/7/25  RE: Media clear, Optic nerve pink and sharp, blood vessels WNL, no retinal holes, tears, or detachments. Severe NPDR, MAs, large DBH temporal macula with area inferior concerning for LISETTE. DBH, CWS in periphery.  LE: Media clear, Optic nerve pink and sharp, blood vessels WNL, no retinal holes, tears, or detachments. Severe NPDR, MAs, DBH, CWS    IVFA  - 06/11/2025   OD: media clear, good choroidal flush and arterial phase, late leakage around vessels  OS: media clear, good choroidal flush and arterial phase, late macular leakage      ASSESSMENT / PLAN    1. PSK OS  - s/p CEIOL OS 12/1/2020 w IG vs DME now resolved  - monitor     2. Active PDR without DME, OU  - Last A1c (7.1):  01/16/2025  - 2/28/24: fundus photos taken, reviewed images with Dr. Monroy. Old LISETTE noted. Multiple DBH, recommend repeat DFE and fundus photos in four months. Consider IVFA at next visit as some DBH are questionable in appearance.  Retinal detachment and return precautions discussed.   - 7/2/24: DFE appears stable. Small CWS superior nasal OS (seen in optos) does not appear to be new NV. Will continue to monitor at this time. Q4 month DFE/OCTm/photos  - 11/7/2024: doing well, A1c improved. Continue BG/BP/Lipid control with PCP  - 4/7/25: still no obvious NV on exam. Bring back for IVFA at next appt.   - 6/11/25: Here for IVFA which shows leakage around fronds of NV OD, less prominent OS. RBA discussed and patient elects to come back for PRP next visit. Will plan for OD first and then OS.  - Encouraged good BS/BP/Cholesterol control, f/u with PCP regularly    3. NSC OD  -poor dilation  -MRx 6/26/23 BCVA 20/30, pt does not want surgery yet, CTM  -  Will be interested at next follow up for Mrx and poss Cat Eval     4.  Physiologic cupping  5. Occular HTN   - OCTN 02/28/24 full OD, ?Full OS  - TMax 26//27  - CCT not thin. neg FH, + ethnicity   - 11/7/2024: OCT all green.   - OCT RNFL   11/2024: all green ou   4/7/25: 93//90 all green OU  - 4/7/25: IOP elevated again today, 24//27. Discussed with patient, will start Latanoprost QHS OU at this time  - 6/11/25: IOP at goal on current regimen of latanoprost, will continue to monitor    6. Vitreomacular traction OS  - 20/25 BCVA  - discussed with patient  - will continue to monitor given on visual symptoms    RTC 2-3 weeks procedure day for PRP OD

## 2025-06-12 ENCOUNTER — TELEPHONE (OUTPATIENT)
Dept: OPHTHALMOLOGY | Facility: CLINIC | Age: 65
End: 2025-06-12
Payer: MEDICARE

## 2025-07-08 ENCOUNTER — TELEPHONE (OUTPATIENT)
Dept: OPHTHALMOLOGY | Facility: CLINIC | Age: 65
End: 2025-07-08
Payer: MEDICARE

## 2025-07-09 ENCOUNTER — CLINICAL SUPPORT (OUTPATIENT)
Dept: OPHTHALMOLOGY | Facility: CLINIC | Age: 65
End: 2025-07-09
Payer: MEDICARE

## 2025-07-09 VITALS — BODY MASS INDEX: 24.98 KG/M2 | HEIGHT: 65 IN | WEIGHT: 149.94 LBS

## 2025-07-09 DIAGNOSIS — E11.3553 STABLE PROLIFERATIVE DIABETIC RETINOPATHY OF BOTH EYES ASSOCIATED WITH TYPE 2 DIABETES MELLITUS: Primary | ICD-10-CM

## 2025-07-09 DIAGNOSIS — H43.822 VITREOMACULAR ADHESION OF LEFT EYE: ICD-10-CM

## 2025-07-09 DIAGNOSIS — H40.053 OCULAR HYPERTENSION, BILATERAL: ICD-10-CM

## 2025-07-09 DIAGNOSIS — H25.811 COMBINED FORMS OF AGE-RELATED CATARACT OF RIGHT EYE: ICD-10-CM

## 2025-07-09 PROCEDURE — 99213 OFFICE O/P EST LOW 20 MIN: CPT | Mod: PBBFAC,PN

## 2025-07-09 NOTE — PROGRESS NOTES
HPI    RTC 2-3 weeks procedure day for PRP OD  Last edited by Teto Maxwell MA on 7/9/2025  8:36 AM.            Assessment /Plan     For exam results, see Encounter Report.    There are no diagnoses linked to this encounter.           TESTING    OCT RNFL 4/7/25  RE: 93 all green  LE: 90 all green    OCT Mac 4/7/25  RE: , NFC, no IRF/SRF  LE: , FC distorted by VMT, no IRF/SRF    OCT Mac 07/09/2025  RE:  LE:     Fundus Photo 4/7/25  RE: Media clear, Optic nerve pink and sharp, blood vessels WNL, no retinal holes, tears, or detachments. Severe NPDR, MAs, large DBH temporal macula with area inferior concerning for LISETTE. DBH, CWS in periphery.  LE: Media clear, Optic nerve pink and sharp, blood vessels WNL, no retinal holes, tears, or detachments. Severe NPDR, MAs, DBH, CWS    IVFA  - 06/11/2025   OD: media clear, good choroidal flush and arterial phase, late leakage around vessels  OS: media clear, good choroidal flush and arterial phase, late macular leakage      ASSESSMENT / PLAN    1. PSK OS  - s/p CEIOL OS 12/1/2020 w IG vs DME now resolved  - monitor     2. Active PDR without DME, OU  - Last A1c (7.1):  01/16/2025  - 2/28/24: fundus photos taken, reviewed images with Dr. Monroy. Old LISETTE noted. Multiple DBH, recommend repeat DFE and fundus photos in four months. Consider IVFA at next visit as some DBH are questionable in appearance.  Retinal detachment and return precautions discussed.   - 7/2/24: DFE appears stable. Small CWS superior nasal OS (seen in optos) does not appear to be new NV. Will continue to monitor at this time. Q4 month DFE/OCTm/photos  - 11/7/2024: doing well, A1c improved. Continue BG/BP/Lipid control with PCP  - 4/7/25: still no obvious NV on exam. Bring back for IVFA at next appt.   - 6/11/25: Here for IVFA which shows leakage around fronds of NV OD, less prominent OS. RBA discussed and patient elects to come back for PRP next visit. Will plan for OD first and then OS.  -  07/09/25: Here for PRP OD reviewed 06/11/25 with Dr. Emanuel, neovascularization appears more predominant in left eye. Will do light PRP in left eye first, and plan for PRP OD at subsequent visit. Discussed plan with patient, consent obtaind. PRP OS done today.  - Encouraged good BS/BP/Cholesterol control, f/u with PCP regularly  - return in 4-6 weeks for repeat evaluation and possible PRP OD vs fill in OS    3. NSC OD  -poor dilation  -MRx 6/26/23 BCVA 20/30, pt does not want surgery yet, CTM  - Will address possible cataract surgery if patient interested in subsequent visits (not interested today, will address above first)     4.  Physiologic cupping  5. Occular HTN   - OCTN 02/28/24 full OD, ?Full OS  - TMax 26//27  - CCT not thin. neg FH, + ethnicity   - 11/7/2024: OCT all green.   - OCT RNFL   11/2024: all green ou   4/7/25: 93//90 all green OU  - 4/7/25: IOP elevated again today, 24//27. Discussed with patient, will start Latanoprost QHS OU at this time  - 6/11/25: IOP at goal on current regimen of latanoprost, will continue to monitor    6. Vitreomacular traction OS  - 20/25 BCVA  - discussed with patient  - will continue to monitor given on visual symptoms    Procedure note:  07/09/2025  PRP OS  Active PDR OS  Dr. Cortes  Attending: Dr. Emanuel    Informed written consent obtained. Risks/benefits/alternatives discussed. A drop of proparacaine was used for anesthesia. An indirect laser headset was used to apply 274 laser shots at 100 ms duration, 200 mw power, and 200 ms interval. Laser was applied predominantly inferonasally.

## 2025-07-10 ENCOUNTER — TELEPHONE (OUTPATIENT)
Dept: OPHTHALMOLOGY | Facility: CLINIC | Age: 65
End: 2025-07-10
Payer: MEDICARE

## 2025-07-11 ENCOUNTER — LAB VISIT (OUTPATIENT)
Dept: LAB | Facility: HOSPITAL | Age: 65
End: 2025-07-11
Payer: MEDICARE

## 2025-07-11 DIAGNOSIS — E78.5 HYPERLIPIDEMIA, UNSPECIFIED HYPERLIPIDEMIA TYPE: ICD-10-CM

## 2025-07-11 LAB
CHOLEST SERPL-MCNC: 126 MG/DL
CHOLEST/HDLC SERPL: 3 {RATIO} (ref 0–5)
HDLC SERPL-MCNC: 49 MG/DL (ref 35–60)
LDLC SERPL CALC-MCNC: 58 MG/DL (ref 50–140)
TRIGL SERPL-MCNC: 95 MG/DL (ref 37–140)
VLDLC SERPL CALC-MCNC: 19 MG/DL

## 2025-07-11 PROCEDURE — 80061 LIPID PANEL: CPT

## 2025-07-11 PROCEDURE — 36415 COLL VENOUS BLD VENIPUNCTURE: CPT

## 2025-07-16 ENCOUNTER — OFFICE VISIT (OUTPATIENT)
Dept: INTERNAL MEDICINE | Facility: CLINIC | Age: 65
End: 2025-07-16
Payer: MEDICARE

## 2025-07-16 VITALS
BODY MASS INDEX: 25.56 KG/M2 | SYSTOLIC BLOOD PRESSURE: 138 MMHG | DIASTOLIC BLOOD PRESSURE: 66 MMHG | RESPIRATION RATE: 18 BRPM | HEART RATE: 80 BPM | WEIGHT: 153.63 LBS | OXYGEN SATURATION: 100 % | TEMPERATURE: 98 F

## 2025-07-16 DIAGNOSIS — Z12.31 ENCOUNTER FOR SCREENING MAMMOGRAM FOR BREAST CANCER: ICD-10-CM

## 2025-07-16 DIAGNOSIS — Z79.4 TYPE 2 DIABETES MELLITUS WITH DIABETIC CATARACT, WITH LONG-TERM CURRENT USE OF INSULIN: Primary | ICD-10-CM

## 2025-07-16 DIAGNOSIS — E11.36 TYPE 2 DIABETES MELLITUS WITH DIABETIC CATARACT, WITH LONG-TERM CURRENT USE OF INSULIN: Primary | ICD-10-CM

## 2025-07-16 LAB — HBA1C MFR BLD: 7 %

## 2025-07-16 PROCEDURE — 83036 HEMOGLOBIN GLYCOSYLATED A1C: CPT | Mod: PBBFAC

## 2025-07-16 PROCEDURE — 99214 OFFICE O/P EST MOD 30 MIN: CPT | Mod: PBBFAC

## 2025-07-16 RX ORDER — INSULIN GLARGINE 100 [IU]/ML
15 INJECTION, SOLUTION SUBCUTANEOUS NIGHTLY
Qty: 4.5 ML | Refills: 11 | Status: SHIPPED | OUTPATIENT
Start: 2025-07-16 | End: 2026-07-16

## 2025-07-16 NOTE — PROGRESS NOTES
[facility] INTERNAL MEDICINE  OUTPATIENT OFFICE VISIT NOTE    SUBJECTIVE:      HPI: Chary Min is a 64 y.o. yo female w/ PMH of hypertension, hyperthyroidism, hyperlipidemia, and vitamin D deficiency who presents today for routine follow up.     Today, patient complaining of nonspecific weakness in bilateral legs that comes and goes. Tingling in her hands as well. Denies any bowel/bladder incontinence, back pain. States is having hypoglycemic episodes in morning with sugars in mid to low 60s.     ROS:  (-) Chest pain, palpitations, SOB, fever, night sweats, chills, diarrhea, constipation.       OBJECTIVE:     Vital signs:   /66 (BP Location: Left arm, Patient Position: Sitting)   Pulse 80   Temp 98.4 °F (36.9 °C) (Oral)   Resp 18   Wt 69.7 kg (153 lb 9.6 oz)   SpO2 100%   BMI 25.56 kg/m²      Physical Examination:  General: Well nourished w/o distress  HEENT: NC/AT; nasal and oral mucosa moist and clear  Pulm: CTA bilaterally, normal work of breathing  CV: S1, S2 w/o murmurs or gallops  GI: Soft with normal bowel sounds in all quadrants, no masses on palpation  MSK: no lower extremity edema  Derm: No rashes, abnormal bruising, or skin lesions  Neuro: AAOx4  Psych: Cooperative; appropriate mood and affect    Protective Sensation (w/ 10 gram monofilament):  Right: Intact  Left: Intact    Visual Inspection:  Normal -  Bilateral    Pedal Pulses:   Right: Present  Left: Present    Posterior Tibialis Pulses:   Right:Present  Left: Present     ASSESSMENT & PLAN:     Hypertension  - /66 today  - Followed by Cardiology, last seen 7/28/2022: EF 60% with diastolic dysfunction, no changes made to meds  - Previously stopped combo pill since HCTZ might make hypercalcemia worse   - Continue Lisinopril 40 mg daily, Amlodipine 10 mg daily, Metoprolol succinate 25 mg.  - Disontinue jardiance in 7/2024 due to symptomatic hypoglycemia  - Advised to keep a BP log, low salt diet    Controlled type 2 diabetes mellitus  "with hyperglycemia  -Most recent A1c 7 (7/25); goal 7-7.5  -discontinued jardiance due to symptomatic hypoglycemia  - Meds:  metformin 1000mg BID, Tradjenta 5 mg daily; Lantus 30u QHS  - Previously d/c glimepiride 2mg in 2018. was started on Januvia but she stopped taking on her own because FBG sometimes in 90s. Stopped Tradjenta in 10/2021 on her own because FBG in 80-90s. No episode or sx of hypoglycemia.  - Diabetic foot exam in clinic 1/2025  - Diabetic eye exam -follows optho  - Proteinuria screening- elevated UPC/microalbumin of 51.7 and protein/cr ratio ordered  - Advised on following diabetic diet and daily exercise  - Advised patient on the importance of medication compliance.  -decrease insulin from 30 to 15 units and follow up with endocrine on 7/28   -weakness and tinging likely from long standing diabetes. Offered gabapentin but does not want at this time. Strict ER precautions provided     Mixed hyperlipidemia  - , HDL 48, , LDL 54 in 1/2024  - Advised on low fat/cholesterol diet  - Continue Atorvastatin 40 mg daily  -rpt panel at goal 2025    Nightly wheezing vs stridor  Nightly snoring  - Patient has small oropharynx  - Description sounds as if patient may have MARK  - Referred for sleep study but appt cancelled by patient in 9/2023 as she does not want to have anyone watch her sleep and states she probably would not wear a CPAP device due to discomfort  - She will think about this and discuss at next visit    Hot flashes  - Referred to GYN in 5/2023, referral pending review at this time     Macular edema of left eye  Combined forms of age-related cataract of right eye  - Followed by Ophthalmology with last seen 6/2023  - Will defer management to Ophthalmology  - Per Ophthalmology: "7/2/24: DFE appears stable. Small CWS superior nasal OS (seen in optos) does not appear to be new NV. Will continue to monitor at this time. Q4 month DFE/OCTm/photos"     Hypercalcemia  Hyperparathyroidism  - " "Found to have elevated calcium and parathyroid hormone (83 in 1/2022; most recent 87.5 in 3/2023).   - Ca 10.6, PTH 87.5, VitD 29.5 in 3/2023  - DEXA 2/2022  The average T score lumbar vertebrae is -1.3 which is within the range of osteopenia. The average T score of the femurs is -0.7 which is within normal range.  -rpt dxa ordered  - Advised to hydrate well. and call back if she gets sx.   - US parathyroid negative for adenoma 4/2023  - Followed by [facility]. Will defer management  - 3/2024: ENT NP: "Please instruct the patient that the CT parathyroid 4D shows possible too small parathyroid adenomas.  Currently her calcium level has decreased.  I will continue to monitor her calcium level and if it increases above 11 or she becomes symptomatic with numbness and tingling to her hands or feet then we will refer her to ENT."     Iron deficiency anemia secondary to inadequate dietary iron intake  - No signs of active bleeding   - FIT neg 5/2021   - Was ordered cologuard but cannot afford it. Will order FIT test.  - Continue iron sulfate 325 mg Monday, Wednesday, Friday     Vitamin D deficiency  - VitD 33 in 1/2025  - Previously completed vitD 50,000 units for 8 weeks  - Continue Vit D3 1000 units daily    Hyperthyroidism  - TSH 2.94, Free T4 1.14, Free T3 2.97 in 1/2025 - all wnl  - Thyroid uptake scan normal, thyrotropin receptor Ab level WNL, unknown etiology  - Denies any symptoms of thyrotoxicosis  - Compliant with meds  - Continue Methimazole 5 mg per Endo  - Followed by [facility]. Will defer management.    Abnormal mammogram  - MMG 5/8/2024: Right Breast: Incomplete - Need Additional Imaging Evaluation (BI-RADS 0); Left Breast: Benign (BI-RADS 2); Additional Right Breast imaging in 6/2024  - US MMG R Breast 6/19/2024: Right Breast: Benign (BI-RADS 2); MMG 7 mm oval mass; US 7x4x7 mm simple cyst/focal cystic ductal dilatation; MMG due 5/2025 and ordered        Healthcare maintenance  ASCVD risk- " 22.8 % . Cont Statin daily.   HM labs (CBC, CMP, FLP, A1c, TSH, vitD): UTD  HIV/Hep panel/syphilis: negative 5/2022  Pneumococcal vaccine: PCV20 8/2023  Tdap: refused   Zoster vaccine: refused  Flu: refused  FIT/colonoscopy: Cologuard Negative 1/26/2024  Lung cancer screening (LDCT age 50-80): NA  AAA screening (men, age 65-75): NA  Mammogram (after 40): see above  DEXA scan: 2/2022 with osteopenia, rpt ordered  GYN (pap smears): had hysterectomy in past due to menorrhagia, referred to GYN as of 5/15/23       Return to clinic in 6 months.       Tiny Zaragoza MD  [facility], [facility]  7/16/2025

## 2025-07-28 ENCOUNTER — LAB VISIT (OUTPATIENT)
Dept: LAB | Facility: HOSPITAL | Age: 65
End: 2025-07-28
Attending: NURSE PRACTITIONER
Payer: MEDICARE

## 2025-07-28 ENCOUNTER — OFFICE VISIT (OUTPATIENT)
Dept: ENDOCRINOLOGY | Facility: CLINIC | Age: 65
End: 2025-07-28
Payer: MEDICARE

## 2025-07-28 VITALS
HEIGHT: 65 IN | HEART RATE: 60 BPM | RESPIRATION RATE: 18 BRPM | BODY MASS INDEX: 25.33 KG/M2 | TEMPERATURE: 97 F | DIASTOLIC BLOOD PRESSURE: 86 MMHG | SYSTOLIC BLOOD PRESSURE: 141 MMHG | WEIGHT: 152 LBS

## 2025-07-28 DIAGNOSIS — E21.3 HYPERPARATHYROIDISM: ICD-10-CM

## 2025-07-28 DIAGNOSIS — R79.0 LOW MAGNESIUM LEVEL: ICD-10-CM

## 2025-07-28 DIAGNOSIS — E83.52 HYPERCALCEMIA: ICD-10-CM

## 2025-07-28 DIAGNOSIS — E05.90 HYPERTHYROIDISM: Primary | ICD-10-CM

## 2025-07-28 DIAGNOSIS — E05.90 HYPERTHYROIDISM: ICD-10-CM

## 2025-07-28 DIAGNOSIS — E11.36 TYPE 2 DIABETES MELLITUS WITH DIABETIC CATARACT, WITH LONG-TERM CURRENT USE OF INSULIN: ICD-10-CM

## 2025-07-28 DIAGNOSIS — Z79.4 TYPE 2 DIABETES MELLITUS WITH DIABETIC CATARACT, WITH LONG-TERM CURRENT USE OF INSULIN: ICD-10-CM

## 2025-07-28 LAB
25(OH)D3+25(OH)D2 SERPL-MCNC: 34 NG/ML (ref 30–80)
ALBUMIN SERPL-MCNC: 4.2 G/DL (ref 3.4–4.8)
ALBUMIN/CREAT UR: 44 MG/GM CR (ref 0–30)
BUN SERPL-MCNC: 23.3 MG/DL (ref 9.8–20.1)
CALCIUM SERPL-MCNC: 10.5 MG/DL (ref 8.4–10.2)
CHLORIDE SERPL-SCNC: 103 MMOL/L (ref 98–107)
CO2 SERPL-SCNC: 23 MMOL/L (ref 23–31)
CREAT SERPL-MCNC: 0.95 MG/DL (ref 0.55–1.02)
CREAT UR-MCNC: 63.9 MG/DL (ref 45–106)
GFR SERPLBLD CREATININE-BSD FMLA CKD-EPI: >60 ML/MIN/1.73/M2
GLUCOSE SERPL-MCNC: 127 MG/DL (ref 82–115)
MAGNESIUM SERPL-MCNC: 2 MG/DL (ref 1.6–2.6)
MICROALBUMIN UR-MCNC: 28.1 UG/ML
PHOSPHATE SERPL-MCNC: 3 MG/DL (ref 2.3–4.7)
POTASSIUM SERPL-SCNC: 4.5 MMOL/L (ref 3.5–5.1)
PTH-INTACT SERPL-MCNC: 79.1 PG/ML (ref 8.7–77)
SODIUM SERPL-SCNC: 137 MMOL/L (ref 136–145)
T3FREE SERPL-MCNC: 2.52 PG/ML (ref 1.58–3.91)
T4 FREE SERPL-MCNC: 1.23 NG/DL (ref 0.7–1.48)
TSH SERPL-ACNC: 3.36 UIU/ML (ref 0.35–4.94)

## 2025-07-28 PROCEDURE — 1126F AMNT PAIN NOTED NONE PRSNT: CPT | Mod: CPTII,,, | Performed by: NURSE PRACTITIONER

## 2025-07-28 PROCEDURE — 99214 OFFICE O/P EST MOD 30 MIN: CPT | Mod: S$PBB,,, | Performed by: NURSE PRACTITIONER

## 2025-07-28 PROCEDURE — 80069 RENAL FUNCTION PANEL: CPT

## 2025-07-28 PROCEDURE — 3288F FALL RISK ASSESSMENT DOCD: CPT | Mod: CPTII,,, | Performed by: NURSE PRACTITIONER

## 2025-07-28 PROCEDURE — 83970 ASSAY OF PARATHORMONE: CPT

## 2025-07-28 PROCEDURE — 83735 ASSAY OF MAGNESIUM: CPT

## 2025-07-28 PROCEDURE — 36415 COLL VENOUS BLD VENIPUNCTURE: CPT

## 2025-07-28 PROCEDURE — 3008F BODY MASS INDEX DOCD: CPT | Mod: CPTII,,, | Performed by: NURSE PRACTITIONER

## 2025-07-28 PROCEDURE — 3051F HG A1C>EQUAL 7.0%<8.0%: CPT | Mod: CPTII,,, | Performed by: NURSE PRACTITIONER

## 2025-07-28 PROCEDURE — 82306 VITAMIN D 25 HYDROXY: CPT

## 2025-07-28 PROCEDURE — 84443 ASSAY THYROID STIM HORMONE: CPT

## 2025-07-28 PROCEDURE — 4010F ACE/ARB THERAPY RXD/TAKEN: CPT | Mod: CPTII,,, | Performed by: NURSE PRACTITIONER

## 2025-07-28 PROCEDURE — 99214 OFFICE O/P EST MOD 30 MIN: CPT | Mod: PBBFAC | Performed by: NURSE PRACTITIONER

## 2025-07-28 PROCEDURE — 1101F PT FALLS ASSESS-DOCD LE1/YR: CPT | Mod: CPTII,,, | Performed by: NURSE PRACTITIONER

## 2025-07-28 PROCEDURE — 84439 ASSAY OF FREE THYROXINE: CPT

## 2025-07-28 PROCEDURE — 1159F MED LIST DOCD IN RCRD: CPT | Mod: CPTII,,, | Performed by: NURSE PRACTITIONER

## 2025-07-28 PROCEDURE — 3079F DIAST BP 80-89 MM HG: CPT | Mod: CPTII,,, | Performed by: NURSE PRACTITIONER

## 2025-07-28 PROCEDURE — 1160F RVW MEDS BY RX/DR IN RCRD: CPT | Mod: CPTII,,, | Performed by: NURSE PRACTITIONER

## 2025-07-28 PROCEDURE — 82570 ASSAY OF URINE CREATININE: CPT

## 2025-07-28 PROCEDURE — 3077F SYST BP >= 140 MM HG: CPT | Mod: CPTII,,, | Performed by: NURSE PRACTITIONER

## 2025-07-28 PROCEDURE — 84481 FREE ASSAY (FT-3): CPT

## 2025-07-28 NOTE — PROGRESS NOTES
Subjective     Patient ID: Chary Min is a 65 y.o. female.    Chief Complaint: Hyperthyroidism    09/15/2022 endocrine clinic note:  62-year-old female scheduled today as new patient referral to endocrine clinic for hypercalcemia, hyperparathyroidism and vitamin-D deficiency.  Patient had previous workup in 2015 ultrasound March 11, 2015 of thyroid IMPRESSION: Heterogeneous appearing thyroid without discrete nodule identified.  Patient had NM parathyroid on 12/10/2015 IMPRESSION: Normal radioiodine thyroid uptake and scan.  Patient later had bone density 12/10/2015 IMPRESSION: 1. Bone density of the lumbar vertebrae consistent with osteopenia. 2. Normal bone mineral density of the femurs. Calcium level 10.5 on 05/16/2022.  On previous lab and 7 months ago vitamin-D was corrected to 39 with parathyroid at 83.8.  Previously patient had vitamin-D deficiency with vitamin-D level 9.6 on 06/12/2020 since she states she is been on different doses of vitamin-D and is now currently on over-the-counter vitamin-D 1000 IU daily.  Calcium level has increased over the last year and fluctuated.  Patient denies any fractures or kidney stones.  Denies taking any calcium supplementation or taking over-the-counter stomach medicine such as Tums.     Endocrine clinic note 03/15/2023:  62 year female scheduled today for endocrine clinic follow-up.  History of hyperparathyroidism, hypercalcemia with vitamin-D deficiency and hyper thyroidism. Calcium level 10.5 on 05/16/2022 decreased 7.3 on 09/15/2022.  Recent .3, previous PTH corrected to 83.8 with corrected vitamin-D of 39 on 01/27/2022.  Patient denies any kidney stones or fractures.  She denies any symptoms of hypercalcemia she denies any tingling or dystonia.  Hyperthyroidism patient is currently on  mg b.i.d. TSH 4.267, free T3 3.01, free T4 0.93 on 01/03/2023.  Patient is asking about being placed on methimazole for she can be on less tablets per day.  Instructed  patient repeat her labs today and adjust medication change PTU the methimazole patient denies any symptoms of hyperthyroidism she denies any weight gain or loss, fatigue, tremors, palpitations or anxiety.      Endocrine clinic note 01/25/2024:  63 year female scheduled today for endocrine clinic follow-up.  History of hyperparathyroidism, hypercalcemia, and vitamin-D deficiency with hyperthyroidism. Hyperparathyroidism Calcium level 10.6 on 05/15/2023, Recent , previous PTH corrected to 83.8 with corrected vitamin-D of 39. 24 hour calcium and creatinine normal 03/15/2023, US thyroid No adenoma seen 01/25/2023. NM Parathyroid ordered today.  Patient reports occasionally tingling to her hands she denies any constipation she denies kidney stones or fractures since her previous visit.  Hyperthyroidism patient was DC off PTU and started on methimazole low-dose currently on methimazole 5 mg TSH 3.361, Free T4 1.07, Free T3 3.10 on 01/17/2024.  We will reduce methimazole to half a tablet a day.      Endocrine clinic note 07/25/2024:  64-year-old female scheduled today for endocrine clinic follow-up. History of hyperparathyroidism, hypercalcemia, and vitamin-D deficiency with hyperthyroidism. Hyperparathyroidism/ hypercalcemia Calcium level 10.6 on 6/26/2024  Recent , previous PTH corrected to 83.8 w/ corrected vitamin-D of 39,  With elevated vitamin-D level PTH returned to 66.1 on 01/25/2024, 24 hour calcium and creatinine normal 03/15/2023. US thyroid No adenoma seen 01/25/2023, NM Parathyroid 01/29/2024 no parathyroid adenoma seen, CT neck 4D 02/26/2024: Linear nodules posterior to the left thyroid lobe and inferior to the right thyroid lobe are indeterminate, with small parathyroid adenomas possible.  Calcium level has remained below 11 patient is currently asymptomatic discussed with the patient we will continue to monitor.  Patient had low magnesium level of 1.50 on 01/25/2024 and was started on  over-the-counter magnesium.  We will repeat her level today.  Hyperthyroidism pt is on  MMI 2.5 mg daily, Reduce methimazole to 2.5 mg per day with TSH 3.361, Free T4 1.07, Free T3 3.10 on 01/17/2024.  Patient denies any weight loss, tremors, anxiety, diarrhea or heat intolerance.  will repeat labs today.      Endocrine clinic note 01/27/2025:  64 year female scheduled today for endocrine clinic follow-up. History of hyperparathyroidism, hypercalcemia, and vitamin-D deficiency with hyperthyroidism. Hyperparathyroidism/ hypercalcemia Calcium level 10.8 on 07/29/2024, PTH 74.8 on 07/29/2024. 24 hour calcium and creatinine normal 03/15/2023. US thyroid No adenoma seen 01/25/2023. NM Parathyroid 01/29/2024 no parathyroid adenoma seen. CT neck 4D 02/26/2024: Linear nodules posterior to the left thyroid lobe and inferior to the right thyroid lobe are indeterminate, with small parathyroid adenomas possible.  Patient denies any palpable neck.  She states previously she was having tingling to her hands which has decreased, she denies any excessive or increased constipation. Low magnesium level  Magnesium level 1.50 on 01/25/2024, Repeat magnesium 1.9 on 07/29/2024.  Vitamin D deficiency Vitamin-D level corrected to 37 on 07/29/2024. Hyperthyroidism Patient is On MMI 2.5 mg daily TSH 1.850, free T3 3.06, free T4 1.07 on 07/29/2024.  Patient denies any weight loss, any heat intolerance, palpitations or signs of hyperthyroidism.          Endocrine clinic note 07/28/2025: Chary Min is a 65 y.o. female scheduled today for endocrine clinic follow-up. History of hyperparathyroidism, hypercalcemia, and vitamin-D deficiency with hyperthyroidism. Hyperparathyroidism/ hypercalcemia Calcium level 10.7 on 01/27/2025 , PTH 62.9 on 01/27/2025. 24 hour calcium and creatinine normal 03/15/2023. US thyroid No adenoma seen 01/25/2023   NM Parathyroid 01/29/2024 no parathyroid adenoma seen. CT neck 4D 02/26/2024: Linear nodules posterior  to the left thyroid lobe and inferior to the right thyroid lobe are indeterminate, with small parathyroid adenomas possible.  Patient reports occasional tingling to her hands and reports that she has at times weakness in her legs that she discussed with her primary care provider.  She denies any excessive constipation any fractures or kidney stones since her previous visit. Vitamin D deficiency Vitamin-D level 33 on 01/27/2025.  Low magnesium patient previously had a low magnesium level corrected to 1.60 currently patient is on magnesium 500 mg per day. Hyperthyroidism pt is on MMI 2.5 mg daily . TSH 1.850, free T3 3.06, free T4 1.07 on 07/29/2024.  Patient states her weight is stable, she denies any palpitations or tremors or heat intolerance.    * Final Report *     Reason For Exam  Hyperthyroidism     Radiology Report  Ultrasound March 11, 2015     INDICATION: Hyperthyroidism     Technique: The right and left lobes of the thyroid are normal in size  measuring 4.1 x 1.5 x 1.7 cm and 4.0 x 1.2 x 1.3 cm. Echotexture is  somewhat heterogeneous. The isthmus is within normal limits at 3 mm.  No solid or cystic nodule identified. Vascularity is symmetric and  within normal limits. There is no cervical adenopathy.     IMPRESSION: Heterogeneous appearing thyroid without discrete nodule  identified.     Signature Line  Electronically Signed By: Clark Quintero MD  Date/Time Signed: 03/11/2015 09:30        This document has an image     Result type:     US Thyroid  Result date:     March 11, 2015 9:27 CDT  Result status:  Auth (Verified)  Result title:      US Thyroid  Performed by: Clark Quintero MD on March 11, 2015 9:30 CDT  Verified by:      Clark Quintero MD on March 11, 2015 9:30 CDT  Encounter info:            [id number], [facility], Outpatient, 3/11/2015 - 3/11/2015     Details     Reading Physician      Reading Date  Result Priority  IN REPORT, PROVIDER       12/10/2015           Narrative &  Impression  Thyroid radioiodine uptake and scan     INDICATION:Thyrotoxicosis, unspecified without thyrotoxic crisis or  storm     FINDINGS:      Routine 24 hour radioiodine thyroid uptake and scanning was performed  after oral and administration of 287 uCi of Iodine-123 sodium iodide.  Oblique views are included.     There is homogeneous tracer distribution throughout both thyroid  lobes. No photopenic or focal intense foci.     24 hour radioiodine thyroid uptake measures 23% (normal is 15 to 30%  ).     IMPRESSION: Normal radioiodine thyroid uptake and scan.      Electronically Signed By: Scot Vargas MD  Date/Time Signed: 12/10/2015 10:53        Narrative & Impression     CLINICAL: Osteoporosis     TECHNIQUE: Standard bone mineral density was performed of the lumbar  vertebrae and the femurs.     FINDINGS:  The average T score lumbar vertebrae is -1.3 which is  within the range of osteopenia.     The average T score of the femurs is -0.7 which is within normal  range.     IMPRESSION:     1. Bone density of the lumbar vertebrae consistent with osteopenia.  2. Normal bone mineral density of the femurs.  Electronically Signed By: Michael yBrd MD  Date/Time Signed: 02/10/2022 08:47      NM Parathyroid Scan with SPECT Routine  Order: [id number]  Status: Final result       Visible to patient: No (inaccessible in MyChart)       Next appt: Today at 11:15 AM in Cardiology ([facility])       Dx: Hyperparathyroidism    2 Result Notes       1 Follow-up Encounter  Details        Reading PhysicianReading DateResult Priority  Michael Byrd MD  [phone]/29/2024Routine     Narrative & Impression  EXAMINATION:  NM PARATHYROID SCAN WITH SPECT ROUTINE     CLINICAL HISTORY:  Hyperparathyroidism, unspecified     TECHNIQUE:  Dual phase parathyroid scintigraphy was performed following intravenous administration of 26.6 mCi Technetium-99m Sestamibi. Anterior projection early scan was performed at 15 minutes  and delayed scans were obtained at 2  and 4 hours.  SPECT transverse, sagittal coronal images were supplemented.     COMPARISON:  Ultrasound thyroid glands December 10, 2015.  No recent exams available.     FINDINGS:  Scintigraphy performed at 30 minutes shows symmetrical homogenous sestamibi activity within the thyroid glands. Delayed scans show slow symmetrical washout of sestamibi from the thyroid glands. There was no differential clearance with focal asymmetric delayed retention to suggest parathyroid adenoma. Detection of parathyroid hyperplasia is not accurate or sensitive with this scan.     Impression:     No suggestion of parathyroid adenoma with sestamibi scan.        Electronically signed by:Michael Byrd  Date:                                            01/29/2024  Time:                                           15:32           Exam Ended: 01/29/24 12:25 CSTLast Resulted: 01/29/24 15:32 CST     CT Neck Parathyroid (4D)  Order: [id number] - Reflex for Order [id number]  Status: Final result       Visible to patient: No (inaccessible in MyChart)       Next appt: Today at 11:15 AM in Cardiology ([facility])       Dx: Hyperparathyroidism    3 Result Notes  Details        Reading PhysicianReading DateResult Priority  Sahara Vo MD  [phone]/1/2024Routine     Narrative & Impression  EXAMINATION:  CT NECK PARATHYROID (4D)     CLINICAL HISTORY:  Hyperparathyroidism, unspecified Hyperparathyroidism/hypercalcemia;     TECHNIQUE:  Axial CT images of the neck were obtained before and after intravenous contrast. Reformatted images in the sagittal and coronal plane were included.     Automatic exposure control was utilized to limit radiation dose.     DLP: 1652 mGy-cm     COMPARISON:  Nuclear medicine exam dated 01/29/2024     FINDINGS:  There is linear enhancement posterior to the left thyroid lobe measuring 3 mm in size, which appears to washout on delayed imaging (series 4, image 129).   There is a linear density inferior to the right thyroid lobe measuring 6 mm, which also appears to washout on delayed imaging (series 8, image 34).     The airways are patent.  There is no cervical lymphadenopathy.  The parotid glands and submandibular glands unremarkable.  Subcentimeter thyroid nodules are noted.  The major vessels in the neck are patent, with acid changes at the carotid bulbs.  There is no acute abnormality in visualized portions of brain parenchyma.  There is no definite destructive bone lesion.  There are mild emphysematous changes in the lung apices.     Impression:     Linear nodules posterior to the left thyroid lobe and inferior to the right thyroid lobe are indeterminate, with small parathyroid adenomas possible.        Electronically signed by:Sahara Vo  Date:                                            03/01/2024  Time:                                           09:18           Exam Ended: 02/26/24 09:30 CSTLast Resulted: 03/01/24 09:18 CST                       Review of Systems   Constitutional:  Negative for activity change, appetite change and fatigue.   HENT:  Negative for dental problem, hearing loss, tinnitus, trouble swallowing and goiter.    Eyes:  Negative for photophobia, pain and visual disturbance.   Respiratory:  Negative for cough, chest tightness and wheezing.    Cardiovascular:  Negative for chest pain, palpitations and leg swelling.   Gastrointestinal:  Negative for abdominal pain, constipation, diarrhea, nausea and reflux.   Endocrine: Negative for cold intolerance, heat intolerance, polydipsia and polyphagia.   Genitourinary:  Negative for difficulty urinating, flank pain, hematuria, hot flashes, menstrual irregularity, menstrual problem, nocturia and urgency.   Musculoskeletal:  Negative for back pain, gait problem, joint swelling, leg pain and joint deformity.   Integumentary:  Negative for color change, pallor, rash and breast discharge.   Allergic/Immunologic:  Negative for environmental allergies, food allergies and immunocompromised state.   Neurological:  Positive for weakness. Negative for tremors, seizures, headaches, coordination difficulties and memory loss.   Psychiatric/Behavioral:  Negative for agitation, behavioral problems and sleep disturbance. The patient is not nervous/anxious.           Objective     Physical Exam  Constitutional:       General: She is not in acute distress.     Appearance: Normal appearance. She is not ill-appearing.   HENT:      Head: Normocephalic and atraumatic.      Right Ear: External ear normal.      Left Ear: External ear normal.      Nose: Nose normal. No congestion or rhinorrhea.      Mouth/Throat:      Mouth: Mucous membranes are moist.      Pharynx: Oropharynx is clear. No oropharyngeal exudate.   Eyes:      General:         Right eye: No discharge.         Left eye: No discharge.      Conjunctiva/sclera: Conjunctivae normal.      Pupils: Pupils are equal, round, and reactive to light.   Neck:      Thyroid: No thyroid mass, thyromegaly or thyroid tenderness.   Cardiovascular:      Rate and Rhythm: Normal rate and regular rhythm.      Pulses: Normal pulses.      Heart sounds: Normal heart sounds. No murmur heard.  Pulmonary:      Effort: Pulmonary effort is normal. No respiratory distress.      Breath sounds: Normal breath sounds.   Abdominal:      General: Abdomen is flat. Bowel sounds are normal. There is no distension.      Palpations: Abdomen is soft.      Tenderness: There is no abdominal tenderness.   Musculoskeletal:         General: No swelling or tenderness. Normal range of motion.      Cervical back: Normal range of motion and neck supple. No tenderness.      Right lower leg: No edema.      Left lower leg: No edema.   Feet:      Right foot:      Skin integrity: Skin integrity normal.      Left foot:      Skin integrity: Skin integrity normal.   Lymphadenopathy:      Cervical: No cervical adenopathy.   Skin:     General:  Skin is warm and dry.      Coloration: Skin is not jaundiced or pale.   Neurological:      General: No focal deficit present.      Mental Status: She is alert and oriented to person, place, and time. Mental status is at baseline.      Coordination: Coordination normal.      Gait: Gait normal.   Psychiatric:         Mood and Affect: Mood normal.         Behavior: Behavior normal.         Thought Content: Thought content normal.            Assessment and Plan     1. Hyperthyroidism  On MMI 2.5 mg daily    TSH 1.850, free T3 3.06, free T4 1.07 on 07/29/2024  Component  Ref Range & Units (hover) 5 mo ago  (2/27/25) 6 mo ago  (1/27/25) 12 mo ago  (7/29/24) 1 yr ago  (1/17/24) 2 yr ago  (4/12/23) 2 yr ago  (3/15/23) 2 yr ago  (1/3/23)   TSH 2.983 2.944 1.850 3.361 3.250 3.884 4.267     Component  Ref Range & Units (hover) 5 mo ago  (2/27/25) 6 mo ago  (1/27/25) 12 mo ago  (7/29/24) 1 yr ago  (1/17/24) 2 yr ago  (4/12/23) 2 yr ago  (3/15/23) 2 yr ago  (1/3/23)   T3 Free 3.16 2.97 3.06 3.10 2.68 R 2.79 R 3.01 R     Component  Ref Range & Units (hover) 5 mo ago  (2/27/25) 6 mo ago  (1/27/25) 12 mo ago  (7/29/24) 1 yr ago  (1/17/24) 2 yr ago  (4/12/23) 2 yr ago  (3/15/23) 2 yr ago  (12/21/22)   Thyroxine Free 1.12 1.14 1.07 1.07 0.89 0.81 0.93   -     T4, Free; Future  -     TSH; Future  -     T3, Free ([facility]); Future    2. Hyperparathyroidism  Calcium level 10.7 on 01/27/2025   PTH 62.9 on 01/27/2025   24 hour calcium and creatinine normal 03/15/2023  US thyroid No adenoma seen 01/25/2023   NM Parathyroid 01/29/2024 no parathyroid adenoma seen  CT neck 4D 02/26/2024: Linear nodules posterior to the left thyroid lobe and inferior to the right thyroid lobe are indeterminate, with small parathyroid adenomas possible            Component  Ref Range & Units (hover) 6 mo ago  (1/27/25) 12 mo ago  (7/29/24) 1 yr ago  (6/26/24) 1 yr ago  (2/26/24) 1 yr ago  (1/25/24) 2 yr ago  (5/15/23) 2 yr ago  (3/15/23)   Sodium 138 139 139  141  138 138   Potassium 4.0 4.6 4.3  3.7 4.3 4.5   Chloride 106 106 107  107 104 104   CO2 25 25 23  24 24 24   Glucose 79 Low  181 High  162 High   158 High  159 High  125 High    Blood Urea Nitrogen 13.6 16.9 13.0  19.9 12.5 16.6   Creatinine 0.75 0.89 0.89 0.80 0.99 0.78 0.73   Calcium 10.7 High  10.8 High  10.6 High   10.2 10.6 High  10.6 High         Component  Ref Range & Units (hover) 6 mo ago  (1/27/25) 12 mo ago  (7/29/24) 1 yr ago  (6/26/24) 1 yr ago  (1/25/24) 2 yr ago  (3/15/23) 2 yr ago  (9/15/22) 3 yr ago  (5/16/22)   Vitamin D 33 37 42 82.1 High  R 29.5 Low  R 27.0 Low  R 29.3 Low  R               Component  Ref Range & Units (hover) 6 mo ago  (1/27/25) 12 mo ago  (7/29/24) 1 yr ago  (1/25/24) 2 yr ago  (3/15/23) 2 yr ago  (9/15/22) 3 yr ago  (5/16/22) 3 yr ago  (1/27/22)   PARATHYROID HORMONE INTACT 62.9 74.8 66.1 87.5 High  121.3 High  111.0 High  83.8 R      -     Renal Function Panel; Future  -     PTH, Intact; Future  -     Vitamin D; Future  -     Magnesium; Future    3. Hypercalcemia  See above     4. Vitamin D deficiency  Vitamin-D level 33 on 01/27/2025  Component  Ref Range & Units (hover) 6 mo ago  (1/27/25) 12 mo ago  (7/29/24) 1 yr ago  (6/26/24) 1 yr ago  (1/25/24) 2 yr ago  (3/15/23) 2 yr ago  (9/15/22) 3 yr ago  (5/16/22)   Vitamin D 33 37 42 82.1 High  R 29.5 Low  R 27.0 Low  R 29.3 Low  R       5. Low magnesium level  On Magnesium 500 mg daily   -     Magnesium; Future         Component  Ref Range & Units (hover) 6 mo ago 12 mo ago 1 yr ago 2 yr ago   Magnesium Level 1.60 1.90 1.50 Low  1.60            Follow up in about 3 months (around 10/28/2025) for Hyperparathyroidism, Hyperthyroidism.

## 2025-08-06 ENCOUNTER — HOSPITAL ENCOUNTER (OUTPATIENT)
Dept: RADIOLOGY | Facility: HOSPITAL | Age: 65
Discharge: HOME | End: 2025-08-06
Payer: MEDICARE

## 2025-08-06 DIAGNOSIS — Z12.31 ENCOUNTER FOR SCREENING MAMMOGRAM FOR BREAST CANCER: ICD-10-CM

## 2025-08-06 DIAGNOSIS — M85.80 OSTEOPENIA AFTER MENOPAUSE: ICD-10-CM

## 2025-08-06 DIAGNOSIS — Z78.0 OSTEOPENIA AFTER MENOPAUSE: ICD-10-CM

## 2025-08-06 PROCEDURE — 77080 DXA BONE DENSITY AXIAL: CPT | Mod: TC

## 2025-08-06 PROCEDURE — 77067 SCR MAMMO BI INCL CAD: CPT | Mod: TC

## 2025-08-21 ENCOUNTER — OFFICE VISIT (OUTPATIENT)
Dept: OPHTHALMOLOGY | Facility: CLINIC | Age: 65
End: 2025-08-21
Payer: MEDICARE

## 2025-08-21 VITALS — BODY MASS INDEX: 25.3 KG/M2 | WEIGHT: 151.88 LBS | HEIGHT: 65 IN

## 2025-08-21 DIAGNOSIS — E11.3553 STABLE PROLIFERATIVE DIABETIC RETINOPATHY OF BOTH EYES ASSOCIATED WITH TYPE 2 DIABETES MELLITUS: Primary | ICD-10-CM

## 2025-08-21 PROCEDURE — 92134 CPTRZ OPH DX IMG PST SGM RTA: CPT | Mod: PBBFAC,PN

## 2025-08-21 PROCEDURE — 99213 OFFICE O/P EST LOW 20 MIN: CPT | Mod: PBBFAC,PN,25

## 2025-08-21 PROCEDURE — 92134 CPTRZ OPH DX IMG PST SGM RTA: CPT | Mod: PBBFAC,PN | Performed by: OPHTHALMOLOGY

## 2025-08-21 RX ORDER — LORAZEPAM 1 MG/1
1 TABLET ORAL ONCE
Qty: 1 TABLET | Refills: 0 | Status: SHIPPED | OUTPATIENT
Start: 2025-08-21 | End: 2025-08-21